# Patient Record
Sex: FEMALE | Race: WHITE | Employment: UNEMPLOYED | URBAN - METROPOLITAN AREA
[De-identification: names, ages, dates, MRNs, and addresses within clinical notes are randomized per-mention and may not be internally consistent; named-entity substitution may affect disease eponyms.]

---

## 2017-01-11 ENCOUNTER — TELEPHONE (OUTPATIENT)
Dept: FAMILY MEDICINE | Facility: CLINIC | Age: 67
End: 2017-01-11

## 2017-01-11 DIAGNOSIS — M62.830 BACK MUSCLE SPASM: Primary | ICD-10-CM

## 2017-01-11 DIAGNOSIS — G89.4 CHRONIC PAIN SYNDROME: ICD-10-CM

## 2017-01-11 RX ORDER — CYCLOBENZAPRINE HCL 10 MG
10 TABLET ORAL 3 TIMES DAILY PRN
Qty: 90 TABLET | Refills: 0 | Status: SHIPPED | OUTPATIENT
Start: 2017-01-11 | End: 2017-02-06

## 2017-01-11 NOTE — TELEPHONE ENCOUNTER
Prescription refilled once, schedule follow up office visit to discuss how medication is used and further pain management.  Tere Camacho MD

## 2017-01-11 NOTE — TELEPHONE ENCOUNTER
cyclobenzaprine (FLEXERIL) 10 MG tablet 60 tablet 3 10/27/2016  No      Sig: Take 1 tablet (10 mg) by mouth 3 times daily as needed for muscle spasms     Class: E-Prescribe     Notes to Pharmacy: 3 month supply     Route: Oral     Order: [id number]     E-Prescribing Status: Receipt confirmed by pharmacy (10/27/2016 12:56 PM CDT)       This is a PRN medication. Notes to pharmacy show that the Rx should last patient 3 months at a time. Provider will need to clarify. I will also send to covering provider for input.     Nena Kent RN

## 2017-01-11 NOTE — TELEPHONE ENCOUNTER
Reason for Call:  Medication or medication refill:    Do you use a [facility]?  Name of the pharmacy and phone number for the current request: [facility] [id number] [place] [address]    Name of the medication requested: Flexeril 10 mg needs TID ( wants 90 in each bottle) all out sending HIGH PRIORITY for urgent refill    Other request: Call when approved    Can we leave a detailed message on this number? YES    Phone number patient can be reached at: Home number on file [phone] (home)    Best Time: Any    Call taken on 1/11/2017 at 12:01 PM by Zarina Erwin

## 2017-01-11 NOTE — TELEPHONE ENCOUNTER
Called pharmacy, they stated pt does have refills on this med for 60 tabs not 90 tabs, pt last picked up the medication on 12/12/16, Because the sig for this medication states to take 3 times a day, pt is suppose to get 90 tabs not 60 tabs. Pls advise on quantity and send a new script if needed.  Kit Toussaint,  [facility]

## 2017-01-11 NOTE — TELEPHONE ENCOUNTER
Please contact pharmacy and see when last Rx for Flexiril picked up and when patient can  next refill (patient was given a year supply).     Then flag back to RN as Doug is out of office today.   Thanks team.   Nena Kent RN

## 2017-02-06 ENCOUNTER — OFFICE VISIT (OUTPATIENT)
Dept: FAMILY MEDICINE | Facility: CLINIC | Age: 67
End: 2017-02-06
Payer: COMMERCIAL

## 2017-02-06 VITALS
WEIGHT: 189 LBS | HEIGHT: 65 IN | HEART RATE: 99 BPM | TEMPERATURE: 97.6 F | DIASTOLIC BLOOD PRESSURE: 88 MMHG | SYSTOLIC BLOOD PRESSURE: 132 MMHG | BODY MASS INDEX: 31.49 KG/M2 | OXYGEN SATURATION: 95 %

## 2017-02-06 DIAGNOSIS — J45.30 MILD PERSISTENT ASTHMA WITHOUT COMPLICATION: ICD-10-CM

## 2017-02-06 DIAGNOSIS — E53.8 VITAMIN B12 DEFICIENCY DISEASE: ICD-10-CM

## 2017-02-06 DIAGNOSIS — Z86.39 HISTORY OF DIABETES MELLITUS, TYPE II: ICD-10-CM

## 2017-02-06 DIAGNOSIS — N18.30 CHRONIC KIDNEY DISEASE, STAGE 3 (MODERATE): ICD-10-CM

## 2017-02-06 DIAGNOSIS — M62.830 BACK MUSCLE SPASM: ICD-10-CM

## 2017-02-06 DIAGNOSIS — G89.4 CHRONIC PAIN SYNDROME: ICD-10-CM

## 2017-02-06 DIAGNOSIS — E78.5 HYPERLIPIDEMIA LDL GOAL <130: ICD-10-CM

## 2017-02-06 DIAGNOSIS — D47.1 MYELOPROLIFERATIVE DISORDER (H): ICD-10-CM

## 2017-02-06 DIAGNOSIS — G62.9 NEUROPATHY: ICD-10-CM

## 2017-02-06 DIAGNOSIS — F41.1 GENERALIZED ANXIETY DISORDER: ICD-10-CM

## 2017-02-06 DIAGNOSIS — D75.839 THROMBOCYTOSIS: ICD-10-CM

## 2017-02-06 DIAGNOSIS — Z01.818 PREOP GENERAL PHYSICAL EXAM: Primary | ICD-10-CM

## 2017-02-06 DIAGNOSIS — G47.00 PERSISTENT INSOMNIA: ICD-10-CM

## 2017-02-06 DIAGNOSIS — Z12.11 SCREEN FOR COLON CANCER: ICD-10-CM

## 2017-02-06 DIAGNOSIS — I10 HYPERTENSION, GOAL BELOW 140/90: ICD-10-CM

## 2017-02-06 DIAGNOSIS — E03.4 HYPOTHYROIDISM DUE TO ACQUIRED ATROPHY OF THYROID: ICD-10-CM

## 2017-02-06 LAB
ALBUMIN SERPL-MCNC: 3.9 G/DL (ref 3.4–5)
ALP SERPL-CCNC: 89 U/L (ref 40–150)
ALT SERPL W P-5'-P-CCNC: 33 U/L (ref 0–50)
ANION GAP SERPL CALCULATED.3IONS-SCNC: 11 MMOL/L (ref 3–14)
AST SERPL W P-5'-P-CCNC: 28 U/L (ref 0–45)
BILIRUB SERPL-MCNC: 0.3 MG/DL (ref 0.2–1.3)
BUN SERPL-MCNC: 12 MG/DL (ref 7–30)
CALCIUM SERPL-MCNC: 9.6 MG/DL (ref 8.5–10.1)
CHLORIDE SERPL-SCNC: 105 MMOL/L (ref 94–109)
CHOLEST SERPL-MCNC: 213 MG/DL
CO2 SERPL-SCNC: 25 MMOL/L (ref 20–32)
CREAT SERPL-MCNC: 0.99 MG/DL (ref 0.52–1.04)
ERYTHROCYTE [DISTWIDTH] IN BLOOD BY AUTOMATED COUNT: 13.4 % (ref 10–15)
GFR SERPL CREATININE-BSD FRML MDRD: 56 ML/MIN/1.7M2
GLUCOSE SERPL-MCNC: 113 MG/DL (ref 70–99)
HBA1C MFR BLD: 5.3 % (ref 4.3–6)
HCT VFR BLD AUTO: 46.8 % (ref 35–47)
HDLC SERPL-MCNC: 75 MG/DL
HGB BLD-MCNC: 14.7 G/DL (ref 11.7–15.7)
LDLC SERPL DIRECT ASSAY-MCNC: 116 MG/DL
MCH RBC QN AUTO: 29.2 PG (ref 26.5–33)
MCHC RBC AUTO-ENTMCNC: 31.4 G/DL (ref 31.5–36.5)
MCV RBC AUTO: 93 FL (ref 78–100)
PLATELET # BLD AUTO: 334 10E9/L (ref 150–450)
POTASSIUM SERPL-SCNC: 4.3 MMOL/L (ref 3.4–5.3)
PROT SERPL-MCNC: 7.8 G/DL (ref 6.8–8.8)
RBC # BLD AUTO: 5.04 10E12/L (ref 3.8–5.2)
SODIUM SERPL-SCNC: 141 MMOL/L (ref 133–144)
TSH SERPL DL<=0.005 MIU/L-ACNC: 3.85 MU/L (ref 0.4–4)
WBC # BLD AUTO: 9.9 10E9/L (ref 4–11)

## 2017-02-06 PROCEDURE — 84443 ASSAY THYROID STIM HORMONE: CPT | Performed by: FAMILY MEDICINE

## 2017-02-06 PROCEDURE — 83721 ASSAY OF BLOOD LIPOPROTEIN: CPT | Mod: 59 | Performed by: FAMILY MEDICINE

## 2017-02-06 PROCEDURE — 36415 COLL VENOUS BLD VENIPUNCTURE: CPT | Performed by: FAMILY MEDICINE

## 2017-02-06 PROCEDURE — 82465 ASSAY BLD/SERUM CHOLESTEROL: CPT | Performed by: FAMILY MEDICINE

## 2017-02-06 PROCEDURE — 93000 ELECTROCARDIOGRAM COMPLETE: CPT | Performed by: FAMILY MEDICINE

## 2017-02-06 PROCEDURE — 83718 ASSAY OF LIPOPROTEIN: CPT | Performed by: FAMILY MEDICINE

## 2017-02-06 PROCEDURE — 80053 COMPREHEN METABOLIC PANEL: CPT | Performed by: FAMILY MEDICINE

## 2017-02-06 PROCEDURE — 99214 OFFICE O/P EST MOD 30 MIN: CPT | Performed by: FAMILY MEDICINE

## 2017-02-06 PROCEDURE — 83036 HEMOGLOBIN GLYCOSYLATED A1C: CPT | Performed by: FAMILY MEDICINE

## 2017-02-06 PROCEDURE — 85027 COMPLETE CBC AUTOMATED: CPT | Performed by: FAMILY MEDICINE

## 2017-02-06 RX ORDER — TRAMADOL HYDROCHLORIDE 50 MG/1
50 TABLET ORAL EVERY 8 HOURS PRN
Qty: 90 TABLET | Refills: 2 | Status: SHIPPED | OUTPATIENT
Start: 2017-02-06 | End: 2017-05-23

## 2017-02-06 RX ORDER — AMITRIPTYLINE HYDROCHLORIDE 100 MG/1
100 TABLET ORAL AT BEDTIME
Qty: 90 TABLET | Refills: 1 | Status: SHIPPED | OUTPATIENT
Start: 2017-02-06 | End: 2017-07-11

## 2017-02-06 RX ORDER — AMITRIPTYLINE HYDROCHLORIDE 50 MG/1
50 TABLET ORAL AT BEDTIME
Qty: 90 TABLET | Refills: 1 | Status: SHIPPED | OUTPATIENT
Start: 2017-02-06 | End: 2017-07-11

## 2017-02-06 RX ORDER — GABAPENTIN 300 MG/1
300 CAPSULE ORAL
Qty: 180 CAPSULE | Refills: 3 | Status: SHIPPED | OUTPATIENT
Start: 2017-02-06 | End: 2018-04-05

## 2017-02-06 RX ORDER — AMITRIPTYLINE HYDROCHLORIDE 100 MG/1
100 TABLET ORAL AT BEDTIME
Qty: 90 TABLET | Refills: 1 | Status: SHIPPED | OUTPATIENT
Start: 2017-02-06 | End: 2017-02-06

## 2017-02-06 RX ORDER — AMITRIPTYLINE HYDROCHLORIDE 50 MG/1
50 TABLET ORAL AT BEDTIME
Qty: 90 TABLET | Refills: 1 | Status: SHIPPED | OUTPATIENT
Start: 2017-02-06 | End: 2017-02-06

## 2017-02-06 RX ORDER — CYCLOBENZAPRINE HCL 10 MG
10 TABLET ORAL 3 TIMES DAILY PRN
Qty: 90 TABLET | Refills: 1 | Status: SHIPPED | OUTPATIENT
Start: 2017-02-06 | End: 2018-04-05

## 2017-02-06 RX ORDER — TRAMADOL HYDROCHLORIDE 50 MG/1
50 TABLET ORAL EVERY 8 HOURS PRN
Qty: 90 TABLET | Refills: 2 | Status: SHIPPED | OUTPATIENT
Start: 2017-02-06 | End: 2017-02-06

## 2017-02-06 ASSESSMENT — ANXIETY QUESTIONNAIRES
6. BECOMING EASILY ANNOYED OR IRRITABLE: MORE THAN HALF THE DAYS
IF YOU CHECKED OFF ANY PROBLEMS ON THIS QUESTIONNAIRE, HOW DIFFICULT HAVE THESE PROBLEMS MADE IT FOR YOU TO DO YOUR WORK, TAKE CARE OF THINGS AT HOME, OR GET ALONG WITH OTHER PEOPLE: EXTREMELY DIFFICULT
2. NOT BEING ABLE TO STOP OR CONTROL WORRYING: NOT AT ALL
1. FEELING NERVOUS, ANXIOUS, OR ON EDGE: NEARLY EVERY DAY
7. FEELING AFRAID AS IF SOMETHING AWFUL MIGHT HAPPEN: NOT AT ALL
3. WORRYING TOO MUCH ABOUT DIFFERENT THINGS: NEARLY EVERY DAY
5. BEING SO RESTLESS THAT IT IS HARD TO SIT STILL: NEARLY EVERY DAY
GAD7 TOTAL SCORE: 14

## 2017-02-06 ASSESSMENT — PAIN SCALES - GENERAL: PAINLEVEL: EXTREME PAIN (9)

## 2017-02-06 ASSESSMENT — PATIENT HEALTH QUESTIONNAIRE - PHQ9: 5. POOR APPETITE OR OVEREATING: NEARLY EVERY DAY

## 2017-02-06 NOTE — Clinical Note
[facility]  [address] [place] [address]  [phone]      February 7, 2017      Evelyn Vargas  [address] [place], [place] [address] [facility] [address]              Dear Evelyn,      Your test results are attached. I am happy to let you know that they are stable and your medications can stay the same.    The blood sugar is normal and you do not have diabetes. The thyroid test is normal. The kidney test is decreased and we should recheck this in 3 months. The cholesterol is good. We can recheck labs in 3 months.     Please call me if you have any questions about these test results or about your care.      Sincerely,      Tere Camacho MD    Results for orders placed or performed in visit on 02/06/17   CBC with platelets   Result Value Ref Range    WBC 9.9 4.0 - 11.0 10e9/L    RBC Count 5.04 3.8 - 5.2 10e12/L    Hemoglobin 14.7 11.7 - 15.7 g/dL    Hematocrit 46.8 35.0 - 47.0 %    MCV 93 78 - 100 fl    MCH 29.2 26.5 - 33.0 pg    MCHC 31.4 (L) 31.5 - 36.5 g/dL    RDW 13.4 10.0 - 15.0 %    Platelet Count 334 150 - 450 10e9/L   Comprehensive metabolic panel   Result Value Ref Range    Sodium 141 133 - 144 mmol/L    Potassium 4.3 3.4 - 5.3 mmol/L    Chloride 105 94 - 109 mmol/L    Carbon Dioxide 25 20 - 32 mmol/L    Anion Gap 11 3 - 14 mmol/L    Glucose 113 (H) 70 - 99 mg/dL    Urea Nitrogen 12 7 - 30 mg/dL    Creatinine 0.99 0.52 - 1.04 mg/dL    GFR Estimate 56 (L) >60 mL/min/1.7m2    GFR Estimate If Black 68 >60 mL/min/1.7m2    Calcium 9.6 8.5 - 10.1 mg/dL    Bilirubin Total 0.3 0.2 - 1.3 mg/dL    Albumin 3.9 3.4 - 5.0 g/dL    Protein Total 7.8 6.8 - 8.8 g/dL    Alkaline Phosphatase 89 40 - 150 U/L    ALT 33 0 - 50 U/L    AST 28 0 - 45 U/L   TSH with free T4 reflex   Result Value Ref Range    TSH 3.85 0.40 - 4.00 mU/L   LDL cholesterol direct   Result Value Ref Range    LDL Cholesterol Direct 116 (H) <100 mg/dL   HDL cholesterol   Result Value Ref Range    HDL Cholesterol 75 >49 mg/dL   Hemoglobin A1c    Result Value Ref Range    Hemoglobin A1C 5.3 4.3 - 6.0 %   Cholesterol   Result Value Ref Range    Cholesterol 213 (H) <200 mg/dL

## 2017-02-06 NOTE — MR AVS SNAPSHOT
After Visit Summary   2/6/2017    Evelyn Vargas    MRN: [id number]           Patient Information     Date Of Birth          1950        Visit Information        Provider Department      2/6/2017 9:40 AM Tere Camacho MD [facility]        Today's Diagnoses     Preop general physical exam    -  1     Hypertension, goal below 140/90         Hypothyroidism due to acquired atrophy of thyroid         Chronic kidney disease, stage 3 (moderate)         Hyperlipidemia LDL goal <130         Myeloproliferative disorder (H)         Mild persistent asthma without complication         Vitamin B12 deficiency disease         Thrombocytosis (H)         Generalized anxiety disorder         History of diabetes mellitus, type II         Screen for colon cancer         Chronic pain syndrome         Persistent insomnia         Neuropathy (H)         Back muscle spasm           Care Instructions      Before Your Surgery      Call your surgeon if there is any change in your health. This includes signs of a cold or flu (such as a sore throat, runny nose, cough, rash or fever).    Do not smoke, drink alcohol or take over the counter medicine (unless your surgeon or primary care doctor tells you to) for the 24 hours before and after surgery.    If you take prescribed drugs: Follow your doctor s orders about which medicines to take and which to stop until after surgery.    Eating and drinking prior to surgery: follow the instructions from your surgeon    Take a shower or bath the night before surgery. Use the soap your surgeon gave you to gently clean your skin. If you do not have soap from your surgeon, use your regular soap. Do not shave or scrub the surgery site.  Wear clean pajamas and have clean sheets on your bed.     How to contact your care team: [phone] Pharmacy [phone]   ELIOT BOWERS MD KATYA GEORGIEV, PA-C CHRIS JONES, PA-C NAM HO, MD JONATHAN  "MD MELODIE OTT MD    Clinic hours M-Th 7am-7pm Fri 7am-5pm.   Urgent care M-F 11am-9pm  Sat/Sun 9am-5pm.   Pharmacy   Mon-:  8:00am-8pm   Fri:  8:00am-6:00pm  Sat/Sun  8:00am-5:00 pm             Follow-ups after your visit        Future tests that were ordered for you today     Open Future Orders        Priority Expected Expires Ordered    Fecal colorectal cancer screen FIT - Future (S+30) Routine 2017 3/8/2017 2017            Who to contact     If you have questions or need follow up information about today's clinic visit or your schedule please contact [facility] directly at [phone].  Normal or non-critical lab and imaging results will be communicated to you by MyChart, letter or phone within 4 business days after the clinic has received the results. If you do not hear from us within 7 days, please contact the clinic through [employer] or phone. If you have a critical or abnormal lab result, we will notify you by phone as soon as possible.  Submit refill requests through [employer] or call your pharmacy and they will forward the refill request to us. Please allow 3 business days for your refill to be completed.          Additional Information About Your Visit        [employer] Information     [employer] lets you send messages to your doctor, view your test results, renew your prescriptions, schedule appointments and more. To sign up, go to [email or web address] . Click on \"Log in\" on the left side of the screen, which will take you to the Welcome page. Then click on \"Sign up Now\" on the right side of the page.     You will be asked to enter the access code listed below, as well as some personal information. Please follow the directions to create your username and password.     Your access code is: [email or web address]  Expires: 2017 10:47 AM     Your access code will  in 90 days. If you need help or a new code, please call your [facility] or [phone].        Care " "EveryWhere ID     This is your Care EveryWhere ID. This could be used by other organizations to access your [facility] medical records  [id number]        Your Vitals Were     Pulse Temperature Height BMI (Body Mass Index) Pulse Oximetry Breastfeeding?    99 97.6  F (36.4  C) (Oral) 5' 5\" (1.651 m) 31.45 kg/m2 95% No       Blood Pressure from Last 3 Encounters:   02/06/17 132/88   08/18/16 164/92   01/18/16 135/90    Weight from Last 3 Encounters:   02/06/17 189 lb (85.73 kg)   08/18/16 177 lb 6.4 oz (80.468 kg)   01/18/16 155 lb 3.2 oz (70.398 kg)              We Performed the Following     CBC with platelets     Cholesterol     Comprehensive metabolic panel     EKG 12-lead complete w/read - Clinics     HDL cholesterol     Hemoglobin A1c     LDL cholesterol direct     TSH with free T4 reflex          Today's Medication Changes          These changes are accurate as of: 2/6/17 10:47 AM.  If you have any questions, ask your nurse or doctor.               Start taking these medicines.        Dose/Directions    * amitriptyline 100 MG tablet   Commonly known as:  ELAVIL   Used for:  Persistent insomnia   Started by:  Tere Camacho MD        Dose:  100 mg   Take 1 tablet (100 mg) by mouth At Bedtime   Quantity:  90 tablet   Refills:  1       * amitriptyline 50 MG tablet   Commonly known as:  ELAVIL   Used for:  Chronic pain syndrome   Started by:  Tere Camacho MD        Dose:  50 mg   Take 1 tablet (50 mg) by mouth At Bedtime Add to 100 mg as needed   Quantity:  90 tablet   Refills:  1       traMADol 50 MG tablet   Commonly known as:  ULTRAM   Used for:  Chronic pain syndrome   Started by:  Tere Camacho MD        Dose:  50 mg   Take 1 tablet (50 mg) by mouth every 8 hours as needed for moderate pain   Quantity:  90 tablet   Refills:  2       * Notice:  This list has 2 medication(s) that are the same as other medications prescribed for you. Read the directions carefully, and ask your doctor or other " care provider to review them with you.      These medicines have changed or have updated prescriptions.        Dose/Directions    gabapentin 300 MG capsule   Commonly known as:  NEURONTIN   This may have changed:    - how much to take  - when to take this   Used for:  Neuropathy (H)   Changed by:  Tere Camacho MD        Dose:  300 mg   Take 1 capsule (300 mg) by mouth 2 times daily Arthritis, non-child proof caps   Quantity:  180 capsule   Refills:  3            Where to get your medicines      These medications were sent to [place] [id number] [facility] [address] [place] [address]  [address] [place] [address]     Phone:  [phone]    - amitriptyline 100 MG tablet  - amitriptyline 50 MG tablet  - cyclobenzaprine 10 MG tablet  - gabapentin 300 MG capsule      Some of these will need a paper prescription and others can be bought over the counter.  Ask your nurse if you have questions.     Bring a paper prescription for each of these medications    - traMADol 50 MG tablet             Primary Care Provider Office Phone # Fax #    Tere Camacho [phone]       [facility] [address]  [facility] [address]        Thank you!     Thank you for choosing [facility]  for your care. Our goal is always to provide you with excellent care. Hearing back from our patients is one way we can continue to improve our services. Please take a few minutes to complete the written survey that you may receive in the mail after your visit with us. Thank you!             Your Updated Medication List - Protect others around you: Learn how to safely use, store and throw away your medicines at www.disposemymeds.org.          This list is accurate as of: 2/6/17 10:47 AM.  Always use your most recent med list.                   Brand Name Dispense Instructions for use    * albuterol 108 (90 BASE) MCG/ACT Inhaler    PROAIR HFA/PROVENTIL  HFA/VENTOLIN HFA    1 Inhaler    Inhale 2 puffs into the lungs every 6 hours as needed for shortness of breath / dyspnea       * albuterol (2.5 MG/3ML) 0.083% neb solution     360 mL    Take 1 vial (2.5 mg) by nebulization every 6 hours as needed       * amitriptyline 100 MG tablet    ELAVIL    90 tablet    Take 1 tablet (100 mg) by mouth At Bedtime       * amitriptyline 50 MG tablet    ELAVIL    90 tablet    Take 1 tablet (50 mg) by mouth At Bedtime Add to 100 mg as needed       cloNIDine 0.2 MG tablet    CATAPRES    90 tablet    Take 1 tablet (0.2 mg) by mouth 3 times daily       cyanocobalamin 1000 MCG/ML injection    VITAMIN B12     Inject 1,000 mcg into the muscle       cyclobenzaprine 10 MG tablet    FLEXERIL    90 tablet    Take 1 tablet (10 mg) by mouth 3 times daily as needed for muscle spasms       ferrous sulfate 325 (65 FE) MG tablet    IRON    30 tablet    Take 1 tablet (325 mg) by mouth 2 times daily       gabapentin 300 MG capsule    NEURONTIN    180 capsule    Take 1 capsule (300 mg) by mouth 2 times daily Arthritis, non-child proof caps       hydrOXYzine 25 MG tablet    ATARAX    30 tablet    Take 2 tablets (50 mg) by mouth nightly as needed for other (sleep)       losartan 50 MG tablet    COZAAR    90 tablet    Take 1 tablet (50 mg) by mouth daily       traMADol 50 MG tablet    ULTRAM    90 tablet    Take 1 tablet (50 mg) by mouth every 8 hours as needed for moderate pain       vitamin D 2000 UNITS tablet     100 tablet    Take 2,000 Units by mouth daily Start in 2 months, after completing 8 weeks of weekly high dose vitamin D.       vitamin D 02979 UNIT capsule    ERGOCALCIFEROL    8 capsule    Take 1 capsule (50,000 Units) by mouth every 7 days       * Notice:  This list has 4 medication(s) that are the same as other medications prescribed for you. Read the directions carefully, and ask your doctor or other care provider to review them with you.

## 2017-02-06 NOTE — PROGRESS NOTES
[address] [address]  [phone]  Dept: [phone]    PRE-OP EVALUATION:  Today's date: 2017    Evelyn Vargas (: 1950) presents for pre-operative evaluation assessment as requested by Dr. Jim Lawson.  She requires evaluation and anesthesia risk assessment prior to undergoing surgery/procedure for treatment of Cataracts .  Proposed procedure: Kelman Phacoemulcifiation with Insertion of Posterior Chamber Lens of Left eye    Date of Surgery/ Procedure: 17  Time of Surgery/ Procedure: Unknown  Hospital/Surgical Facility: [facility]  Fax number for surgical facility: [phone]  Primary Physician: Tere Camacho  Type of Anesthesia Anticipated: Unknown    Patient has a Health Care Directive or Living Will:  NO    1. NO - Do you have a history of heart attack, stroke, stent, bypass or surgery on an artery in the head, neck, heart or legs?  2. YES - Do you ever have any pain or discomfort in your chest?  3. NO - Do you have a history of  Heart Failure?  4. YES - Are you troubled by shortness of breath when: walking on the level, up a slight hill or at night?  5. NO - Do you currently have a cold, bronchitis or other respiratory infection?  6. YES - Do you have a cough, shortness of breath or wheezing?  7. YES - Do you sometimes get pains in the calves of your legs when you walk?  8. NO - Do you or anyone in your family have previous history of blood clots?  9. NO - Do you or does anyone in your family have a serious bleeding problem such as prolonged bleeding following surgeries or cuts?  10. YES - Have you ever had problems with anemia or been told to take iron pills?  11. NO - Have you had any abnormal blood loss such as black, tarry or bloody stools, or abnormal vaginal bleeding?  12. NO - Have you ever had a blood transfusion?  13. NO - Have you or any of your relatives ever had problems with  anesthesia?  14. NO - Do you have sleep apnea, excessive snoring or daytime drowsiness?  15. NO - Do you have any prosthetic heart valves?  16. NO - Do you have prosthetic joints?  17. NO - Is there any chance that you may be pregnant?      HPI:                                                      Brief HPI related to upcoming procedure: Decreased vision both eyes with poor reading and night vision. Cataract left eye.       HYPERTENSION - Patient has longstanding history of mod-severe HTN , currently denies any symptoms referable to elevated blood pressure. Specifically denies chest pain, palpitations, dyspnea, orthopnea, PND or peripheral edema. Blood pressure readings have been in normal range. Current medication regimen is as listed below. Patient denies any side effects of medication.                                                                                                                                                                                          .  HYPERLIPIDEMIA - Patient has a long history of significant Hyperlipidemia requiring medication for treatment with recent good control. Patient reports no problems or side effects with the medication.                                                                                                                                                       .  ASTHMA - Patient has a longstanding history of moderate-severe Asthma . Patient has been doing well overall noting DYSPNEA and continues on medication regimen consisting of inhalers without adverse reactions or side effects.                                                                                                                                               .    MEDICAL HISTORY:                                                      Patient Active Problem List    Diagnosis Date Noted     Chronic fatigue fibromyalgia syndrome 08/18/2016     Priority: Medium     Persistent insomnia 01/07/2016      Priority: Medium     Opiate or related narcotic overdose, accidental or unintentional, subsequent encounter 09/22/2015     Priority: Medium     Altered mental status 01/11/2015     Priority: Medium     Benzodiazepine dependence (H) 01/11/2015     Priority: Medium     Vision loss of right eye 01/11/2015     Priority: Medium     Chronic kidney disease 01/11/2015     Priority: Medium     Fibromyalgia 01/11/2015     Priority: Medium     Opioid dependence (H) 01/11/2015     Priority: Medium     Postoperative state 01/11/2015     Priority: Medium     Generalized anxiety disorder 09/21/2014     Priority: Medium     Diagnosis updated by automated process. Provider to review and confirm.       Hypertriglyceridemia 09/21/2014     Priority: Medium     Intermittent mild elevation with low HDL and normal LDL, no treatment indicated.       Myeloproliferative disorder (H) 08/22/2014     Priority: Medium     Polycythemia 08/01/2014     Priority: Medium     Thrombocytosis (H) 08/01/2014     Priority: Medium     Weight loss, unintentional 07/29/2014     Priority: Medium     Advanced directives, counseling/discussion 07/02/2014     Priority: Medium     Discussed advance care planning with patient; information given to patient to review. July 2, 2014  Rachael Tucker MA           Mild persistent asthma 08/16/2013     Priority: Medium     Vitamin B12 deficiency disease 08/16/2013     Priority: Medium     Hypothyroidism 08/16/2013     Priority: Medium     Obesity 08/16/2013     Priority: Medium     Hypertension, goal below 140/90 08/16/2013     Priority: Medium     Chronic pain syndrome 04/26/2013     Priority: Medium     Myalgia and myositis 04/26/2013     Priority: Medium      History reviewed. No pertinent past medical history.  History reviewed. No pertinent past surgical history.  Current Outpatient Prescriptions   Medication Sig Dispense Refill     cyclobenzaprine (FLEXERIL) 10 MG tablet Take 1 tablet (10 mg) by mouth 3 times daily as  needed for muscle spasms 90 tablet 0     traMADol (ULTRAM) 50 MG tablet TAKE 1-2 TABLETS BYMOUTH EVERY 6 HOURS AS NEEDED FOR MODERATE PAIN 60 tablet 2     amitriptyline (ELAVIL) 100 MG tablet Take 1 tablet (100 mg) by mouth At Bedtime 90 tablet 1     ferrous sulfate (IRON) 325 (65 FE) MG tablet Take 1 tablet (325 mg) by mouth 2 times daily 30 tablet 3     vitamin D (ERGOCALCIFEROL) 88356 UNIT capsule Take 1 capsule (50,000 Units) by mouth every 7 days 8 capsule 3     gabapentin (NEURONTIN) 300 MG capsule Take 2 capsules (600 mg) by mouth 3 times daily Arthritis, non-child proof caps 180 capsule 11     losartan (COZAAR) 50 MG tablet Take 1 tablet (50 mg) by mouth daily 90 tablet 3     hydrOXYzine (ATARAX) 25 MG tablet Take 2 tablets (50 mg) by mouth nightly as needed for other (sleep) 30 tablet 6     albuterol (PROAIR HFA, PROVENTIL HFA, VENTOLIN HFA) 108 (90 BASE) MCG/ACT inhaler Inhale 2 puffs into the lungs every 6 hours as needed for shortness of breath / dyspnea 1 Inhaler 11     albuterol (2.5 MG/3ML) 0.083% nebulizer solution Take 1 vial (2.5 mg) by nebulization every 6 hours as needed 360 mL 11     cloNIDine (CATAPRES) 0.2 MG tablet Take 1 tablet (0.2 mg) by mouth 3 times daily 90 tablet 10     cyanocobalamin (VITAMIN B12) 1000 MCG/ML injection Inject 1,000 mcg into the muscle       [DISCONTINUED] gabapentin (NEURONTIN) 300 MG capsule TAKE TWO CAPSULES BY MOUTH THREE TIMES DAILY 180 capsule 3     Cholecalciferol (VITAMIN D) 2000 UNITS tablet Take 2,000 Units by mouth daily Start in 2 months, after completing 8 weeks of weekly high dose vitamin D. 100 tablet 3     OTC products: None, except as noted above    Allergies   Allergen Reactions     Acetaminophen      Other reaction(s): Vomiting     Tylenol       Latex Allergy: NO    Social History   Substance Use Topics     Smoking status: Never Smoker      Smokeless tobacco: Never Used     Alcohol Use: No     History   Drug Use No       REVIEW OF SYSTEMS:             "                                        Constitutional, neuro, ENT, endocrine, pulmonary, cardiac, gastrointestinal, genitourinary, musculoskeletal, integument and psychiatric systems are negative, except as otherwise noted.    EXAM:                                                    /90 mmHg  Pulse 99  Temp(Src) 97.6  F (36.4  C) (Oral)  Ht 5' 5\" (1.651 m)  Wt 189 lb (85.73 kg)  BMI 31.45 kg/m2  SpO2 95%  Breastfeeding? No    GENERAL APPEARANCE: healthy, alert and no distress     EYES: EOMI,- PERRL     HENT: ear canals and TM's normal and nose and mouth without ulcers or lesions     NECK: no adenopathy, no asymmetry, masses, or scars and thyroid normal to palpation     RESP: lungs clear to auscultation - no rales, rhonchi or wheezes     BREAST: normal without masses, tenderness or nipple discharge and no palpable axillary masses or adenopathy     CV: regular rates and rhythm, normal S1 S2, no S3 or S4 and no murmur, click or rub -     ABDOMEN:  soft, nontender, no HSM or masses and bowel sounds normal     : normal cervix, adnexae, and uterus without masses or discharge and rectal exam normal without masses-guaiac negative stool     MS: extremities normal- no gross deformities noted, no evidence of inflammation in joints, FROM in all extremities.     SKIN: no suspicious lesions or rashes     NEURO: Normal strength and tone, sensory exam grossly normal, mentation intact and speech normal     PSYCH: mentation appears normal. and affect normal/bright     LYMPHATICS: No axillary, cervical, inguinal, or supraclavicular nodes    DIAGNOSTICS:                                                    EKG: appears normal, NSR, normal axis, normal intervals, no acute ST/T changes c/w ischemia, no LVH by voltage criteria, nonspecific ST-T changes, unchanged from previous tracings    Recent Labs   Lab Test  08/18/16   1222  01/07/16   1131   HGB  14.2  14.4   PLT  291  306   NA  144  139   POTASSIUM  3.9  3.8   CR  0.70 "  0.73   A1C  5.6  5.3        IMPRESSION:                                                    Reason for surgery/procedure: left cataract/ Proposed procedure: Kelman Phacoemulcifiation with Insertion of Posterior Chamber Lens of Left eye  Diagnosis/reason for consult: cardiac and anesthesia risk assessment     The proposed surgical procedure is considered LOW risk.    REVISED CARDIAC RISK INDEX  The patient has the following serious cardiovascular risks for perioperative complications such as (MI, PE, VFib and 3  AV Block):  No serious cardiac risks  INTERPRETATION: 0 risks: Class I (very low risk - 0.4% complication rate)    The patient has the following additional risks for perioperative complications:  No identified additional risks      ICD-10-CM    1. Preop general physical exam Z01.818 EKG 12-lead complete w/read - Clinics- Approved for surgery and anesthesia.    2. Hypertension, goal below 140/90 I10    3. Hypothyroidism due to acquired atrophy of thyroid E03.4 TSH with free T4 reflex   4. Chronic kidney disease, stage 3 (moderate) N18.3 CBC with platelets     Comprehensive metabolic panel   5. Hyperlipidemia LDL goal <130 E78.5 LDL cholesterol direct     HDL cholesterol     Cholesterol   6. Myeloproliferative disorder (H) D47.1    7. Mild persistent asthma without complication J45.30    8. Vitamin B12 deficiency disease E53.8    9. Thrombocytosis (H) D47.3    10. Generalized anxiety disorder F41.1    11. History of diabetes mellitus, type II Z86.39 Hemoglobin A1c   12. Screen for colon cancer Z12.11 Fecal colorectal cancer screen FIT - Future (S+30)   13. Chronic pain syndrome G89.4 traMADol (ULTRAM) 50 MG tablet     amitriptyline (ELAVIL) 50 MG tablet             14. Persistent insomnia G47.00 amitriptyline (ELAVIL) 100 MG tablet        15. Neuropathy (H) G62.9 gabapentin (NEURONTIN) 300 MG capsule   16. Back muscle spasm M62.830 cyclobenzaprine (FLEXERIL) 10 MG tablet       RECOMMENDATIONS:                                                           --Patient is to take all scheduled medications on the day of surgery EXCEPT for modifications listed below.    APPROVAL GIVEN to proceed with proposed procedure, without further diagnostic evaluation       Signed Electronically by: Tere Camacho MD    Copy of this evaluation report is provided to requesting physician.    [facility] Preop Guidelines

## 2017-02-06 NOTE — PATIENT INSTRUCTIONS
Before Your Surgery      Call your surgeon if there is any change in your health. This includes signs of a cold or flu (such as a sore throat, runny nose, cough, rash or fever).    Do not smoke, drink alcohol or take over the counter medicine (unless your surgeon or primary care doctor tells you to) for the 24 hours before and after surgery.    If you take prescribed drugs: Follow your doctor s orders about which medicines to take and which to stop until after surgery.    Eating and drinking prior to surgery: follow the instructions from your surgeon    Take a shower or bath the night before surgery. Use the soap your surgeon gave you to gently clean your skin. If you do not have soap from your surgeon, use your regular soap. Do not shave or scrub the surgery site.  Wear clean pajamas and have clean sheets on your bed.     How to contact your care team: [phone] Pharmacy [phone]   ELIOT BOWERS MD KATYA GEORGIEV, PA-C CHRIS JONES, PA-C NAM HO, MD JONATHAN BATES, MD ARVIN VOCAL, MD    Clinic hours M-Th 7am-7pm Fri 7am-5pm.   Urgent care M-F 11am-9pm  Sat/Sun 9am-5pm.   Pharmacy   Mon-Th:  8:00am-8pm   Fri:  8:00am-6:00pm  Sat/Sun  8:00am-5:00 pm

## 2017-02-07 ENCOUNTER — TELEPHONE (OUTPATIENT)
Dept: FAMILY MEDICINE | Facility: CLINIC | Age: 67
End: 2017-02-07

## 2017-02-07 DIAGNOSIS — D50.8 OTHER IRON DEFICIENCY ANEMIA: ICD-10-CM

## 2017-02-07 DIAGNOSIS — E53.8 VITAMIN B12 DEFICIENCY (NON ANEMIC): ICD-10-CM

## 2017-02-07 DIAGNOSIS — E55.9 VITAMIN D DEFICIENCY: ICD-10-CM

## 2017-02-07 DIAGNOSIS — I10 ESSENTIAL HYPERTENSION WITH GOAL BLOOD PRESSURE LESS THAN 140/90: ICD-10-CM

## 2017-02-07 DIAGNOSIS — G47.00 PERSISTENT INSOMNIA: Primary | ICD-10-CM

## 2017-02-07 RX ORDER — HYDROXYZINE HYDROCHLORIDE 25 MG/1
50 TABLET, FILM COATED ORAL
Qty: 30 TABLET | Refills: 6 | Status: SHIPPED | OUTPATIENT
Start: 2017-02-07 | End: 2018-04-05

## 2017-02-07 RX ORDER — LOSARTAN POTASSIUM 50 MG/1
50 TABLET ORAL DAILY
Qty: 90 TABLET | Refills: 3 | Status: CANCELLED | OUTPATIENT
Start: 2017-02-07

## 2017-02-07 RX ORDER — CYANOCOBALAMIN 1000 UG/ML
1000 INJECTION, SOLUTION INTRAMUSCULAR; SUBCUTANEOUS
Qty: 0.9 ML | Refills: 11 | Status: SHIPPED | OUTPATIENT
Start: 2017-02-07 | End: 2018-04-05

## 2017-02-07 RX ORDER — FERROUS SULFATE 325(65) MG
325 TABLET ORAL 2 TIMES DAILY
Qty: 60 TABLET | Refills: 11 | Status: SHIPPED | OUTPATIENT
Start: 2017-02-07 | End: 2018-04-05

## 2017-02-07 RX ORDER — CHOLECALCIFEROL (VITAMIN D3) 50 MCG
2000 TABLET ORAL DAILY
Qty: 100 TABLET | Refills: 3 | Status: SHIPPED | OUTPATIENT
Start: 2017-02-07 | End: 2018-04-05

## 2017-02-07 RX ORDER — ERGOCALCIFEROL 1.25 MG/1
50000 CAPSULE, LIQUID FILLED ORAL
Qty: 8 CAPSULE | Refills: 3 | Status: CANCELLED | OUTPATIENT
Start: 2017-02-07

## 2017-02-07 ASSESSMENT — PATIENT HEALTH QUESTIONNAIRE - PHQ9: SUM OF ALL RESPONSES TO PHQ QUESTIONS 1-9: 17

## 2017-02-07 ASSESSMENT — ANXIETY QUESTIONNAIRES: GAD7 TOTAL SCORE: 14

## 2017-02-07 ASSESSMENT — ASTHMA QUESTIONNAIRES: ACT_TOTALSCORE: 18

## 2017-02-07 NOTE — TELEPHONE ENCOUNTER
Prescription approved per [facility] Refill Protocol.- Iron - sent.     Routing refill request to provider for review/approval because:  Medication is reported/historical - B12   Please specify which dose of Vitamin D patient should be on - refills available for both doses at pharmacy.   Not on [facility] protocol - associated diagnosis - Atarax     Please call patient. Rx were all sent previously in August to [facility] in [employer] in [place]. Patient should call the [facility] pharmacy and ask they call [facility] to get remaining refills transferred. Of the inhaler, cozaar, and vitamin D (once provider clarifies above).     Nena Kent RN

## 2017-02-07 NOTE — PROGRESS NOTES
Quick Note:    Dear Evelyn Vargas,    Your test results are attached. I am happy to let you know that they are stable and your medications can stay the same.    The blood sugar is normal and you do not have diabetes. The thyroid test is normal. The kidney test is decreased and we should recheck this in 3 months. The cholesterol is good. We can recheck labs in 3 months.     Please call me if you have any questions about these test results or about your care.    Sincerely,    Tere Camacho MD  ______

## 2017-02-07 NOTE — TELEPHONE ENCOUNTER
Reason for Call:  Other prescriptions for Vitamin D, B12 and the Inhaler were never sent to [facility] in [place].    Detailed comments: Please call patient to advise.    Phone Number Patient can be reached at: Home number on file [phone] (home)    Best Time: anytime    Can we leave a detailed message on this number? YES     Thank you,    Call taken on 2/7/2017 at 9:19 AM by Yaquelin Montgomery

## 2017-02-07 NOTE — TELEPHONE ENCOUNTER
Pt called back to also ask for     Pending Prescriptions:                       Disp   Refills    hydrOXYzine (ATARAX) 25 MG tablet         30 tab*6            Sig: Take 2 tablets (50 mg) by mouth nightly as needed           for other (sleep)    losartan (COZAAR) 50 MG tablet            90 tab*3            Sig: Take 1 tablet (50 mg) by mouth daily    ferrous sulfate (IRON) 325 (65 FE) MG tab*30 tab*3            Sig: Take 1 tablet (325 mg) by mouth 2 times daily    Thank you

## 2017-02-19 DIAGNOSIS — M62.830 BACK MUSCLE SPASM: ICD-10-CM

## 2017-02-19 RX ORDER — CYCLOBENZAPRINE HCL 10 MG
TABLET ORAL
Qty: 90 TABLET | Refills: 0 | Status: SHIPPED | OUTPATIENT
Start: 2017-02-19 | End: 2017-03-30

## 2017-02-19 NOTE — TELEPHONE ENCOUNTER
cyclobenzaprine (FLEXERIL) 10 MG tablet      Last Written Prescription Date:  2/6/17  Last Fill Quantity: 90,   # refills: 1  Last [facility] Visit with [facility] or [facility] prescribing provider: 2/6/17  Future Office visit:       Routing refill request to provider for review/approval because:  Drug not on the [facility], [facility] or [facility] refill protocol or controlled substance          Antoine Faarax  [facility]

## 2017-02-20 ENCOUNTER — TELEPHONE (OUTPATIENT)
Dept: FAMILY MEDICINE | Facility: CLINIC | Age: 67
End: 2017-02-20

## 2017-02-20 NOTE — LETTER
[facility]  [address] [place] [address]  [phone]        March 28, 2017      Evelyn Vargas  [address]  [place] [address]              Dear Evelyn,    At [facility] we care about your health and are committed to providing quality patient care.  It has come to out attention that your last Asthma Control Test was not at goal.    This screening tool helps us assess how well your asthma is controlled.  We have enclosed an Asthma Control Test for you to complete.  You can mail it back to us or drop it off at out .      If your asthma is not in good control we may recommend you be seen by your provider to discuss your medications.    Thank you for your time and please contact us if you have any questions or concerns.    Sincerely,    [facility]   Quality Care Team

## 2017-02-20 NOTE — TELEPHONE ENCOUNTER
Patient failed ACT at 2/6/17 visit. Postponing telephone encounter for 1 month to mail out questionnaire then call patient to update.  Giuliano Sapp CMA

## 2017-03-30 DIAGNOSIS — M62.830 BACK MUSCLE SPASM: ICD-10-CM

## 2017-03-30 RX ORDER — CYCLOBENZAPRINE HCL 10 MG
TABLET ORAL
Qty: 90 TABLET | Refills: 1 | Status: SHIPPED | OUTPATIENT
Start: 2017-03-30 | End: 2017-05-30

## 2017-03-30 NOTE — TELEPHONE ENCOUNTER
cyclobenzaprine (FLEXERIL) 10 MG tablet 90 tablet 0 2/19/2017  No   Sig: TAKE 1 TABLET (10 MG) BY MOUTH 3 TIMES DAILY AS NEEDED FOR MUSCLE SPASMS   Class: E-Prescribe   Order: [id number]   E-Prescribing Status: Receipt confirmed by pharmacy (2/19/2017  7:13 PM CST)     Last visit 2/6/17    Марина Booth RN, [facility]

## 2017-04-01 DIAGNOSIS — G47.00 PERSISTENT INSOMNIA: ICD-10-CM

## 2017-04-01 DIAGNOSIS — M62.830 BACK MUSCLE SPASM: ICD-10-CM

## 2017-04-01 NOTE — TELEPHONE ENCOUNTER
cyclobenzaprine (FLEXERIL) 10 MG tablet filled 3/30/17.    amitriptyline (ELAVIL) 100 MG tablet filled 2/6/17.          Antoine Faarax  [facility]

## 2017-04-04 RX ORDER — AMITRIPTYLINE HYDROCHLORIDE 100 MG/1
TABLET ORAL
Qty: 90 TABLET | Refills: 0 | Status: SHIPPED | OUTPATIENT
Start: 2017-04-04 | End: 2018-04-05

## 2017-04-04 RX ORDER — CYCLOBENZAPRINE HCL 10 MG
TABLET ORAL
Qty: 90 TABLET | Refills: 0 | Status: SHIPPED | OUTPATIENT
Start: 2017-04-04 | End: 2018-04-05

## 2017-04-04 NOTE — TELEPHONE ENCOUNTER
Routing refill request to provider for review/approval because:  Drug not on the [facility] refill protocol - greg Loomis, Clinical RN [place] [place].

## 2017-04-26 DIAGNOSIS — E55.9 VITAMIN D DEFICIENCY: ICD-10-CM

## 2017-04-26 RX ORDER — ERGOCALCIFEROL 1.25 MG/1
CAPSULE, LIQUID FILLED ORAL
Qty: 12 CAPSULE | Refills: 0 | Status: SHIPPED | OUTPATIENT
Start: 2017-04-26 | End: 2017-04-29

## 2017-04-26 NOTE — TELEPHONE ENCOUNTER
Vitamin D 50,000      Last Written Prescription Date: 08/16/16  Last Fill Quantity: 8,  # refills: 3   Last Office Visit with [facility], [facility] or [facility] prescribing provider: 02/06/17    Routing refill request to provider for review/approval because:  Drug not on the [facility] refill protocol   Sakshi Feng RN

## 2017-04-29 DIAGNOSIS — E55.9 VITAMIN D DEFICIENCY: ICD-10-CM

## 2017-04-29 NOTE — TELEPHONE ENCOUNTER
vitamin D (ERGOCALCIFEROL) 18377 UNIT capsule filled 4/26/17.          Antoine Faarax  [facility]

## 2017-05-02 RX ORDER — ERGOCALCIFEROL 1.25 MG/1
CAPSULE, LIQUID FILLED ORAL
Qty: 12 CAPSULE | Refills: 0 | Status: SHIPPED | OUTPATIENT
Start: 2017-05-02 | End: 2018-04-05

## 2017-05-15 NOTE — TELEPHONE ENCOUNTER
Called patient. ACT updated, score 20.    Patient states that she will be turning 67 years old soon so her medical insurance has been terminated and currently is doing paperwork to try to get insurance again. She cannot come into the doctors office in the meantime.  DEBI Camacho.  Giuliano Sapp CMA

## 2017-05-16 ASSESSMENT — ASTHMA QUESTIONNAIRES: ACT_TOTALSCORE: 20

## 2017-05-23 DIAGNOSIS — G89.4 CHRONIC PAIN SYNDROME: ICD-10-CM

## 2017-05-23 RX ORDER — TRAMADOL HYDROCHLORIDE 50 MG/1
50 TABLET ORAL EVERY 8 HOURS PRN
Qty: 90 TABLET | Refills: 2 | Status: SHIPPED | OUTPATIENT
Start: 2017-05-23 | End: 2017-08-22

## 2017-05-23 NOTE — TELEPHONE ENCOUNTER
traMADol (ULTRAM) 50 MG tablet      Last Written Prescription Date:  02/06/17  Last Fill Quantity: 90,   # refills: 2  Last Office Visit with [facility] or [facility] prescribing provider: 02/06/17  Future Office visit:       Routing refill request to provider for review/approval because:  Drug not on the [facility], [facility] or  Health refill protocol or controlled substance      Katherin [facility]

## 2017-05-30 DIAGNOSIS — M62.830 BACK MUSCLE SPASM: ICD-10-CM

## 2017-05-30 RX ORDER — CYCLOBENZAPRINE HCL 10 MG
TABLET ORAL
Qty: 90 TABLET | Refills: 1 | Status: SHIPPED | OUTPATIENT
Start: 2017-05-30 | End: 2017-07-11

## 2017-07-11 DIAGNOSIS — M62.830 BACK MUSCLE SPASM: ICD-10-CM

## 2017-07-11 DIAGNOSIS — G47.00 PERSISTENT INSOMNIA: ICD-10-CM

## 2017-07-11 DIAGNOSIS — G89.4 CHRONIC PAIN SYNDROME: ICD-10-CM

## 2017-07-11 RX ORDER — AMITRIPTYLINE HYDROCHLORIDE 50 MG/1
TABLET ORAL
Qty: 90 TABLET | Refills: 1 | Status: SHIPPED | OUTPATIENT
Start: 2017-07-11 | End: 2018-02-12

## 2017-07-11 RX ORDER — CYCLOBENZAPRINE HCL 10 MG
TABLET ORAL
Qty: 90 TABLET | Refills: 1 | Status: SHIPPED | OUTPATIENT
Start: 2017-07-11 | End: 2017-09-13

## 2017-07-11 RX ORDER — AMITRIPTYLINE HYDROCHLORIDE 100 MG/1
TABLET ORAL
Qty: 90 TABLET | Refills: 1 | Status: SHIPPED | OUTPATIENT
Start: 2017-07-11 | End: 2018-04-05

## 2017-07-11 NOTE — TELEPHONE ENCOUNTER
amitriptyline (ELAVIL) 50 MG tablet      Last Written Prescription Date: 2/6/17  Last Quantity: 90, # refills: 1  Last [facility] Visit with [facility] or [facility] prescribing provider: 2/6/17       Creatinine   Date Value Ref Range Status   02/06/2017 0.99 0.52 - 1.04 mg/dL Final     Lab Results   Component Value Date    AST 28 02/06/2017     Lab Results   Component Value Date    ALT 33 02/06/2017     BP Readings from Last 3 Encounters:   02/06/17 132/88   08/18/16 (!) 164/92   01/18/16 135/90     amitriptyline (ELAVIL) 100 MG tablet      Last Written Prescription Date: 4/4/17  Last Quantity: 90, # refills: 0  Last Office Visit with [facility] or [facility] prescribing provider: 2/6/17       Creatinine   Date Value Ref Range Status   02/06/2017 0.99 0.52 - 1.04 mg/dL Final     Lab Results   Component Value Date    AST 28 02/06/2017     Lab Results   Component Value Date    ALT 33 02/06/2017     BP Readings from Last 3 Encounters:   02/06/17 132/88   08/18/16 (!) 164/92   01/18/16 135/90       cyclobenzaprine (FLEXERIL) 10 MG tablet      Last Written Prescription Date:  5/30/17  Last Fill Quantity: 90,   # refills: 1  Last [place] Visit with [facility] or [facility] prescribing provider: 2/6/17  Future Office visit:       Routing refill request to provider for review/approval because:  Drug not on the [facility], [facility] or [facility] refill protocol or controlled substance      Antoine Faarax  [facility]

## 2017-07-21 DIAGNOSIS — M62.830 BACK MUSCLE SPASM: ICD-10-CM

## 2017-07-21 RX ORDER — CYCLOBENZAPRINE HCL 10 MG
TABLET ORAL
Qty: 90 TABLET | Refills: 1 | OUTPATIENT
Start: 2017-07-21

## 2017-07-21 NOTE — TELEPHONE ENCOUNTER
Flexeril 10 mg      Last Written Prescription Date: 07/11/17  Last Fill Quantity: 90,  # refills: 1   Last Office Visit with [facility] prescribing provider: 02/06/17                                               RN called pharmacy and request was sent by mistake.    Sakshi Feng RN

## 2017-08-22 DIAGNOSIS — G89.4 CHRONIC PAIN SYNDROME: ICD-10-CM

## 2017-08-23 RX ORDER — TRAMADOL HYDROCHLORIDE 50 MG/1
50 TABLET ORAL EVERY 8 HOURS PRN
Qty: 30 TABLET | Refills: 0 | Status: SHIPPED | OUTPATIENT
Start: 2017-08-23 | End: 2017-09-14

## 2017-08-23 NOTE — TELEPHONE ENCOUNTER
traMADol (ULTRAM) 50 MG tablet      Last Written Prescription Date:  5/23/17  Last Fill Quantity: 90,   # refills: 2  Last Office Visit with [facility] or  Health prescribing provider: 2/6/17  Future Office visit:       Routing refill request to provider for review/approval because:  Drug not on the [facility], [facility] or  Health refill protocol or controlled substance      Alishazack Archuleta   Radiology

## 2017-08-23 NOTE — TELEPHONE ENCOUNTER
..Reason for Call: prescription    Detailed comments: Justina called from the pharmacy said she need the CRYSTAL# for the TreMAdol script    Phone Number Patient can be reached at: [phone]    Best Time: anytime    Can we leave a detailed message on this number? YES    Call taken on 8/23/2017 at 9:57 AM by Roberto Lara

## 2017-08-24 NOTE — TELEPHONE ENCOUNTER
Called [facility] spoke to Poornima and CRYSTAL for Say is given to her to forward to the pharmacist.  Kit Toussaint,  [facility]

## 2017-09-13 DIAGNOSIS — G89.4 CHRONIC PAIN SYNDROME: ICD-10-CM

## 2017-09-13 DIAGNOSIS — M62.830 BACK MUSCLE SPASM: ICD-10-CM

## 2017-09-13 DIAGNOSIS — G47.00 PERSISTENT INSOMNIA: ICD-10-CM

## 2017-09-13 DIAGNOSIS — I10 ESSENTIAL HYPERTENSION WITH GOAL BLOOD PRESSURE LESS THAN 140/90: ICD-10-CM

## 2017-09-13 NOTE — TELEPHONE ENCOUNTER
amitriptyline       Last Written Prescription Date: 7/11/17  Last Quantity: 90, # refills: 1  [facility] [place] [facility] prescribing provider: 2/6/17       Creatinine   Date Value Ref Range Status   02/06/2017 0.99 0.52 - 1.04 mg/dL Final     Lab Results   Component Value Date    AST 28 02/06/2017     Lab Results   Component Value Date    ALT 33 02/06/2017     BP Readings from Last 3 Encounters:   02/06/17 132/88   08/18/16 (!) 164/92   01/18/16 135/90

## 2017-09-13 NOTE — TELEPHONE ENCOUNTER
cloNIDine       Last Written Prescription Date: 8/22/17  Last Fill Quantity: 30, # refills: 0  [place] [facility] [email or web address] or [facility] prescribing provider: 2/6/17       Potassium   Date Value Ref Range Status   02/06/2017 4.3 3.4 - 5.3 mmol/L Final     Creatinine   Date Value Ref Range Status   02/06/2017 0.99 0.52 - 1.04 mg/dL Final     BP Readings from Last 3 Encounters:   02/06/17 132/88   08/18/16 (!) 164/92   01/18/16 135/90     cyclobenzaprine (FLEXERIL) 10 MG tablet      Last Written Prescription Date:  7/11/17  Last Fill Quantity: 90,   # refills: 1  Last Office Visit with [facility] [email or web address] or  [facility] prescribing provider: 2/6/17  Future Office visit:       Routing refill request to provider for review/approval because:  Drug not on the [email or web address] or [facility] refill protocol or controlled substance

## 2017-09-14 NOTE — TELEPHONE ENCOUNTER
traMADol (ULTRAM) 50 MG tablet      Last Written Prescription Date:  08/23/17  Last Fill Quantity: 30,   # refills: 0  Last Office Visit with [facility] or [facility] prescribing provider: 02/06/17  Future Office visit:       Routing refill request to provider for review/approval because:  Drug not on the [facility], [facility] or [facility] refill protocol or controlled substance      Katherin [facility]

## 2017-09-14 NOTE — TELEPHONE ENCOUNTER
Routing refill request to provider for review/approval because:  Kalpana given x1 and patient did not follow up, please advise - clonidine. No future appointments found.  A break in medication - amitriptyline  Drug not on [facility] refill protocol - Tramadol and Flexiril   Nena Kent RN

## 2017-09-15 RX ORDER — CYCLOBENZAPRINE HCL 10 MG
TABLET ORAL
Qty: 90 TABLET | Refills: 1 | Status: SHIPPED | OUTPATIENT
Start: 2017-09-15 | End: 2017-11-27

## 2017-09-15 RX ORDER — TRAMADOL HYDROCHLORIDE 50 MG/1
50 TABLET ORAL EVERY 8 HOURS PRN
Qty: 30 TABLET | Refills: 0 | Status: SHIPPED | OUTPATIENT
Start: 2017-09-15 | End: 2017-09-20

## 2017-09-15 RX ORDER — AMITRIPTYLINE HYDROCHLORIDE 100 MG/1
TABLET ORAL
Qty: 90 TABLET | Refills: 1 | Status: SHIPPED | OUTPATIENT
Start: 2017-09-15 | End: 2018-04-05

## 2017-09-15 RX ORDER — CLONIDINE HYDROCHLORIDE 0.2 MG/1
TABLET ORAL
Qty: 90 TABLET | Refills: 10 | Status: SHIPPED | OUTPATIENT
Start: 2017-09-15 | End: 2018-04-05

## 2017-09-20 ENCOUNTER — TELEPHONE (OUTPATIENT)
Dept: FAMILY MEDICINE | Facility: CLINIC | Age: 67
End: 2017-09-20

## 2017-09-20 DIAGNOSIS — G89.4 CHRONIC PAIN SYNDROME: ICD-10-CM

## 2017-09-20 RX ORDER — TRAMADOL HYDROCHLORIDE 50 MG/1
50 TABLET ORAL EVERY 8 HOURS PRN
Qty: 90 TABLET | Refills: 1 | Status: SHIPPED | OUTPATIENT
Start: 2017-09-20 | End: 2017-11-27

## 2017-09-20 NOTE — TELEPHONE ENCOUNTER
Prescription was filled by another provider while I was out of office last time. I put in a new prescription for #90 and will sign tomorrow.  Tere Camacho MD

## 2017-09-20 NOTE — TELEPHONE ENCOUNTER
Reason for Call:  Medication or medication refill:    Do you use a [facility]?  Name of the pharmacy and phone number for the current request:     [facility] [phone]         Name of the medication requested: traMADol (ULTRAM) 50 MG tablet, Patient was expecting 90 pllls and received 30 day supply.    Other request:     Can we leave a detailed message on this number? YES    Phone number patient can be reached at:     Best Time: any    Call taken on 9/20/2017 at 2:04 PM by Leydi Medrano

## 2017-09-21 NOTE — TELEPHONE ENCOUNTER
Written rx faxed to the pharmacy, Pharmacy will contact pt when medication is ready for pickup.  Kit Toussaint,  [facility]

## 2017-11-20 ENCOUNTER — TELEPHONE (OUTPATIENT)
Dept: FAMILY MEDICINE | Facility: CLINIC | Age: 67
End: 2017-11-20

## 2017-11-21 NOTE — TELEPHONE ENCOUNTER
11/20/17    Patient is due for a Cervical/Pap screening.    Spoke to patient she stated she will call and schedule when she gets new insurance coverage.    Naomi Chiang  Outreach

## 2017-11-26 ENCOUNTER — HEALTH MAINTENANCE LETTER (OUTPATIENT)
Age: 67
End: 2017-11-26

## 2017-11-27 DIAGNOSIS — M62.830 BACK MUSCLE SPASM: ICD-10-CM

## 2017-11-27 DIAGNOSIS — G89.4 CHRONIC PAIN SYNDROME: ICD-10-CM

## 2017-11-27 NOTE — TELEPHONE ENCOUNTER
cyclobenzaprine (FLEXERIL) 10 MG tablet      Last Written Prescription Date:  09/15/17  Last Fill Quantity: 90,   # refills: 1  Last Office Visit: 02/06/17  Future Office visit:       Routing refill request to provider for review/approval because:  Drug not on the [facility], [facility] or [facility] refill protocol or controlled substance

## 2017-11-27 NOTE — TELEPHONE ENCOUNTER
traMADol (ULTRAM) 50 MG tablet      Last Written Prescription Date:  09/20/17  Last Fill Quantity: 90,   # refills: 1  Last Office Visit: 02/06/17  Future Office visit:       Routing refill request to provider for review/approval because:  Drug not on the [facility], [facility] or [facility] refill protocol or controlled substance

## 2017-11-30 RX ORDER — TRAMADOL HYDROCHLORIDE 50 MG/1
50 TABLET ORAL EVERY 8 HOURS PRN
Qty: 90 TABLET | Refills: 1 | Status: SHIPPED | OUTPATIENT
Start: 2017-11-30 | End: 2018-04-05

## 2017-11-30 RX ORDER — CYCLOBENZAPRINE HCL 10 MG
TABLET ORAL
Qty: 90 TABLET | Refills: 1 | Status: SHIPPED | OUTPATIENT
Start: 2017-11-30 | End: 2018-01-16

## 2018-01-16 DIAGNOSIS — M62.830 BACK MUSCLE SPASM: ICD-10-CM

## 2018-01-16 RX ORDER — CYCLOBENZAPRINE HCL 10 MG
TABLET ORAL
Qty: 90 TABLET | Refills: 1 | Status: SHIPPED | OUTPATIENT
Start: 2018-01-16 | End: 2018-04-05

## 2018-01-16 NOTE — TELEPHONE ENCOUNTER
Routing refill request to provider for review/approval because:  Drug not on the [facility] refill protocol     Christie Donaldson RN   [facility]

## 2018-01-16 NOTE — TELEPHONE ENCOUNTER
Requested Prescriptions   Pending Prescriptions Disp Refills     cyclobenzaprine (FLEXERIL) 10 MG tablet [Pharmacy Med Name:      Last Written Prescription Date:  11/30/17  Last Fill Quantity: 90,  # refills: 1   Last Office Visit with [facility], [facility] or [facility] prescribing provider:  2/6/17   Future Office Visit:      CYCLOBENZAPRINE HCL 10MG TABS] 90 tablet 1     Sig: TAKE ONE TABLET BY MOUTH THREE TIMES A DAY AS NEEDED    There is no refill protocol information for this order              Antoine Faarax  [facility]

## 2018-02-12 ENCOUNTER — TELEPHONE (OUTPATIENT)
Dept: FAMILY MEDICINE | Facility: CLINIC | Age: 68
End: 2018-02-12

## 2018-02-12 DIAGNOSIS — F33.41 RECURRENT MAJOR DEPRESSIVE DISORDER, IN PARTIAL REMISSION (H): Primary | ICD-10-CM

## 2018-02-12 NOTE — TELEPHONE ENCOUNTER
"Requested Prescriptions   Pending Prescriptions Disp Refills     amitriptyline (ELAVIL) 50 MG tablet [Pharmacy Med Name: AMITRIPTYLINE HCL 50MG TABS]  Last Written Prescription Date:  07/11/17  Last Fill Quantity: 90,  # refills: 1   Last Office Visit with [facility] or [facility] prescribing provider:  02/06/17   Future Office Visit:    90 tablet 1     Sig: TAKE ONE TABLET BY MOUTH AT BEDTIME. MAY TAKE ALONG WITH 100MG TABLET IF NEEDED    Tricyclic Antidepressants Protocol Failed    2/12/2018  2:29 PM       Failed - Blood pressure under 140/90    BP Readings from Last 3 Encounters:   02/06/17 132/88   08/18/16 (!) 164/92   01/18/16 135/90                Failed - Recent (12 mo) or future (30 d) visit with authorizing provider's specialty     Patient had office visit in the last year or has a visit in the next 30 days with authorizing provider.  See \"Patient Info\" tab in inbasket, or \"Choose Columns\" in Meds & Orders section of the refill encounter.            Passed - Patient is age 18 or older       Passed - No active pregnancy on record       Passed - No positive pregnancy test in past 12 months        cyclobenzaprine (FLEXERIL) 10 MG tablet [Pharmacy Med Name: CYCLOBENZAPRINE HCL 10MG TABS]  Last Written Prescription Date:  01/16/18  Last Fill Quantity: 90,  # refills: 1   Last Office Visit with [facility] or [facility] prescribing provider:  02/06/17   Future Office Visit:    90 tablet 1     Sig: TAKE ONE TABLET BY MOUTH THREE TIMES A DAY AS NEEDED    There is no refill protocol information for this order          "

## 2018-02-15 RX ORDER — CYCLOBENZAPRINE HCL 10 MG
TABLET ORAL
Qty: 90 TABLET | Refills: 1 | OUTPATIENT
Start: 2018-02-15

## 2018-02-15 NOTE — TELEPHONE ENCOUNTER
Please contact patient- cyclobenzaprine declined as should still have one refill available on rx written in January.  Lower quantity amitriptyline approved- patient needs appt for furhter refills.    Armando Basurto PA-C

## 2018-02-21 NOTE — TELEPHONE ENCOUNTER
This writer attempted to contact patient on 02/21/18    Reason for call rx refill request and left message to return call.    If patient calls back:   Patient contacted by 1st floor [facility] Team (MA/TC). Inform patient that someone from the team will contact them, document that pt called and route to care team.     Rachael Tucker MA

## 2018-02-22 NOTE — TELEPHONE ENCOUNTER
This writer attempted to contact Evelyn on 02/22/18      Reason for call refill request and left message to return call.      If patient calls back:   Patient contacted by 1st floor [facility] Team (MA/TC). Inform patient that someone from the team will contact them, document that pt called and route to care team.         Tl Cruz MA

## 2018-02-26 NOTE — TELEPHONE ENCOUNTER
This writer attempted to contact patient on 02/26/18      Reason for call rx refill and left message to return call.      If patient calls back:   Patient contacted by 1st floor [facility] Team (MA/TC). Inform patient that someone from the team will contact them, document that pt called and route to care team.         Mika Nobles, CMA

## 2018-03-10 RX ORDER — AMITRIPTYLINE HYDROCHLORIDE 50 MG/1
TABLET ORAL
Qty: 60 TABLET | Refills: 0 | Status: SHIPPED | OUTPATIENT
Start: 2018-03-10 | End: 2018-04-05

## 2018-04-05 ENCOUNTER — OFFICE VISIT (OUTPATIENT)
Dept: FAMILY MEDICINE | Facility: CLINIC | Age: 68
End: 2018-04-05
Payer: MEDICARE

## 2018-04-05 ENCOUNTER — TELEPHONE (OUTPATIENT)
Dept: FAMILY MEDICINE | Facility: CLINIC | Age: 68
End: 2018-04-05

## 2018-04-05 VITALS
OXYGEN SATURATION: 98 % | TEMPERATURE: 98.3 F | HEIGHT: 64 IN | HEART RATE: 112 BPM | DIASTOLIC BLOOD PRESSURE: 101 MMHG | WEIGHT: 194 LBS | BODY MASS INDEX: 33.12 KG/M2 | SYSTOLIC BLOOD PRESSURE: 132 MMHG

## 2018-04-05 DIAGNOSIS — E11.22 TYPE 2 DIABETES MELLITUS WITH STAGE 3 CHRONIC KIDNEY DISEASE, WITHOUT LONG-TERM CURRENT USE OF INSULIN (H): ICD-10-CM

## 2018-04-05 DIAGNOSIS — M62.830 BACK MUSCLE SPASM: ICD-10-CM

## 2018-04-05 DIAGNOSIS — R73.9 ELEVATED BLOOD SUGAR: ICD-10-CM

## 2018-04-05 DIAGNOSIS — R16.0 HEPATOMEGALY: ICD-10-CM

## 2018-04-05 DIAGNOSIS — Z23 NEED FOR PROPHYLACTIC VACCINATION AGAINST STREPTOCOCCUS PNEUMONIAE (PNEUMOCOCCUS): ICD-10-CM

## 2018-04-05 DIAGNOSIS — J45.31 MILD PERSISTENT ASTHMA WITH ACUTE EXACERBATION: ICD-10-CM

## 2018-04-05 DIAGNOSIS — E53.8 VITAMIN B12 DEFICIENCY (NON ANEMIC): ICD-10-CM

## 2018-04-05 DIAGNOSIS — Z78.0 ASYMPTOMATIC POSTMENOPAUSAL STATUS: ICD-10-CM

## 2018-04-05 DIAGNOSIS — G93.32 CHRONIC FATIGUE FIBROMYALGIA SYNDROME: ICD-10-CM

## 2018-04-05 DIAGNOSIS — D50.8 OTHER IRON DEFICIENCY ANEMIA: ICD-10-CM

## 2018-04-05 DIAGNOSIS — N18.30 TYPE 2 DIABETES MELLITUS WITH STAGE 3 CHRONIC KIDNEY DISEASE, WITHOUT LONG-TERM CURRENT USE OF INSULIN (H): ICD-10-CM

## 2018-04-05 DIAGNOSIS — E78.1 HYPERTRIGLYCERIDEMIA: ICD-10-CM

## 2018-04-05 DIAGNOSIS — G62.9 NEUROPATHY: ICD-10-CM

## 2018-04-05 DIAGNOSIS — E55.9 VITAMIN D DEFICIENCY: ICD-10-CM

## 2018-04-05 DIAGNOSIS — M79.7 CHRONIC FATIGUE FIBROMYALGIA SYNDROME: ICD-10-CM

## 2018-04-05 DIAGNOSIS — E03.4 HYPOTHYROIDISM DUE TO ACQUIRED ATROPHY OF THYROID: ICD-10-CM

## 2018-04-05 DIAGNOSIS — F33.41 RECURRENT MAJOR DEPRESSIVE DISORDER, IN PARTIAL REMISSION (H): ICD-10-CM

## 2018-04-05 DIAGNOSIS — J45.30 MILD PERSISTENT ASTHMA WITHOUT COMPLICATION: ICD-10-CM

## 2018-04-05 DIAGNOSIS — G47.00 PERSISTENT INSOMNIA: ICD-10-CM

## 2018-04-05 DIAGNOSIS — I10 ESSENTIAL HYPERTENSION WITH GOAL BLOOD PRESSURE LESS THAN 140/90: ICD-10-CM

## 2018-04-05 DIAGNOSIS — G89.4 CHRONIC PAIN SYNDROME: Primary | ICD-10-CM

## 2018-04-05 DIAGNOSIS — Z12.11 SCREEN FOR COLON CANCER: ICD-10-CM

## 2018-04-05 DIAGNOSIS — I10 HYPERTENSION, GOAL BELOW 140/90: ICD-10-CM

## 2018-04-05 LAB
ALBUMIN SERPL-MCNC: 3.7 G/DL (ref 3.4–5)
ALP SERPL-CCNC: 108 U/L (ref 40–150)
ALT SERPL W P-5'-P-CCNC: 50 U/L (ref 0–50)
ANION GAP SERPL CALCULATED.3IONS-SCNC: 9 MMOL/L (ref 3–14)
AST SERPL W P-5'-P-CCNC: 48 U/L (ref 0–45)
BILIRUB SERPL-MCNC: 0.3 MG/DL (ref 0.2–1.3)
BUN SERPL-MCNC: 9 MG/DL (ref 7–30)
CALCIUM SERPL-MCNC: 9.3 MG/DL (ref 8.5–10.1)
CHLORIDE SERPL-SCNC: 109 MMOL/L (ref 94–109)
CHOLEST SERPL-MCNC: 170 MG/DL
CO2 SERPL-SCNC: 23 MMOL/L (ref 20–32)
CREAT SERPL-MCNC: 0.94 MG/DL (ref 0.52–1.04)
ERYTHROCYTE [DISTWIDTH] IN BLOOD BY AUTOMATED COUNT: 14.9 % (ref 10–15)
GFR SERPL CREATININE-BSD FRML MDRD: 59 ML/MIN/1.7M2
GLUCOSE SERPL-MCNC: 128 MG/DL (ref 70–99)
HBA1C MFR BLD: 6.6 % (ref 0–6.4)
HCT VFR BLD AUTO: 43.4 % (ref 35–47)
HDLC SERPL-MCNC: 64 MG/DL
HGB BLD-MCNC: 13.9 G/DL (ref 11.7–15.7)
LDLC SERPL CALC-MCNC: 76 MG/DL
MCH RBC QN AUTO: 29 PG (ref 26.5–33)
MCHC RBC AUTO-ENTMCNC: 32 G/DL (ref 31.5–36.5)
MCV RBC AUTO: 91 FL (ref 78–100)
NONHDLC SERPL-MCNC: 106 MG/DL
PLATELET # BLD AUTO: 332 10E9/L (ref 150–450)
POTASSIUM SERPL-SCNC: 3.7 MMOL/L (ref 3.4–5.3)
PROT SERPL-MCNC: 7.9 G/DL (ref 6.8–8.8)
RBC # BLD AUTO: 4.79 10E12/L (ref 3.8–5.2)
SODIUM SERPL-SCNC: 141 MMOL/L (ref 133–144)
TRIGL SERPL-MCNC: 151 MG/DL
TSH SERPL DL<=0.005 MIU/L-ACNC: 2.8 MU/L (ref 0.4–4)
WBC # BLD AUTO: 8.4 10E9/L (ref 4–11)

## 2018-04-05 PROCEDURE — 36415 COLL VENOUS BLD VENIPUNCTURE: CPT | Performed by: FAMILY MEDICINE

## 2018-04-05 PROCEDURE — 84443 ASSAY THYROID STIM HORMONE: CPT | Performed by: FAMILY MEDICINE

## 2018-04-05 PROCEDURE — 80053 COMPREHEN METABOLIC PANEL: CPT | Performed by: FAMILY MEDICINE

## 2018-04-05 PROCEDURE — 83036 HEMOGLOBIN GLYCOSYLATED A1C: CPT | Performed by: FAMILY MEDICINE

## 2018-04-05 PROCEDURE — 82306 VITAMIN D 25 HYDROXY: CPT | Performed by: FAMILY MEDICINE

## 2018-04-05 PROCEDURE — 99214 OFFICE O/P EST MOD 30 MIN: CPT | Performed by: FAMILY MEDICINE

## 2018-04-05 PROCEDURE — 80061 LIPID PANEL: CPT | Performed by: FAMILY MEDICINE

## 2018-04-05 PROCEDURE — 85027 COMPLETE CBC AUTOMATED: CPT | Performed by: FAMILY MEDICINE

## 2018-04-05 RX ORDER — CYCLOBENZAPRINE HCL 10 MG
10 TABLET ORAL 2 TIMES DAILY PRN
Qty: 90 TABLET | Refills: 3 | Status: SHIPPED | OUTPATIENT
Start: 2018-04-05 | End: 2018-04-13

## 2018-04-05 RX ORDER — HYDROXYZINE HYDROCHLORIDE 25 MG/1
50 TABLET, FILM COATED ORAL
Qty: 30 TABLET | Refills: 6 | Status: SHIPPED | OUTPATIENT
Start: 2018-04-05 | End: 2018-10-04

## 2018-04-05 RX ORDER — CYANOCOBALAMIN 1000 UG/ML
1000 INJECTION, SOLUTION INTRAMUSCULAR; SUBCUTANEOUS
Qty: 1 ML | Refills: 11 | Status: SHIPPED | OUTPATIENT
Start: 2018-04-05 | End: 2018-04-20

## 2018-04-05 RX ORDER — CLONIDINE HYDROCHLORIDE 0.2 MG/1
0.2 TABLET ORAL 3 TIMES DAILY
Qty: 270 TABLET | Refills: 3 | Status: SHIPPED | OUTPATIENT
Start: 2018-04-05 | End: 2019-05-24

## 2018-04-05 RX ORDER — CYANOCOBALAMIN 1000 UG/ML
1000 INJECTION, SOLUTION INTRAMUSCULAR; SUBCUTANEOUS
Qty: 0.9 ML | Refills: 11 | Status: SHIPPED | OUTPATIENT
Start: 2018-04-05 | End: 2018-04-05

## 2018-04-05 RX ORDER — ALBUTEROL SULFATE 90 UG/1
2 AEROSOL, METERED RESPIRATORY (INHALATION) EVERY 6 HOURS PRN
Qty: 1 INHALER | Refills: 11 | Status: SHIPPED | OUTPATIENT
Start: 2018-04-05 | End: 2019-05-11

## 2018-04-05 RX ORDER — CHOLECALCIFEROL (VITAMIN D3) 50 MCG
2000 TABLET ORAL DAILY
Qty: 100 TABLET | Refills: 3 | Status: SHIPPED | OUTPATIENT
Start: 2018-04-05 | End: 2020-01-23

## 2018-04-05 RX ORDER — GABAPENTIN 300 MG/1
300 CAPSULE ORAL
Qty: 180 CAPSULE | Refills: 3 | Status: SHIPPED | OUTPATIENT
Start: 2018-04-05 | End: 2019-05-24

## 2018-04-05 RX ORDER — FERROUS SULFATE 325(65) MG
325 TABLET ORAL 2 TIMES DAILY
Qty: 60 TABLET | Refills: 11 | Status: SHIPPED | OUTPATIENT
Start: 2018-04-05 | End: 2021-11-08

## 2018-04-05 RX ORDER — AMITRIPTYLINE HYDROCHLORIDE 100 MG/1
100 TABLET ORAL AT BEDTIME
Qty: 90 TABLET | Refills: 3 | Status: SHIPPED | OUTPATIENT
Start: 2018-04-05 | End: 2019-02-05

## 2018-04-05 RX ORDER — LOSARTAN POTASSIUM 50 MG/1
50 TABLET ORAL DAILY
Qty: 90 TABLET | Refills: 3 | Status: SHIPPED | OUTPATIENT
Start: 2018-04-05 | End: 2019-05-24

## 2018-04-05 RX ORDER — TRAMADOL HYDROCHLORIDE 50 MG/1
50 TABLET ORAL EVERY 8 HOURS PRN
Qty: 90 TABLET | Refills: 1 | Status: SHIPPED | OUTPATIENT
Start: 2018-04-05 | End: 2018-06-11

## 2018-04-05 RX ORDER — AMITRIPTYLINE HYDROCHLORIDE 50 MG/1
50 TABLET ORAL AT BEDTIME
Qty: 90 TABLET | Refills: 3 | Status: SHIPPED | OUTPATIENT
Start: 2018-04-05 | End: 2019-02-07

## 2018-04-05 RX ORDER — ALBUTEROL SULFATE 0.83 MG/ML
1 SOLUTION RESPIRATORY (INHALATION) EVERY 6 HOURS PRN
Qty: 360 ML | Refills: 11 | Status: SHIPPED | OUTPATIENT
Start: 2018-04-05 | End: 2019-11-15

## 2018-04-05 ASSESSMENT — PAIN SCALES - GENERAL: PAINLEVEL: SEVERE PAIN (7)

## 2018-04-05 NOTE — TELEPHONE ENCOUNTER
...Reason for Call:   prescription    Detailed comments: The B12 injection need clarification on dosage     Phone Number Patient can be reached at: [phone]    Best Time: anytime    Can we leave a detailed message on this number? YES    Call taken on 4/5/2018 at 11:28 AM by Roberto Lara

## 2018-04-05 NOTE — PATIENT INSTRUCTIONS
At [facility], we strive to deliver an exceptional experience to you, every time we see you.  If you receive a survey in the mail, please send us back your thoughts. We really do value your feedback.    Based on your medical history, these are the current health maintenance/preventive care services that you are due for (some may have been done at this visit.)  Health Maintenance Due   Topic Date Due     URINE DRUG SCREEN Q1 YR  05/29/1965     FIT Q1 YR  08/09/2015     PAP Q3 YR  08/16/2016     ASTHMA ACTION PLAN Q1 YR  01/07/2017     FALL RISK ASSESSMENT  05/29/2017     DEXA SCAN SCREENING (SYSTEM ASSIGNED)  05/29/2017     PNEUMOCOCCAL (1 of 2 - PCV13) 05/29/2017     ASTHMA CONTROL TEST Q6 MOS  08/20/2017     ALLIE QUESTIONNAIRE 1 YEAR  02/06/2018     PHQ-9 Q1YR  02/06/2018         Suggested websites for health information:  Www.[employer].Beddit : Up to date and easily searchable information on multiple topics.  Www.medlineplus.gov : medication info, interactive tutorials, watch real surgeries online  Www.familydoctor.org : good info from the Academy of Family Physicians  Www.cdc.gov : public health info, travel advisories, epidemics (H1N1)  Www.aap.org : children's health info, normal development, vaccinations  Www.health.state.[place].us : [place] dept of health, public health issues in [place], N1N1    Your care team:                            Family Medicine Internal Medicine   MD Anthony Ojeda MD Shantel Branch-Fleming, MD Katya Georgiev PA-C Nam Ho, MD Pediatrics   ELIOT Rutledge, MD Jolly Jiménez CNP, MD Deborah Mielke, MD Kim Thein, APRN CNP      Clinic hours: Monday - Thursday 7 am-7 pm; Fridays 7 am-5 pm.   Urgent care: Monday - Friday 11 am-9 pm; Saturday and Sunday 9 am-5 pm.  Pharmacy : Monday -Thursday 8 am-8 pm; Friday 8 am-6 pm; Saturday and Sunday 9 am-5 pm.     Clinic: [phone]   Pharmacy: [phone]      *Abdominal Pain, Unknown Cause (Female)    The exact cause of your abdominal (stomach) pain is not certain. This does not mean that this is something to worry about, or the right tests were not done. Everyone likes to know the exact cause of the problem, but sometimes with abdominal pain, there is no clear-cut cause, and this could be a good thing. The good news is that your symptoms can be treated, and you will feel better.   Your condition does not seem serious now; however, sometimes the signs of a serious problem may take more time to appear. For this reason, it is important for you to watch for any new symptoms, problems, or worsening of your condition.  Over the next few days, the abdominal pain may come and go, or be continuous. Other common symptoms can include nausea and vomiting. Sometimes it can be difficult to tell if you feel nauseous, you may just feel bad and not associate that feeling with nausea. Constipation, diarrhea, and a fever may go along with the pain.  The pain may continue even if treated correctly over the following days. Depending on how things go, sometimes the cause can become clear and may require further or different treatment. Additional evaluations, medications, or tests may be needed.  Home care  Your health care provider may prescribe medications for pain, symptoms, or an infection.  Follow the health care provider's instructions for taking these medications.  General care    Rest until your next exam. No strenuous activities.    Try to find positions that ease discomfort. A small pillow placed on the abdomen may help relieve pain.    Something warm on your abdomen (such as a heating pad) may help, but be careful not to burn yourself.  Diet    Do not force yourself to eat, especially if having cramps, vomiting, or diarrhea.    Water is important so you do not get dehydrated. Soup may also be good. Sports drinks may also help, especially if they are not too acidic.  Make sure you don't drink sugary drinks as this can make things worse. Take liquids in small amounts. Do not guzzle them.    Caffeine sometimes makes the pain and cramping worse.    Avoid dairy products if you have vomiting or diarrhea.    Don't eat large amounts at a time. Wait a few minutes between bites.    Eat a diet low in fiber (called a low-residue diet). Foods allowed include refined breads, white rice, fruit and vegetable juices without pulp, tender meats. These foods will pass more easily through the intestine.    Avoid fried or fatty foods, dairy, alcohol and spicy foods until your symptoms go away.  Follow-up care  Follow up with your health care provider as instructed, or if your pain does not begin to improve in the next 24 hours.  When to seek medical care  Seek prompt medical care if any of the following occur:    Pain gets worse or moves to the right lower abdomen    New or worsening vomiting or diarrhea    Swelling of the abdomen    Unable to pass stool for more than three days    New fever over 101  F (38.3 C), or rising fever    Blood in vomit or bowel movements (dark red or black color)    Jaundice (yellow color of eyes and skin)    Weakness, dizziness    Chest, arm, back, neck or jaw pain    Unexpected vaginal bleeding or missed period  Call 911  Call emergency services if any of the following occur:    Trouble breathing    Confusion    Fainting or loss of consciousness    Rapid heart rate    Seizure    [id number] [facility], [address] [place] [address], [place] [address]. All rights reserved. This information is not intended as a substitute for professional medical care. Always follow your healthcare professional's instructions.

## 2018-04-05 NOTE — MR AVS SNAPSHOT
After Visit Summary   4/5/2018    Evelyn Vargas    MRN: [id number]           Patient Information     Date Of Birth          1950        Visit Information        Provider Department      4/5/2018 10:40 AM Tere Camacho MD [facility]        Today's Diagnoses     Chronic pain syndrome    -  1    Screen for colon cancer        Asymptomatic postmenopausal status        Screening for malignant neoplasm of cervix        Need for prophylactic vaccination against Streptococcus pneumoniae (pneumococcus)        Mild persistent asthma with acute exacerbation        Hypothyroidism due to acquired atrophy of thyroid        Hypertension, goal below 140/90        Myeloproliferative disorder (H)        Chronic fatigue fibromyalgia syndrome        Mild persistent asthma without complication        Persistent insomnia        Recurrent major depressive disorder, in partial remission (H)        Vitamin D deficiency        Essential hypertension with goal blood pressure less than 140/90        Vitamin B12 deficiency (non anemic)        Back muscle spasm        Other iron deficiency anemia        Neuropathy        Hepatomegaly          Care Instructions    At [facility], we strive to deliver an exceptional experience to you, every time we see you.  If you receive a survey in the mail, please send us back your thoughts. We really do value your feedback.    Based on your medical history, these are the current health maintenance/preventive care services that you are due for (some may have been done at this visit.)  Health Maintenance Due   Topic Date Due     URINE DRUG SCREEN Q1 YR  05/29/1967     FIT Q1 YR  08/09/2015     PAP Q3 YR  08/16/2016     ASTHMA ACTION PLAN Q1 YR  01/07/2017     FALL RISK ASSESSMENT  05/29/2017     DEXA SCAN SCREENING (SYSTEM ASSIGNED)  05/29/2017     PNEUMOCOCCAL (1 of 2 - PCV13) 05/29/2017     ASTHMA CONTROL TEST Q6 MOS  08/20/2017     ALLIE  QUESTIONNAIRE 1 YEAR  02/06/2018     PHQ-9 Q1YR  02/06/2018         Suggested websites for health information:  Www.Eyeon[employer].org : Up to date and easily searchable information on multiple topics.  Www.medlineplus.gov : medication info, interactive tutorials, watch real surgeries online  Www.familydoctor.org : good info from the Academy of Family Physicians  Www.cdc.gov : public health info, travel advisories, epidemics (H1N1)  Www.aap.org : children's health info, normal development, vaccinations  Www.health.[facility].[place].us : [place] dept of health, public health issues in [place], N1N1    Your care team:                            Family Medicine Internal Medicine   MD Anthony Ojeda MD Shantel Branch-Fleming, MD Katya Georgiev PA-C Nam Ho, MD Pediatrics   ELIOT Rutledge, CNP Mey Davies APRN CNP   MD Jolly Pascual MD Deborah Mielke, MD Kim Thein, APRN [facility]      Clinic hours: Monday - Thursday 7 am-7 pm; Fridays 7 am-5 pm.   Urgent care: Monday - Friday 11 am-9 pm; Saturday and Sunday 9 am-5 pm.  Pharmacy : Monday -Thursday 8 am-8 pm; Friday 8 am-6 pm; Saturday and Sunday 9 am-5 pm.     Clinic: [phone]   Pharmacy: [phone]      *Abdominal Pain, Unknown Cause (Female)    The exact cause of your abdominal (stomach) pain is not certain. This does not mean that this is something to worry about, or the right tests were not done. Everyone likes to know the exact cause of the problem, but sometimes with abdominal pain, there is no clear-cut cause, and this could be a good thing. The good news is that your symptoms can be treated, and you will feel better.   Your condition does not seem serious now; however, sometimes the signs of a serious problem may take more time to appear. For this reason, it is important for you to watch for any new symptoms, problems, or worsening of your condition.  Over the next few days, the abdominal pain may come and go, or be  continuous. Other common symptoms can include nausea and vomiting. Sometimes it can be difficult to tell if you feel nauseous, you may just feel bad and not associate that feeling with nausea. Constipation, diarrhea, and a fever may go along with the pain.  The pain may continue even if treated correctly over the following days. Depending on how things go, sometimes the cause can become clear and may require further or different treatment. Additional evaluations, medications, or tests may be needed.  Home care  Your health care provider may prescribe medications for pain, symptoms, or an infection.  Follow the health care provider's instructions for taking these medications.  General care    Rest until your next exam. No strenuous activities.    Try to find positions that ease discomfort. A small pillow placed on the abdomen may help relieve pain.    Something warm on your abdomen (such as a heating pad) may help, but be careful not to burn yourself.  Diet    Do not force yourself to eat, especially if having cramps, vomiting, or diarrhea.    Water is important so you do not get dehydrated. Soup may also be good. Sports drinks may also help, especially if they are not too acidic. Make sure you don't drink sugary drinks as this can make things worse. Take liquids in small amounts. Do not guzzle them.    Caffeine sometimes makes the pain and cramping worse.    Avoid dairy products if you have vomiting or diarrhea.    Don't eat large amounts at a time. Wait a few minutes between bites.    Eat a diet low in fiber (called a low-residue diet). Foods allowed include refined breads, white rice, fruit and vegetable juices without pulp, tender meats. These foods will pass more easily through the intestine.    Avoid fried or fatty foods, dairy, alcohol and spicy foods until your symptoms go away.  Follow-up care  Follow up with your health care provider as instructed, or if your pain does not begin to improve in the next 24  hours.  When to seek medical care  Seek prompt medical care if any of the following occur:    Pain gets worse or moves to the right lower abdomen    New or worsening vomiting or diarrhea    Swelling of the abdomen    Unable to pass stool for more than three days    New fever over 101  F (38.3 C), or rising fever    Blood in vomit or bowel movements (dark red or black color)    Jaundice (yellow color of eyes and skin)    Weakness, dizziness    Chest, arm, back, neck or jaw pain    Unexpected vaginal bleeding or missed period  Call 911  Call emergency services if any of the following occur:    Trouble breathing    Confusion    Fainting or loss of consciousness    Rapid heart rate    Seizure    [id number] [facility], [address] [place] [address], [place] [address]. All rights reserved. This information is not intended as a substitute for professional medical care. Always follow your healthcare professional's instructions.                Follow-ups after your visit        Follow-up notes from your care team     Return in about 3 months (around 7/5/2018) for medication follow up, recheck.      Future tests that were ordered for you today     Open Future Orders        Priority Expected Expires Ordered    US Abdomen Complete Routine  4/5/2019 4/5/2018    DEXA HIP/PELVIS/SPINE - Future Routine  4/5/2019 4/5/2018    Fecal colorectal cancer screen FIT - Future (S+30) Routine 4/26/2018 5/5/2018 4/5/2018            Who to contact     If you have questions or need follow up information about today's clinic visit or your schedule please contact [facility] directly at [phone].  Normal or non-critical lab and imaging results will be communicated to you by MyChart, letter or phone within 4 business days after the clinic has received the results. If you do not hear from us within 7 days, please contact the clinic through MyChart or phone. If you have a critical or abnormal lab result, we will notify you by  "phone as soon as possible.  Submit refill requests through [employer] or call your pharmacy and they will forward the refill request to us. Please allow 3 business days for your refill to be completed.          Additional Information About Your Visit        [employer] Information     [employer] lets you send messages to your doctor, view your test results, renew your prescriptions, schedule appointments and more. To sign up, go to [email or web address] . Click on \"Log in\" on the left side of the screen, which will take you to the Welcome page. Then click on \"Sign up Now\" on the right side of the page.     You will be asked to enter the access code listed below, as well as some personal information. Please follow the directions to create your username and password.     Your access code is: [email or web address]  Expires: 2018 11:14 AM     Your access code will  in 90 days. If you need help or a new code, please call your [facility] clinic or [phone].        Care EveryWhere ID     This is your Care EveryWhere ID. This could be used by other organizations to access your [facility] medical records  [id number]        Your Vitals Were     Pulse Temperature Height Pulse Oximetry BMI (Body Mass Index)       112 98.3  F (36.8  C) (Oral) 5' 4.25\" (1.632 m) 98% 33.04 kg/m2        Blood Pressure from Last 3 Encounters:   18 (!) 132/101   17 132/88   16 (!) 164/92    Weight from Last 3 Encounters:   18 194 lb (88 kg)   17 189 lb (85.7 kg)   16 177 lb 6.4 oz (80.5 kg)              We Performed the Following     Pneumococcal vaccine 13 valent PCV13 IM (Prevnar) [90237]          Today's Medication Changes          These changes are accurate as of 18 11:14 AM.  If you have any questions, ask your nurse or doctor.               These medicines have changed or have updated prescriptions.        Dose/Directions    * amitriptyline 100 MG tablet   Commonly known as:  ELAVIL   This may have changed:  " See the new instructions.   Used for:  Persistent insomnia   Changed by:  Tere Camacho MD        Dose:  100 mg   Take 1 tablet (100 mg) by mouth At Bedtime   Quantity:  90 tablet   Refills:  3       * amitriptyline 50 MG tablet   Commonly known as:  ELAVIL   This may have changed:  See the new instructions.   Used for:  Recurrent major depressive disorder, in partial remission (H)   Changed by:  Tere Camacho MD        Dose:  50 mg   Take 1 tablet (50 mg) by mouth At Bedtime   Quantity:  90 tablet   Refills:  3       cloNIDine 0.2 MG tablet   Commonly known as:  CATAPRES   This may have changed:  See the new instructions.   Used for:  Essential hypertension with goal blood pressure less than 140/90   Changed by:  Tere Camacho MD        Dose:  0.2 mg   Take 1 tablet (0.2 mg) by mouth 3 times daily   Quantity:  270 tablet   Refills:  3       cyclobenzaprine 10 MG tablet   Commonly known as:  FLEXERIL   This may have changed:  See the new instructions.   Used for:  Back muscle spasm   Changed by:  Tere Camacho MD        Dose:  10 mg   Take 1 tablet (10 mg) by mouth 2 times daily as needed for muscle spasms   Quantity:  90 tablet   Refills:  3       * Notice:  This list has 2 medication(s) that are the same as other medications prescribed for you. Read the directions carefully, and ask your doctor or other care provider to review them with you.         Where to get your medicines      These medications were sent to [address] [place] [address]     Phone:  [phone]     albuterol (2.5 MG/3ML) 0.083% neb solution    albuterol 108 (90 BASE) MCG/ACT Inhaler    amitriptyline 100 MG tablet    amitriptyline 50 MG tablet    cloNIDine 0.2 MG tablet    cyanocobalamin 1000 MCG/ML injection    cyclobenzaprine 10 MG tablet    ferrous sulfate 325 (65 FE) MG tablet    gabapentin 300 MG capsule    hydrOXYzine 25 MG  tablet    losartan 50 MG tablet    vitamin D 2000 UNITS tablet         Some of these will need a paper prescription and others can be bought over the counter.  Ask your nurse if you have questions.     Bring a paper prescription for each of these medications     traMADol 50 MG tablet                Primary Care Provider Office Phone # Fax #    Tere Jacinta Camacho [phone]       [address]  [facility] [address]        Equal Access to Services     MAYNOR SUTHERLAND : Hadii aad ku hadasho Soomaali, waaxda luqadaha, qaybta kaalmada adeegyada, waxay idiin hayaan adeeg kharash la'teena . So [facility] [phone].    ATENCIÓN: Si habla espgissel, tiene a howard disposición servicios gratuitos de asistencia lingüística. Llame al [phone].    We comply with applicable federal civil rights laws and [place] laws. We do not discriminate on the basis of race, color, national origin, age, disability, sex, sexual orientation, or gender identity.            Thank you!     Thank you for choosing [facility]  for your care. Our goal is always to provide you with excellent care. Hearing back from our patients is one way we can continue to improve our services. Please take a few minutes to complete the written survey that you may receive in the mail after your visit with us. Thank you!             Your Updated Medication List - Protect others around you: Learn how to safely use, store and throw away your medicines at www.disposemymeds.org.          This list is accurate as of 4/5/18 11:14 AM.  Always use your most recent med list.                   Brand Name Dispense Instructions for use Diagnosis    * albuterol 108 (90 BASE) MCG/ACT Inhaler    PROAIR HFA/PROVENTIL HFA/VENTOLIN HFA    1 Inhaler    Inhale 2 puffs into the lungs every 6 hours as needed for shortness of breath / dyspnea    Mild persistent asthma without complication       * albuterol (2.5 MG/3ML) 0.083% neb solution     360 mL    Take  1 vial (2.5 mg) by nebulization every 6 hours as needed    Mild persistent asthma without complication       * amitriptyline 100 MG tablet    ELAVIL    90 tablet    Take 1 tablet (100 mg) by mouth At Bedtime    Persistent insomnia       * amitriptyline 50 MG tablet    ELAVIL    90 tablet    Take 1 tablet (50 mg) by mouth At Bedtime    Recurrent major depressive disorder, in partial remission (H)       cloNIDine 0.2 MG tablet    CATAPRES    270 tablet    Take 1 tablet (0.2 mg) by mouth 3 times daily    Essential hypertension with goal blood pressure less than 140/90       cyanocobalamin 1000 MCG/ML injection    VITAMIN B12    0.9 mL    Inject 1 mL (1,000 mcg) into the muscle every 30 days    Vitamin B12 deficiency (non anemic)       cyclobenzaprine 10 MG tablet    FLEXERIL    90 tablet    Take 1 tablet (10 mg) by mouth 2 times daily as needed for muscle spasms    Back muscle spasm       ferrous sulfate 325 (65 FE) MG tablet    IRON    60 tablet    Take 1 tablet (325 mg) by mouth 2 times daily    Other iron deficiency anemia       gabapentin 300 MG capsule    NEURONTIN    180 capsule    Take 1 capsule (300 mg) by mouth 2 times daily Arthritis, non-child proof caps    Neuropathy       hydrOXYzine 25 MG tablet    ATARAX    30 tablet    Take 2 tablets (50 mg) by mouth nightly as needed for other (sleep)    Persistent insomnia       losartan 50 MG tablet    COZAAR    90 tablet    Take 1 tablet (50 mg) by mouth daily    Essential hypertension with goal blood pressure less than 140/90       traMADol 50 MG tablet    ULTRAM    90 tablet    Take 1 tablet (50 mg) by mouth every 8 hours as needed for moderate pain    Chronic pain syndrome       vitamin D 2000 UNITS tablet     100 tablet    Take 2,000 Units by mouth daily    Vitamin D deficiency       * Notice:  This list has 4 medication(s) that are the same as other medications prescribed for you. Read the directions carefully, and ask your doctor or other care provider to  review them with you.

## 2018-04-05 NOTE — TELEPHONE ENCOUNTER
Pharmacy needed clarification on dispense   cyanocobalamin (VITAMIN B12) 1000 MCG/ML injection 0.9 mL 11 4/5/2018     Is it okay to dispense 1ml?  Please advise:-  Krysten Gonzales

## 2018-04-05 NOTE — PROGRESS NOTES
SUBJECTIVE:   Evelyn Vargas is a 67 year old female who presents to clinic today for the following health issues:    Asthma Follow-Up    Was ACT completed today?    Yes    ACT Total Scores 4/5/2018   ACT TOTAL SCORE -   ASTHMA ER VISITS -   ASTHMA HOSPITALIZATIONS -   ACT TOTAL SCORE (Goal Greater than or Equal to 20) 13   In the past 12 months, how many times did you visit the emergency room for your asthma without being admitted to the hospital? 0   In the past 12 months, how many times were you hospitalized overnight because of your asthma? 0       Recent asthma triggers that patient is dealing with: dust mites, pollens and cold air      Chronic Pain Follow-Up       Type / Location of Pain: Whole body, worse with taking breathes pain the whole left side body  Analgesia/pain control:       Recent changes:  worse      Overall control: Tolerable with discomfort  Activity level/function:      Daily activities:  Able to do light housework, cooking    Work:  not applicable  Adverse effects:  No  Adherance    Taking medication as directed?  Yes    Participating in other treatments: no  Risk Factors:    Sleep:  Good    Mood/anxiety:  controlled    Recent family or social stressors:  none noted -kids did move back to the house    Other aggravating factors: bending, movement/active  PHQ-9 SCORE 8/18/2016 2/6/2017 4/5/2018   Total Score - - -   Total Score 18 17 12     ALLIE-7 SCORE 1/7/2016 8/18/2016 2/6/2017   Total Score - - -   Total Score 14 17 14     Encounter-Level CSA:     There are no encounter-level csa.          Amount of exercise or physical activity: None    Problems taking medications regularly: No    Medication side effects: none    Diet: regular (no restrictions)        Hyperlipidemia Follow-Up      Rate your low fat/cholesterol diet?: good    Taking statin?  No    Other lipid medications/supplements?:  none    Hypertension Follow-up      Outpatient blood pressures are not being checked.    Low Salt Diet:  no added salt    Hypothyroidism Follow-up      Since last visit, patient describes the following symptoms: Weight stable, no hair loss, no skin changes, no constipation, no loose stools      Problem list and histories reviewed & adjusted, as indicated.  Additional history: as documented    Patient Active Problem List   Diagnosis     Chronic pain syndrome     Mild persistent asthma     Vitamin B12 deficiency disease     Hypothyroidism     Obesity     Hypertension, goal below 140/90     Advanced directives, counseling/discussion     Polycythemia     Thrombocytosis (H)     Myeloproliferative disorder (H)     Generalized anxiety disorder     Hypertriglyceridemia     Opiate or related narcotic overdose, accidental or unintentional, subsequent encounter     Persistent insomnia     Altered mental status     Benzodiazepine dependence (H)     Vision loss of right eye     Chronic kidney disease     Fibromyalgia     Opioid dependence (H)     Postoperative state     Chronic fatigue fibromyalgia syndrome     No past surgical history on file.    Social History   Substance Use Topics     Smoking status: Never Smoker     Smokeless tobacco: Never Used     Alcohol use No     Family History   Problem Relation Age of Onset     Family history unknown: Yes         Current Outpatient Prescriptions   Medication Sig Dispense Refill     amitriptyline (ELAVIL) 50 MG tablet TAKE ONE TABLET BY MOUTH AT BEDTIME. MAY TAKE ALONG WITH 100MG TABLET IF NEEDED 60 tablet 0     cyclobenzaprine (FLEXERIL) 10 MG tablet TAKE ONE TABLET BY MOUTH THREE TIMES A DAY AS NEEDED 90 tablet 1     traMADol (ULTRAM) 50 MG tablet Take 1 tablet (50 mg) by mouth every 8 hours as needed for moderate pain 90 tablet 1     cloNIDine (CATAPRES) 0.2 MG tablet TAKE ONE TABLET BY MOUTH THREE TIMES A DAY 90 tablet 10     amitriptyline (ELAVIL) 100 MG tablet TAKE ONE TABLET BY MOUTH AT BEDTIME 90 tablet 1     hydrOXYzine (ATARAX) 25 MG tablet Take 2 tablets (50 mg) by mouth  nightly as needed for other (sleep) 30 tablet 6     ferrous sulfate (IRON) 325 (65 FE) MG tablet Take 1 tablet (325 mg) by mouth 2 times daily 60 tablet 11     cyanocobalamin (VITAMIN B12) 1000 MCG/ML injection Inject 1 mL (1,000 mcg) into the muscle every 30 days 0.9 mL 11     Cholecalciferol (VITAMIN D) 2000 UNITS tablet Take 2,000 Units by mouth daily 100 tablet 3     gabapentin (NEURONTIN) 300 MG capsule Take 1 capsule (300 mg) by mouth 2 times daily Arthritis, non-child proof caps 180 capsule 3     losartan (COZAAR) 50 MG tablet Take 1 tablet (50 mg) by mouth daily 90 tablet 3     albuterol (PROAIR HFA, PROVENTIL HFA, VENTOLIN HFA) 108 (90 BASE) MCG/ACT inhaler Inhale 2 puffs into the lungs every 6 hours as needed for shortness of breath / dyspnea 1 Inhaler 11     albuterol (2.5 MG/3ML) 0.083% nebulizer solution Take 1 vial (2.5 mg) by nebulization every 6 hours as needed 360 mL 11     [DISCONTINUED] amitriptyline (ELAVIL) 100 MG tablet TAKE ONE TABLET BY MOUTH AT BEDTIME 90 tablet 1     [DISCONTINUED] amitriptyline (ELAVIL) 100 MG tablet TAKE 1 TABLET (100 MG) BY MOUTH AT BEDTIME 90 tablet 0     Allergies   Allergen Reactions     Acetaminophen      Other reaction(s): Vomiting     Morphine      Other reaction(s): GI Problems/ Nausea     Tylenol      Recent Labs   Lab Test  02/06/17   1105  08/18/16   1243  08/18/16   1222  01/07/16   1131   09/24/14   1650   05/27/14   1701  08/16/13   0820   A1C  5.3   --   5.6  5.3   --    --    --   5.8  5.7   LDL  116*   --    --    --    --    --    --   110  60   HDL  75   --    --    --    --    --    --   40*  28*   TRIG   --    --    --    --    --    --    --   196*  107   ALT  33   --   29   --    --   24   --   24  30   CR  0.99   --   0.70  0.73   < >  0.68   < >  1.10*  1.16*   GFRESTIMATED  56*   --   84  81   < >  87   < >  50*  47*   GFRESTBLACK  68   --   >90   GFR Calc    >90   GFR Calc     < >  >90   GFR Calc    "  < >  61  57*   POTASSIUM  4.3   --   3.9  3.8   < >  4.5   < >  3.6  4.1   TSH  3.85  1.66   --   0.40   --    --    --   0.68  2.27    < > = values in this interval not displayed.      BP Readings from Last 3 Encounters:   04/05/18 (!) 132/101   02/06/17 132/88   08/18/16 (!) 164/92    Wt Readings from Last 3 Encounters:   04/05/18 194 lb (88 kg)   02/06/17 189 lb (85.7 kg)   08/18/16 177 lb 6.4 oz (80.5 kg)                  Labs reviewed in EPIC    Reviewed and updated as needed this visit by clinical staff  Tobacco  Allergies  Meds       Reviewed and updated as needed this visit by Provider         ROS:  Constitutional, HEENT, cardiovascular, pulmonary, gi and gu systems are negative, except as otherwise noted.    OBJECTIVE:     BP (!) 132/101 (BP Location: Right arm, Patient Position: Chair, Cuff Size: Adult Regular)  Pulse 112  Temp 98.3  F (36.8  C) (Oral)  Ht 5' 4.25\" (1.632 m)  Wt 194 lb (88 kg)  SpO2 98%  BMI 33.04 kg/m2  Body mass index is 33.04 kg/(m^2).  GENERAL: healthy, alert, well nourished, well hydrated, no distress, obese  HENT: ear canals- normal; TMs- normal; Nose- normal; Mouth- no ulcers, no lesions, throat is clear with no erythema or exudate.   NECK: no tenderness, no adenopathy, no asymmetry, no masses, no stiffness; thyroid- normal to palpation  RESP: lungs clear to auscultation - no rales, no rhonchi, no wheezes  CV: regular rates and rhythm, normal S1 S2, no S3 or S4 and no murmur, no click or rub -  ABDOMEN: soft, no tenderness, liver edge palpable and is harder than expected with some tenderness, no masses, normal bowel sounds  MS: extremities- no gross deformities noted, no edema  SKIN: no suspicious lesions, no rashes  NEURO: strength and tone- normal, sensory exam- grossly normal, mentation- intact, speech- normal, reflexes- symmetric  BACK: no CVA tenderness, no paralumbar tenderness  PSYCH: Alert and oriented times 3; speech- coherent , normal rate and volume; able to " articulate logical thoughts, able to abstract reason, no tangential thoughts, no hallucinations or delusions, affect- normal     Diagnostic Test Results:  Results for orders placed or performed in visit on 04/05/18 (from the past 24 hour(s))   Lipid panel reflex to direct LDL Fasting   Result Value Ref Range    Cholesterol 170 <200 mg/dL    Triglycerides 151 (H) <150 mg/dL    HDL Cholesterol 64 >49 mg/dL    LDL Cholesterol Calculated 76 <100 mg/dL    Non HDL Cholesterol 106 <130 mg/dL   Comprehensive metabolic panel   Result Value Ref Range    Sodium 141 133 - 144 mmol/L    Potassium 3.7 3.4 - 5.3 mmol/L    Chloride 109 94 - 109 mmol/L    Carbon Dioxide 23 20 - 32 mmol/L    Anion Gap 9 3 - 14 mmol/L    Glucose 128 (H) 70 - 99 mg/dL    Urea Nitrogen 9 7 - 30 mg/dL    Creatinine 0.94 0.52 - 1.04 mg/dL    GFR Estimate 59 (L) >60 mL/min/1.7m2    GFR Estimate If Black 72 >60 mL/min/1.7m2    Calcium 9.3 8.5 - 10.1 mg/dL    Bilirubin Total 0.3 0.2 - 1.3 mg/dL    Albumin 3.7 3.4 - 5.0 g/dL    Protein Total 7.9 6.8 - 8.8 g/dL    Alkaline Phosphatase 108 40 - 150 U/L    ALT 50 0 - 50 U/L    AST 48 (H) 0 - 45 U/L   CBC with platelets   Result Value Ref Range    WBC 8.4 4.0 - 11.0 10e9/L    RBC Count 4.79 3.8 - 5.2 10e12/L    Hemoglobin 13.9 11.7 - 15.7 g/dL    Hematocrit 43.4 35.0 - 47.0 %    MCV 91 78 - 100 fl    MCH 29.0 26.5 - 33.0 pg    MCHC 32.0 31.5 - 36.5 g/dL    RDW 14.9 10.0 - 15.0 %    Platelet Count 332 150 - 450 10e9/L   Hemoglobin A1c   Result Value Ref Range    Hemoglobin A1C 6.6 (H) 0 - 6.4 %   TSH with free T4 reflex   Result Value Ref Range    TSH 2.80 0.40 - 4.00 mU/L     Results for orders placed or performed in visit on 02/06/17   CBC with platelets   Result Value Ref Range    WBC 9.9 4.0 - 11.0 10e9/L    RBC Count 5.04 3.8 - 5.2 10e12/L    Hemoglobin 14.7 11.7 - 15.7 g/dL    Hematocrit 46.8 35.0 - 47.0 %    MCV 93 78 - 100 fl    MCH 29.2 26.5 - 33.0 pg    MCHC 31.4 (L) 31.5 - 36.5 g/dL    RDW 13.4 10.0 -  "15.0 %    Platelet Count 334 150 - 450 10e9/L   Comprehensive metabolic panel   Result Value Ref Range    Sodium 141 133 - 144 mmol/L    Potassium 4.3 3.4 - 5.3 mmol/L    Chloride 105 94 - 109 mmol/L    Carbon Dioxide 25 20 - 32 mmol/L    Anion Gap 11 3 - 14 mmol/L    Glucose 113 (H) 70 - 99 mg/dL    Urea Nitrogen 12 7 - 30 mg/dL    Creatinine 0.99 0.52 - 1.04 mg/dL    GFR Estimate 56 (L) >60 mL/min/1.7m2    GFR Estimate If Black 68 >60 mL/min/1.7m2    Calcium 9.6 8.5 - 10.1 mg/dL    Bilirubin Total 0.3 0.2 - 1.3 mg/dL    Albumin 3.9 3.4 - 5.0 g/dL    Protein Total 7.8 6.8 - 8.8 g/dL    Alkaline Phosphatase 89 40 - 150 U/L    ALT 33 0 - 50 U/L    AST 28 0 - 45 U/L   TSH with free T4 reflex   Result Value Ref Range    TSH 3.85 0.40 - 4.00 mU/L   LDL cholesterol direct   Result Value Ref Range    LDL Cholesterol Direct 116 (H) <100 mg/dL   HDL cholesterol   Result Value Ref Range    HDL Cholesterol 75 >49 mg/dL   Hemoglobin A1c   Result Value Ref Range    Hemoglobin A1C 5.3 4.3 - 6.0 %   Cholesterol   Result Value Ref Range    Cholesterol 213 (H) <200 mg/dL   EKG 12-lead complete w/read - Clinics    Impression    Non-specific T-waves and old Q-waves. No acute changes.  Tere Camacho MD        ASSESSMENT/PLAN:         Tobacco Cessation:   reports that she has never smoked. She has never used smokeless tobacco.      BMI:   Estimated body mass index is 33.04 kg/(m^2) as calculated from the following:    Height as of this encounter: 5' 4.25\" (1.632 m).    Weight as of this encounter: 194 lb (88 kg).   Weight management plan: Discussed healthy diet and exercise guidelines and patient will follow up in 3 months in clinic to re-evaluate.        ICD-10-CM    1. Chronic pain syndrome G89.4 traMADol (ULTRAM) 50 MG tablet three times a day as needed. Avoiding other narcotics due to overdose in past but seems to be stable on this medication.    2. Type 2 diabetes mellitus with stage 3 chronic kidney disease, without long-term " current use of insulin (H) E11.22 Lab Results   Component Value Date    A1C 6.6 04/05/2018    A1C 5.3 02/06/2017    A1C 5.6 08/18/2016    A1C 5.3 01/07/2016    A1C 5.8 05/27/2014      recurrent diabetes after many years of remission following bypass surgery. Will need follow up in 3 months to confirm.    N18.3    3. Asymptomatic postmenopausal status Z78.0 DEXA HIP/PELVIS/SPINE - Future   4. Need for prophylactic vaccination against Streptococcus pneumoniae (pneumococcus) Z23 Pneumococcal vaccine 13 valent PCV13 IM (Prevnar) [05899]   5. Mild persistent asthma with acute exacerbation J45.31 Score seems low on ACT but exam stable    6. Hypothyroidism due to acquired atrophy of thyroid E03.4 TSH with free T4 reflex   7. Screen for colon cancer Z12.11 Fecal colorectal cancer screen FIT - Future (S+30)   8. Hypertension, goal below 140/90 I10 cloNIDine (CATAPRES) 0.2 MG tablet three times a day working better for patient than other medications      CBC with platelets   9. Chronic fatigue fibromyalgia syndrome R53.82 Comprehensive metabolic panel    M79.7    10. Mild persistent asthma without complication J45.30 albuterol (PROAIR HFA/PROVENTIL HFA/VENTOLIN HFA) 108 (90 BASE) MCG/ACT Inhaler     albuterol (2.5 MG/3ML) 0.083% neb solution   11. Persistent insomnia G47.00 amitriptyline (ELAVIL) 100 MG tablet     hydrOXYzine (ATARAX) 25 MG tablet   12. Recurrent major depressive disorder, in partial remission (H) F33.41 amitriptyline (ELAVIL) 50 MG tablet- combines with amitriptyline for sleep and pain control. Working very well.   13. Vitamin D deficiency E55.9 Cholecalciferol (VITAMIN D) 2000 UNITS tablet     Vitamin D Deficiency   14. Essential hypertension with goal blood pressure less than 140/90 I10 losartan (COZAAR) 50 MG tablet   15. Vitamin B12 deficiency (non anemic) E53.8 Cyanocobalamin (VITAMIN B12) 1000 MCG/ML injection refills   16. Back muscle spasm M62.830 cyclobenzaprine (FLEXERIL) 10 MG tablet   17. Other  iron deficiency anemia D50.8 ferrous sulfate (IRON) 325 (65 FE) MG tablet   18. Neuropathy G62.9 gabapentin (NEURONTIN) 300 MG capsule three times a day    19. Hepatomegaly R16.0 US Abdomen Complete   20. Elevated blood sugar R73.9 Hemoglobin A1c   21. Hypertriglyceridemia E78.1 Lipid panel reflex to direct LDL Fasting       FUTURE LABS:       - Schedule fasting labs in 3 months  FUTURE APPOINTMENTS:       - Follow-up visit in 3 months or sooner if any questions or concerns.   Work on weight loss  Regular exercise  See Patient Instructions    Tere Camacho MD  [facility]

## 2018-04-05 NOTE — LETTER
April 9, 2018      Evelyn Vargas  [address]   [place] [address]            Dear Evelyn Vargas    Your test results are attached. I am happy to let you know that they are stable and your medications can stay the same.     The kidney test is stable. Vitamin d level is improving and I think this will help with your pain and energy level. The thyroid test is normal.     The test for diabetes is a little higher and I would like to recheck it in 3 months. Watch the amount of carbohydrates- sugar and starch in your meals to make sure this is not too high. You may be developing early diabetes.     Please call me if you have any questions about these test results or about your care.      Enclosed is a copy of the results.  It was a pleasure to see you at your last appointment.    If you have any questions or concerns, please call myself or my nurse at [phone].      Sincerely,      Tere Camacho MD, MPH /ANA MARIA Nobles MA      Results for orders placed or performed in visit on 04/05/18   Lipid panel reflex to direct LDL Fasting   Result Value Ref Range    Cholesterol 170 <200 mg/dL    Triglycerides 151 (H) <150 mg/dL    HDL Cholesterol 64 >49 mg/dL    LDL Cholesterol Calculated 76 <100 mg/dL    Non HDL Cholesterol 106 <130 mg/dL   Comprehensive metabolic panel   Result Value Ref Range    Sodium 141 133 - 144 mmol/L    Potassium 3.7 3.4 - 5.3 mmol/L    Chloride 109 94 - 109 mmol/L    Carbon Dioxide 23 20 - 32 mmol/L    Anion Gap 9 3 - 14 mmol/L    Glucose 128 (H) 70 - 99 mg/dL    Urea Nitrogen 9 7 - 30 mg/dL    Creatinine 0.94 0.52 - 1.04 mg/dL    GFR Estimate 59 (L) >60 mL/min/1.7m2    GFR Estimate If Black 72 >60 mL/min/1.7m2    Calcium 9.3 8.5 - 10.1 mg/dL    Bilirubin Total 0.3 0.2 - 1.3 mg/dL    Albumin 3.7 3.4 - 5.0 g/dL    Protein Total 7.9 6.8 - 8.8 g/dL    Alkaline Phosphatase 108 40 - 150 U/L    ALT 50 0 - 50 U/L    AST 48 (H) 0 - 45 U/L   CBC with platelets   Result Value Ref Range    WBC 8.4 4.0 -  11.0 10e9/L    RBC Count 4.79 3.8 - 5.2 10e12/L    Hemoglobin 13.9 11.7 - 15.7 g/dL    Hematocrit 43.4 35.0 - 47.0 %    MCV 91 78 - 100 fl    MCH 29.0 26.5 - 33.0 pg    MCHC 32.0 31.5 - 36.5 g/dL    RDW 14.9 10.0 - 15.0 %    Platelet Count 332 150 - 450 10e9/L   Hemoglobin A1c   Result Value Ref Range    Hemoglobin A1C 6.6 (H) 0 - 6.4 %   TSH with free T4 reflex   Result Value Ref Range    TSH 2.80 0.40 - 4.00 mU/L   Vitamin D Deficiency   Result Value Ref Range    Vitamin D Deficiency screening 23 20 - 75 ug/L

## 2018-04-06 LAB — DEPRECATED CALCIDIOL+CALCIFEROL SERPL-MC: 23 UG/L (ref 20–75)

## 2018-04-06 ASSESSMENT — ASTHMA QUESTIONNAIRES: ACT_TOTALSCORE: 13

## 2018-04-06 ASSESSMENT — PATIENT HEALTH QUESTIONNAIRE - PHQ9: SUM OF ALL RESPONSES TO PHQ QUESTIONS 1-9: 12

## 2018-04-07 NOTE — PROGRESS NOTES
Dear Evelyn Vargas,    Your test results are attached. I am happy to let you know that they are stable and your medications can stay the same.    The kidney test is stable. Vitamin d level is improving and I think this will help with your pain and energy level. The thyroid test is normal.     The test for diabetes is a little higher and I would like to recheck it in 3 months. Watch the amount of carbohydrates- sugar and starch in your meals to make sure this is not too high. You may be developing early diabetes.     Please call me if you have any questions about these test results or about your care.    Sincerely,    Tere Camacho MD

## 2018-04-13 ENCOUNTER — TELEPHONE (OUTPATIENT)
Dept: FAMILY MEDICINE | Facility: CLINIC | Age: 68
End: 2018-04-13

## 2018-04-13 DIAGNOSIS — M62.830 MUSCLE SPASM OF BACK: ICD-10-CM

## 2018-04-13 DIAGNOSIS — M62.830 BACK MUSCLE SPASM: ICD-10-CM

## 2018-04-13 RX ORDER — CYCLOBENZAPRINE HCL 10 MG
10 TABLET ORAL 2 TIMES DAILY PRN
Qty: 90 TABLET | Refills: 3 | Status: SHIPPED | OUTPATIENT
Start: 2018-04-13 | End: 2019-04-30

## 2018-04-13 NOTE — TELEPHONE ENCOUNTER
Patient states at the last visit Dr Camacho mentioned would also give patient Rx Tizanidine along the Cyclobenzaprine. Tizanidine to take prn when need something strong instead of Cyclobenzaprine. Patient told will check with Dr Camacho and get back.    Please advise.  Giuliano Sapp CMA

## 2018-04-13 NOTE — TELEPHONE ENCOUNTER
Reason for Call:  Medication or medication refill:    Do you use a [facility]?  Name of the pharmacy and phone number for the current request:  [facility], [address] [place] [address] . Ph.  [phone]    Name of the medication requested: cyclobenzaprine (FLEXERIL) 10 MG tablet    Can we leave a detailed message on this number? YES    Phone number patient can be reached at: Home number on file [phone] (home)    Best Time: anytime    Call taken on 4/13/2018 at 10:26 AM by Severino Zhang

## 2018-04-13 NOTE — TELEPHONE ENCOUNTER
This writer attempted to contact Evelyn on 04/13/18      Reason for call informed Rx sent to pharmacy on 4/5/18 and left detailed message.      If patient calls back:   Relay message, (read verbatim), document that pt called and close encounter        Tl Cruz MA

## 2018-04-13 NOTE — TELEPHONE ENCOUNTER
Reason for Call:  Other prescription    Detailed comments: Pt calling to follow up on medication request and would like to see if Dr. Camacho will send in Rx     Phone Number Patient can be reached at: Home number on file [phone] (home)    Best Time: anytime    Can we leave a detailed message on this number? YES    Call taken on 4/13/2018 at 10:28 AM by Severino Zhang

## 2018-04-13 NOTE — TELEPHONE ENCOUNTER
Routing refill request to provider for review/approval because:  Drug not on the [facility] refill protocol   Cristina Velasquez RN

## 2018-04-13 NOTE — TELEPHONE ENCOUNTER
returned call    Best number to reach caller: Home number on file [phone] (home)    Is it ok to leave a detailed message: YES

## 2018-04-13 NOTE — TELEPHONE ENCOUNTER
Patient contacted and informed medication filled. She states that insurance will on ly allow #60 at a time. Patient told that she can just  what insurance allows at a time for now by letting pharmacy know to only dispense #60 then just refill prn since refills are available when out. Patient is okay with plan.    Giuliano Sapp CMA

## 2018-04-20 ENCOUNTER — TELEPHONE (OUTPATIENT)
Dept: FAMILY MEDICINE | Facility: CLINIC | Age: 68
End: 2018-04-20

## 2018-04-20 DIAGNOSIS — E53.8 VITAMIN B12 DEFICIENCY (NON ANEMIC): ICD-10-CM

## 2018-04-20 RX ORDER — CYANOCOBALAMIN 1000 UG/ML
1000 INJECTION, SOLUTION INTRAMUSCULAR; SUBCUTANEOUS
Qty: 1 ML | Refills: 11 | Status: SHIPPED | OUTPATIENT
Start: 2018-04-20 | End: 2019-10-17

## 2018-04-20 NOTE — TELEPHONE ENCOUNTER
Chart review shows that prescription was sent to [facility] in [place] on 4/5/18 by Dr. Camacho. Called and spoke with pharmacy. Pharmacy states they never got prescription refill and could not locate in system or pt file. RN re-sent same prescription ordered by Dr. Camacho to pharmacy today. Called and notified pt of this and that refill was resent today.    Alice Hernandez RN  [facility] Triage

## 2018-04-20 NOTE — TELEPHONE ENCOUNTER
Reason for Call:  Other prescription    Detailed comments: patient did not received the B-12 from her pharmacy and asking to get that ordered.    [facility], [place] [address] [place] - [address] [place] [address]    Phone Number Patient can be reached at: Home number on file [phone] (home)    Best Time: any    Can we leave a detailed message on this number? YES    Call taken on 4/20/2018 at 3:47 PM by Leydi Medrano

## 2018-04-23 ENCOUNTER — RADIANT APPOINTMENT (OUTPATIENT)
Dept: ULTRASOUND IMAGING | Facility: CLINIC | Age: 68
End: 2018-04-23
Attending: FAMILY MEDICINE
Payer: MEDICARE

## 2018-04-23 ENCOUNTER — RADIANT APPOINTMENT (OUTPATIENT)
Dept: BONE DENSITY | Facility: CLINIC | Age: 68
End: 2018-04-23
Attending: FAMILY MEDICINE
Payer: MEDICARE

## 2018-04-23 DIAGNOSIS — Z78.0 ASYMPTOMATIC POSTMENOPAUSAL STATUS: ICD-10-CM

## 2018-04-23 DIAGNOSIS — R16.0 HEPATOMEGALY: ICD-10-CM

## 2018-04-23 PROCEDURE — 77085 DXA BONE DENSITY AXL VRT FX: CPT | Performed by: INTERNAL MEDICINE

## 2018-04-23 PROCEDURE — 76700 US EXAM ABDOM COMPLETE: CPT

## 2018-04-23 NOTE — LETTER
April 24, 2018      Evelyn Vargas  [address] [place] [address]        Dear Evelyn Vargas    Your test results are attached. I am happy to let you know that they are stable and your medications can stay the same.     The ultrasound shows a fatty liver and this is best treated with a low carbohydrate and heart healthy diet. This is also good for diabetes.      It was a pleasure to see you at your last appointment.    If you have any questions or concerns, please call myself or my nurse at [phone].      Sincerely,      Tere Camacho MD, MPH /ELIO Spain MA      Results for orders placed or performed in visit on 04/23/18   US Abdomen Complete    Narrative    ULTRASOUND ABDOMEN COMPLETE  4/23/2018 10:20 AM    HISTORY: Hepatomegaly.    COMPARISON: None.    FINDINGS: The liver appears prominent in size, measuring 20.2 cm in  length. There is diffuse fatty infiltration of the liver. No focal  hepatic lesions are identified. The gallbladder is not seen and may be  surgically absent. No intra- or extrahepatic bile duct dilatation. The  common duct could not be visualized in its entirety. The pancreas is  obscured by overlying bowel gas and could not be evaluated. Small  probable cyst in the lower pole of the left kidney measures 1.1 cm.  The kidneys are otherwise unremarkable. No hydronephrosis. Abdominal  aorta and IVC are segmentally seen, and are of normal caliber where  visualized.      Impression    IMPRESSION:   1. There is diffuse fatty infiltration of the liver, and the liver  appears prominent in size.  2. Small probable left renal cyst.  3. Nonvisualization of the pancreas.  4. The gallbladder is not seen and may be surgically absent.     BETTIE GASTON MD

## 2018-04-23 NOTE — LETTER
2018      Evelynfoster Vargas  [address] [place] [address]        Dear Evelyn Vargas    Your test results are attached.     The DEXA scan shows decreased bone density in your hips and you are at increased risk for fracture if you fall. We should discuss the treatment options for this including medication.     Please call me if you have any questions about these test results or about your care.        Sincerely,      Tere Camacho MD, MPH /ELIO Spain MA      Results for orders placed or performed in visit on 18   DX Hip/Pelvis/Spine w Lat Fraction Layla    Narrative    BONE DENSITOMETRY  [address] [place] [address]  2018      PATIENT: Evelyn Vargas  CHART: [id number]   :  1950  AGE:  67 year old  SEX:  female   REFERRING PROVIDER:  Tere Camacho MD     PROCEDURE:  Bone density scanning was performed using DXA technology of   the lumbar spine and hip.  Scanning was performed on a Lunar Prodigy   scanner.  Reporting is completed in the form of a T-score.  The T-score   represents the standard deviation from peak bone mass based on a young   healthy adult.     REFERENCE T-SCORES:       Normal                -1.0 and greater                                 Osteopenia         Between -1.0 and -2.5                                             Osteoporosis     -2.5 and less                                       RISK FACTORS:  Post-menopausal, Height loss of 2.5 inches, Fractures of   ribs and leg, Condition related to bone loss: gastric bypass    CURRENT TREATMENT:  Vitamin D     FINDINGS:               Lumbar Spine (L1-L4) T-score:  -1.4               Left Femoral Neck  T-score:  -3.2               Right Femoral Neck T-score:  -3.6                              Lumbar (L1-L4) BMD: 1.009                                                             Total Hip Mean BMD: 0.605        LATERAL VERTEBRAL ASSESSMENT  Procedure:  Vertebral fracture  "assessment was performed in the lateral   decubitus position using a Lunar Prodigy  densitometer.  Indications for VFA: T-score of -1.0 or worse and height loss > 1.5\"  Confounding factors for VFA: None.  The LVA scan is interpretable from T3   to L5.    VFA Findings: Using the semi-quantitative analysis of [employer] there was   evidence of no spinal deformity  VFA Impression: Evelyn Vargas has no vertebral fractures identified on   the VFA.       IMPRESSION  Osteoporosis.    Recommendations include ensuring adequate Calcium and Vitamin D.    The current NOF Guidelines recommend treatment for patients with prior hip   or vertebral fracture, T-score -2.5 or below, or 10 year risk of any major   osteoporotic fracture >20% or 10 year risk of hip fracture >3%, as   calculated using the FRAX calculator (www.shef.ac.uk/FRAX or you can   google \"FRAX\").      Based on these guidelines, treatment (in addition to calcium and vitamin   D) is recommended for this patient, after ruling out other causes of   osteoporosis.  This is meant as an aid to clinical decision making; one must still use   clinical judgement.      Follow up can be considered in 2 years.   ___________________  Sommer Machado M.D.  Electronically signed                                 "

## 2018-04-24 ENCOUNTER — TELEPHONE (OUTPATIENT)
Dept: FAMILY MEDICINE | Facility: CLINIC | Age: 68
End: 2018-04-24

## 2018-04-24 NOTE — TELEPHONE ENCOUNTER
Please Advise: Prior Authorization request.    Medications: cyanocobalamin (VITAMIN B12) 1000 MCG/ML injection  Host: [employer]  Key: [id number]   From: [address] [place] [address]  Ph.[phone]  Fx.[phone]  Krysten Gonzales

## 2018-04-24 NOTE — PROGRESS NOTES
Dear Evelyn Vargas,    Your test results are attached. I am happy to let you know that they are stable and your medications can stay the same.    The ultrasound shows a fatty liver and this is best treated with a low carbohydrate and heart healthy diet. This is also good for diabetes.     Please call me if you have any questions about these test results or about your care.    Sincerely,    Tere Camacho MD

## 2018-04-24 NOTE — PROGRESS NOTES
Dear Evelyn Vargas,    Your test results are attached.     The DEXA scan shows decreased bone density in your hips and you are at increased risk for fracture if you fall. We should discuss the treatment options for this including medication.     Please call me if you have any questions about these test results or about your care.    Sincerely,    Tere Camacho MD

## 2018-04-24 NOTE — LETTER
[address] [address]  [phone]        May 2, 2018    Regarding:  Evelyn Vargas  [address] [place] [address]              To Whom It May Concern;    Evelyn Vargas is on Vitamin B 12 shots for pernicious anemia. This is a medical necessity and needs to be continued as a medication and not a supplement. She has had a gastric bypass and will need this the rest of her life.           Sincerely,        Tere Camacho MD

## 2018-04-26 NOTE — TELEPHONE ENCOUNTER
Central Prior Authorization Team   Phone: [phone]      PA Initiation    Medication: cyanocobalamin  Insurance Company: [employer] - Phone [phone] Fax [phone]  Pharmacy Filling the Rx: [facility] KATHIE SHERWOOD  [facility] [place] [facility] [address] [place] [address]  Filling Pharmacy Phone: [phone]  Filling Pharmacy Fax: [phone]  Start Date: 4/26/2018

## 2018-04-27 NOTE — TELEPHONE ENCOUNTER
PRIOR AUTHORIZATION DENIED    Medication: cyanocobalamin - Denied    Denial Date: 4/26/2018    Denial Rational:  Plan does not offer supplemental benefits.     Appeal Information:

## 2018-04-30 NOTE — TELEPHONE ENCOUNTER
Hi - even though it is not being prescribed as a supplement it is in the insurance companies drug list as a supplement. We can try to appeal but would need a LMN from the provider.     Thanks

## 2018-04-30 NOTE — TELEPHONE ENCOUNTER
Please check to see what is going on with this PA. B-12 is a medical treatment for B-12 deficiency and is not being prescribed as a supplement. Should be a medication for patients with pernicious anemia. This does not make any sense.  Tere Camacho MD    no

## 2018-05-01 NOTE — TELEPHONE ENCOUNTER
Please print form or what ever I need for this letter of medical necessity that apparently is needed for a medication that patient is taking for her pernicious anemia. I still do not know what this is.  Tere Camacho MD

## 2018-05-03 NOTE — TELEPHONE ENCOUNTER
Medication Appeal Initiation    We have initiated an appeal for the requested medication:  Medication:   Appeal Start Date:  5/3/2018  Insurance Company:    Comments:

## 2018-05-04 NOTE — TELEPHONE ENCOUNTER
MEDICATION APPEAL DENIED    Medication: Cyanocobalamin    Denial Date: 5/4/2018    Denial Rational: It's an excluded drug because It is a vitamin or mineral.     Second Level Appeal Information:      Second level appeals will be managed by the clinic staff and provider. Please contact the [employer] Prior Authorization Team if additional information about the denial is needed.

## 2018-05-07 NOTE — TELEPHONE ENCOUNTER
Please find out why this is being refused for a patient who has pernicious anemia. I find this exceedingly hard to believe. I have not had it refused or other elderly patients.   Tere Camacho MD

## 2018-05-08 NOTE — TELEPHONE ENCOUNTER
Please contact Medicare appeal department [phone] need to resummit the appeal.    [facility] review board make [phone] fax [phone]. They are an independent reviewer for all denial appeals.

## 2018-05-09 NOTE — TELEPHONE ENCOUNTER
Medication Appeal Initiation    We have initiated an appeal for the requested medication:  Medication: Cyanocobalamin  Appeal Start Date:  5/3/2018  Insurance Company: [employer] - Phone [phone] Fax [phone]  Comments:  Faxed in appeal to [facility]

## 2018-05-10 NOTE — TELEPHONE ENCOUNTER
MEDICATION APPEAL DENIED    Medication: Cyanocobalamin    Denial Date: 5/10/2018    Denial Rational:  Medicare law excludes certain drugs from coverage. Cyanocobalamin is one of them.       Second Level Appeal Information:     Second level appeals will be managed by the clinic staff and provider. Please contact the [employer] Prior Authorization Team if additional information about the denial is needed.

## 2018-05-14 NOTE — TELEPHONE ENCOUNTER
Please find out why medication is not covered for pernicious anemia. Should not require a PA.  Tere Camacho MD

## 2018-05-15 NOTE — TELEPHONE ENCOUNTER
Medicare does not cover vitamins or mineral products regardless of what dx they are being prescribed for.  Patient will have to purchase out of pocket.

## 2018-06-07 DIAGNOSIS — M62.830 MUSCLE SPASM OF BACK: ICD-10-CM

## 2018-06-07 NOTE — TELEPHONE ENCOUNTER
Requested Prescriptions   Pending Prescriptions Disp Refills     tiZANidine (ZANAFLEX) 4 MG tablet 60 tablet 6     Sig: Take 1 tablet (4 mg) by mouth 3 times daily as needed for muscle spasms    There is no refill protocol information for this order        This was in refill errors; we have patient's last name as Alicia and pharmacy has her last name as Adrienne.    Routing refill request to provider for review/approval because:  Drug not on the [facility] refill protocol     Fredy Jean-Baptiste RN, BSN

## 2018-06-11 DIAGNOSIS — G89.4 CHRONIC PAIN SYNDROME: ICD-10-CM

## 2018-06-11 RX ORDER — TRAMADOL HYDROCHLORIDE 50 MG/1
50 TABLET ORAL EVERY 8 HOURS PRN
Qty: 90 TABLET | Refills: 1 | Status: SHIPPED | OUTPATIENT
Start: 2018-06-11 | End: 2018-10-04

## 2018-06-11 NOTE — TELEPHONE ENCOUNTER
Requested Prescriptions   Pending Prescriptions Disp Refills     traMADol (ULTRAM) 50 MG tablet  Last Written Prescription Date:  04/05/18  Last Fill Quantity: 90,  # refills: 1   Last Office Visit with [facility], [facility] or [facility] prescribing provider:  /04/05/18   Future Office Visit:    90 tablet 1     Sig: Take 1 tablet (50 mg) by mouth every 8 hours as needed for moderate pain    There is no refill protocol information for this order

## 2018-09-10 ENCOUNTER — TELEPHONE (OUTPATIENT)
Dept: FAMILY MEDICINE | Facility: CLINIC | Age: 68
End: 2018-09-10

## 2018-09-10 NOTE — TELEPHONE ENCOUNTER
Panel Management Review      BP Readings from Last 1 Encounters:   04/05/18 (!) 132/101    ,   Lab Results   Component Value Date    A1C 6.6 04/05/2018    A1C 5.3 02/06/2017   , 9/10/2018  Last Office Visit with this department: 9/10/2018    Fail List measure:     Depression / Dysthymia review    Measure:  Needs PHQ-9 score of 4 or less during index window.  Administer PHQ-9 and if score is 5 or more, send encounter to provider for next steps.    5 - 7 month window range: 8/5/18 - 12/5/18    PHQ-9 SCORE 8/18/2016 2/6/2017 4/5/2018   Total Score - - -   Total Score 18 17 12       If PHQ-9 recheck is 5 or more, route to provider for next steps.    Patient is due for:  PHQ9      Patient is due/failing the following:   PHQ9    Action needed:   Patient needs office visit for 3 month follow up. and Patient needs to do PHQ9.    Type of outreach:    Postponed, will call patient tomorrow    Questions for provider review:    None                                                                                                                                    Giuliano Sapp      Chart routed to  .

## 2018-09-24 NOTE — TELEPHONE ENCOUNTER
Called and appointment set up for next Thursday Oct 4 with Dr Camacho for medication check and update PHQ9  Giuliano Sapp CMA

## 2018-10-04 ENCOUNTER — OFFICE VISIT (OUTPATIENT)
Dept: FAMILY MEDICINE | Facility: CLINIC | Age: 68
End: 2018-10-04
Payer: MEDICARE

## 2018-10-04 VITALS
DIASTOLIC BLOOD PRESSURE: 88 MMHG | OXYGEN SATURATION: 97 % | BODY MASS INDEX: 33.89 KG/M2 | TEMPERATURE: 98.3 F | SYSTOLIC BLOOD PRESSURE: 130 MMHG | WEIGHT: 199 LBS | HEART RATE: 112 BPM

## 2018-10-04 DIAGNOSIS — Z23 NEED FOR PROPHYLACTIC VACCINATION AND INOCULATION AGAINST INFLUENZA: ICD-10-CM

## 2018-10-04 DIAGNOSIS — G47.00 PERSISTENT INSOMNIA: ICD-10-CM

## 2018-10-04 DIAGNOSIS — I10 HYPERTENSION, GOAL BELOW 140/90: ICD-10-CM

## 2018-10-04 DIAGNOSIS — Z23 NEED FOR PROPHYLACTIC VACCINATION AGAINST STREPTOCOCCUS PNEUMONIAE (PNEUMOCOCCUS): ICD-10-CM

## 2018-10-04 DIAGNOSIS — G93.32 CHRONIC FATIGUE FIBROMYALGIA SYNDROME: ICD-10-CM

## 2018-10-04 DIAGNOSIS — Z12.31 VISIT FOR SCREENING MAMMOGRAM: ICD-10-CM

## 2018-10-04 DIAGNOSIS — G89.4 CHRONIC PAIN SYNDROME: ICD-10-CM

## 2018-10-04 DIAGNOSIS — R01.1 SYSTOLIC EJECTION MURMUR: ICD-10-CM

## 2018-10-04 DIAGNOSIS — E03.4 HYPOTHYROIDISM DUE TO ACQUIRED ATROPHY OF THYROID: ICD-10-CM

## 2018-10-04 DIAGNOSIS — N18.30 CKD (CHRONIC KIDNEY DISEASE) STAGE 3, GFR 30-59 ML/MIN (H): ICD-10-CM

## 2018-10-04 DIAGNOSIS — Z13.89 SCREENING FOR DIABETIC PERIPHERAL NEUROPATHY: ICD-10-CM

## 2018-10-04 DIAGNOSIS — E11.9 TYPE 2 DIABETES MELLITUS WITHOUT COMPLICATION, WITHOUT LONG-TERM CURRENT USE OF INSULIN (H): ICD-10-CM

## 2018-10-04 DIAGNOSIS — M79.7 CHRONIC FATIGUE FIBROMYALGIA SYNDROME: ICD-10-CM

## 2018-10-04 DIAGNOSIS — Z12.11 SCREEN FOR COLON CANCER: ICD-10-CM

## 2018-10-04 DIAGNOSIS — J45.31 MILD PERSISTENT ASTHMA WITH ACUTE EXACERBATION: Primary | ICD-10-CM

## 2018-10-04 DIAGNOSIS — M62.830 MUSCLE SPASM OF BACK: ICD-10-CM

## 2018-10-04 LAB
ALBUMIN SERPL-MCNC: 3.8 G/DL (ref 3.4–5)
ANION GAP SERPL CALCULATED.3IONS-SCNC: 10 MMOL/L (ref 3–14)
BUN SERPL-MCNC: 15 MG/DL (ref 7–30)
CALCIUM SERPL-MCNC: 8.9 MG/DL (ref 8.5–10.1)
CHLORIDE SERPL-SCNC: 108 MMOL/L (ref 94–109)
CO2 SERPL-SCNC: 24 MMOL/L (ref 20–32)
CREAT SERPL-MCNC: 0.84 MG/DL (ref 0.52–1.04)
ERYTHROCYTE [DISTWIDTH] IN BLOOD BY AUTOMATED COUNT: 14.6 % (ref 10–15)
GFR SERPL CREATININE-BSD FRML MDRD: 68 ML/MIN/1.7M2
GLUCOSE SERPL-MCNC: 118 MG/DL (ref 70–99)
HBA1C MFR BLD: 6.6 % (ref 0–5.6)
HCT VFR BLD AUTO: 44.1 % (ref 35–47)
HGB BLD-MCNC: 14.3 G/DL (ref 11.7–15.7)
MCH RBC QN AUTO: 29.7 PG (ref 26.5–33)
MCHC RBC AUTO-ENTMCNC: 32.4 G/DL (ref 31.5–36.5)
MCV RBC AUTO: 92 FL (ref 78–100)
PHOSPHATE SERPL-MCNC: 2.9 MG/DL (ref 2.5–4.5)
PLATELET # BLD AUTO: 333 10E9/L (ref 150–450)
POTASSIUM SERPL-SCNC: 3.9 MMOL/L (ref 3.4–5.3)
RBC # BLD AUTO: 4.81 10E12/L (ref 3.8–5.2)
SODIUM SERPL-SCNC: 142 MMOL/L (ref 133–144)
TSH SERPL DL<=0.005 MIU/L-ACNC: 3.6 MU/L (ref 0.4–4)
WBC # BLD AUTO: 11.4 10E9/L (ref 4–11)

## 2018-10-04 PROCEDURE — 85027 COMPLETE CBC AUTOMATED: CPT | Performed by: FAMILY MEDICINE

## 2018-10-04 PROCEDURE — 84443 ASSAY THYROID STIM HORMONE: CPT | Performed by: FAMILY MEDICINE

## 2018-10-04 PROCEDURE — 90662 IIV NO PRSV INCREASED AG IM: CPT | Performed by: FAMILY MEDICINE

## 2018-10-04 PROCEDURE — G0008 ADMIN INFLUENZA VIRUS VAC: HCPCS | Performed by: FAMILY MEDICINE

## 2018-10-04 PROCEDURE — 83036 HEMOGLOBIN GLYCOSYLATED A1C: CPT | Performed by: FAMILY MEDICINE

## 2018-10-04 PROCEDURE — 80069 RENAL FUNCTION PANEL: CPT | Performed by: FAMILY MEDICINE

## 2018-10-04 PROCEDURE — 99214 OFFICE O/P EST MOD 30 MIN: CPT | Mod: 25 | Performed by: FAMILY MEDICINE

## 2018-10-04 PROCEDURE — 82306 VITAMIN D 25 HYDROXY: CPT | Performed by: FAMILY MEDICINE

## 2018-10-04 PROCEDURE — 36415 COLL VENOUS BLD VENIPUNCTURE: CPT | Performed by: FAMILY MEDICINE

## 2018-10-04 RX ORDER — HYDROXYZINE HYDROCHLORIDE 25 MG/1
50 TABLET, FILM COATED ORAL
Qty: 60 TABLET | Refills: 6 | Status: SHIPPED | OUTPATIENT
Start: 2018-10-04 | End: 2021-06-25

## 2018-10-04 RX ORDER — TRAMADOL HYDROCHLORIDE 50 MG/1
50 TABLET ORAL EVERY 8 HOURS PRN
Qty: 90 TABLET | Refills: 1 | Status: SHIPPED | OUTPATIENT
Start: 2018-10-04 | End: 2018-11-27

## 2018-10-04 ASSESSMENT — ANXIETY QUESTIONNAIRES
GAD7 TOTAL SCORE: 1
1. FEELING NERVOUS, ANXIOUS, OR ON EDGE: SEVERAL DAYS
3. WORRYING TOO MUCH ABOUT DIFFERENT THINGS: NOT AT ALL
6. BECOMING EASILY ANNOYED OR IRRITABLE: NOT AT ALL
5. BEING SO RESTLESS THAT IT IS HARD TO SIT STILL: NOT AT ALL
IF YOU CHECKED OFF ANY PROBLEMS ON THIS QUESTIONNAIRE, HOW DIFFICULT HAVE THESE PROBLEMS MADE IT FOR YOU TO DO YOUR WORK, TAKE CARE OF THINGS AT HOME, OR GET ALONG WITH OTHER PEOPLE: NOT DIFFICULT AT ALL
7. FEELING AFRAID AS IF SOMETHING AWFUL MIGHT HAPPEN: NOT AT ALL
2. NOT BEING ABLE TO STOP OR CONTROL WORRYING: NOT AT ALL

## 2018-10-04 ASSESSMENT — PAIN SCALES - GENERAL: PAINLEVEL: NO PAIN (0)

## 2018-10-04 ASSESSMENT — PATIENT HEALTH QUESTIONNAIRE - PHQ9: 5. POOR APPETITE OR OVEREATING: NOT AT ALL

## 2018-10-04 NOTE — PATIENT INSTRUCTIONS
At [facility], we strive to deliver an exceptional experience to you, every time we see you.  If you receive a survey in the mail, please send us back your thoughts. We really do value your feedback.    Your care team:                            Family Medicine Internal Medicine   MD Anthony Ojeda MD Shantel Branch-Fleming, MD Katya Georgiev PA-C Megan Hill, APRN CNP    Clem Schultz MD Pediatrics   Armando Basurto, ELIOT Mendez, MD Mey Duarte APRN CNP   MD Jolly Pascual MD Deborah Mielke, MD Jacinta Rizzo, APRN [facility]      Clinic hours: Monday - Thursday 7 am-7 pm; Fridays 7 am-5 pm.   Urgent care: Monday - Friday 11 am-9 pm; Saturday and Sunday 9 am-5 pm.  Pharmacy : Monday -Thursday 8 am-8 pm; Friday 8 am-6 pm; Saturday and Sunday 9 am-5 pm.     Clinic: [phone]   Pharmacy: [phone]        Fibromyalgia  Fibromyalgia is a chronic condition. It causes pain and tenderness in connective tissues. This causes muscle pain. Often, there are also many tender areas throughout the body. Symptoms may also include stiffness and feelings of numbness and tingling. Symptoms may be worse upon waking up. They may increase with poor sleep, heavy activity, cold or damp weather, anxiety, or stress.  People with fibromyalgia often feel tired. They may have trouble sleeping. Other symptoms include morning stiffness, headaches, and painful menstrual periods. Some people have problems with thinking clearly and changes in memory.  The cause of fibromyalgia is not known. Symptoms are similar to that of other diseases. These include rheumatoid arthritis, low thyroid, chronic fatigue syndrome, and Lyme disease. In some cases, these diseases may occur together.  Fibromyalgia is often treated with medicines. You and your healthcare provider can discuss the medicine that may work best for you. You may have to try more than one medicine or combination  of medicines before you find what works for you.  Home care    If your healthcare provider has prescribed or recommended medicines, take them as directed.    Rest as needed. Try to get enough sleep. If you have trouble sleeping, discuss this with your healthcare provider.     Be active. Regular exercise can help manage symptoms. Some options include walking, swimming, and biking. Strengthening exercises may also be helpful. Talk to your healthcare provider about the best ways to be active.    Follow a healthy diet. Limit caffeine and alcohol. If you smoke, ask your healthcare provider for help to stop.    Notice how your body reacts to stress. Learn to listen to your body signals. This will help you take action before the stress becomes severe.    Learn relaxation techniques. Also consider joining a stress reduction program or class.    Talk to your healthcare provider about trying complementary treatments. These include acupuncture, hypnosis, and biofeedback. Yoga and jame chi may be helpful.    Ask your healthcare provider about cognitive behavioral therapy (CBT). This type of counseling can help people with fibromyalgia cope better with their illness.  Follow-up care  Follow up with your healthcare provider or as advised by our staff. In many cases, fibromyalgia is best treated with a team approach.  This may involve your primary care provider, a rheumatologist, a physical therapist, and a mental health professional.   For more information: National [place] of Arthritis and Musculoskeletal and Skin Diseases (NIAMS)  www.niams.nih.gov 020-994-8859  When to seek medical advice  Contact your healthcare provider right away if any of these occur:    Symptoms getting worse or new symptoms developing    You feel hopeless, helpless, or lose interest in day-to-day life  Date Last Reviewed: 3/1/2017    5658-0380 [employer]. [address] [place] [address], [place] [address]. All rights reserved. This information is  not intended as a substitute for professional medical care. Always follow your healthcare professional's instructions.        Understanding a Heart Murmur    The heart makes sounds as it beats. These sounds occur as the heart valves open and close to allow blood to flow through the heart. A heart murmur is an extra noise heard during a heartbeat. The noise is caused when blood does not flow smoothly through the heart. Heart murmurs can be innocent (harmless) or abnormal (caused by a heart problem).  What causes a heart murmur?  An innocent heart murmur can be a normal finding for many people. It may also be caused by:    Fever    Exercise    Pregnancy    Anemia    Overactive thyroid gland  An abnormal heart murmur can be caused by heart problems such as:    A damaged or diseased valve. The valve may be too narrow for blood to flow through easily. Or it may have problems opening or closing, and may leak blood backward.    A hole in the heart (septal defect). This is a problem with the heart s structure that a person is born with (congenital). It causes blood to leak through the wall that normally divides the left and right sides of the heart.  What are the symptoms of a heart murmur?  Heart murmurs do not usually cause symptoms. They tend to be found when your healthcare provider is listening to your heart for another reason. People with an abnormal heart murmur may have symptoms of the problem causing the murmur. Symptoms can include:    Feeling weak or tired    Shortness of breath, especially with exercise    Chest pain    Fast, pounding, or skipping heartbeat    Swollen ankles, feet, abdomen    Feeling dizzy or faint    Poor feeding and failing to grow normally (babies only)  How is a heart murmur treated?  An innocent heart murmur does not usually need treatment unless there is a clear cause, such as anemia. In such cases, treating the underlying cause should cure the murmur. In some cases, an innocent heart  murmur may go away on its own.  Treatment for an abnormal heart murmur depends on the cause. Options may include:    Medicines to help relieve symptoms    Procedures or surgery to fix or replace a diseased or damaged heart valve    Procedures or surgery to fix a hole in the heart  What are the complications of a heart murmur?  An innocent heart murmur has no complications. Complications of an abnormal heart murmur will vary depending on the cause. Possible complications include:    Heart failure. This problem occurs when the heart is so weak it no longer pumps blood well.    Infection of the heart s valves or inner lining (infective endocarditis)    Blood clots and stroke    Fainting    Heart attack    Sudden cardiac arrest. This problem occurs when the heart suddenly stops beating.  When should I call my healthcare provider?  Call your healthcare provider right away if you have any of these:    Chest pain    Shortness of breath    Fever of 100.4 F (38 C) or higher, or as directed    Symptoms that don t get better with treatment, or symptoms that get worse    New symptoms   Date Last Reviewed: 5/1/2016 2000-2017 [employer]. [address] [place] [address], [place] [address]. All rights reserved. This information is not intended as a substitute for professional medical care. Always follow your healthcare professional's instructions.

## 2018-10-04 NOTE — PROGRESS NOTES
SUBJECTIVE:   Evelyn Vargas is a 68 year old female who presents to clinic today for the following health issues:      Hypertension Follow-up      Outpatient blood pressures are being checked at home.  Results are does well.    Low Salt Diet: low salt      Amount of exercise or physical activity: None    Problems taking medications regularly: No    Medication side effects: none    Diet: low salt      Medication Followup of all medications    Taking Medication as prescribed: yes    Side Effects:  None    Medication Helping Symptoms:  yes       Diabetes Follow-up      Patient is checking blood sugars: not at all    Diabetic concerns: None     Symptoms of hypoglycemia (low blood sugar): none     Paresthesias (numbness or burning in feet) or sores: No     Date of last diabetic eye exam: due    BP Readings from Last 2 Encounters:   10/04/18 130/88   04/05/18 (!) 132/101     Hemoglobin A1C (%)   Date Value   04/05/2018 6.6 (H)   02/06/2017 5.3     LDL Cholesterol Calculated (mg/dL)   Date Value   04/05/2018 76   05/27/2014 110     LDL Cholesterol Direct (mg/dL)   Date Value   02/06/2017 116 (H)       Diabetes Management Resources  Chronic Kidney Disease Follow-up      Current NSAID use?  No    Hypothyroidism Follow-up      Since last visit, patient describes the following symptoms: Weight stable, no hair loss, no skin changes, no constipation, no loose stools      Problem list and histories reviewed & adjusted, as indicated.  Additional history: as documented    Patient Active Problem List   Diagnosis     Chronic pain syndrome     Mild persistent asthma     Vitamin B12 deficiency disease     Hypothyroidism     Obesity     Hypertension, goal below 140/90     Advanced directives, counseling/discussion     Generalized anxiety disorder     Hypertriglyceridemia     Opiate or related narcotic overdose, accidental or unintentional, subsequent encounter     Persistent insomnia     Vision loss of right eye     Chronic kidney  disease     Chronic fatigue fibromyalgia syndrome     Type 2 diabetes mellitus without complication, without long-term current use of insulin (H)     No past surgical history on file.    Social History   Substance Use Topics     Smoking status: Never Smoker     Smokeless tobacco: Never Used     Alcohol use No     Family History   Problem Relation Age of Onset     Family history unknown: Yes         Current Outpatient Prescriptions   Medication Sig Dispense Refill     albuterol (2.5 MG/3ML) 0.083% neb solution Take 1 vial (2.5 mg) by nebulization every 6 hours as needed 360 mL 11     albuterol (PROAIR HFA/PROVENTIL HFA/VENTOLIN HFA) 108 (90 BASE) MCG/ACT Inhaler Inhale 2 puffs into the lungs every 6 hours as needed for shortness of breath / dyspnea 1 Inhaler 11     amitriptyline (ELAVIL) 100 MG tablet Take 1 tablet (100 mg) by mouth At Bedtime 90 tablet 3     amitriptyline (ELAVIL) 50 MG tablet Take 1 tablet (50 mg) by mouth At Bedtime 90 tablet 3     Cholecalciferol (VITAMIN D) 2000 UNITS tablet Take 2,000 Units by mouth daily 100 tablet 3     cloNIDine (CATAPRES) 0.2 MG tablet Take 1 tablet (0.2 mg) by mouth 3 times daily 270 tablet 3     cyanocobalamin (VITAMIN B12) 1000 MCG/ML injection Inject 1 mL (1,000 mcg) into the muscle every 30 days 1 mL 11     cyclobenzaprine (FLEXERIL) 10 MG tablet Take 1 tablet (10 mg) by mouth 2 times daily as needed for muscle spasms 90 tablet 3     ferrous sulfate (IRON) 325 (65 FE) MG tablet Take 1 tablet (325 mg) by mouth 2 times daily 60 tablet 11     gabapentin (NEURONTIN) 300 MG capsule Take 1 capsule (300 mg) by mouth 2 times daily Arthritis, non-child proof caps 180 capsule 3     hydrOXYzine (ATARAX) 25 MG tablet Take 2 tablets (50 mg) by mouth nightly as needed for other (sleep) 30 tablet 6     losartan (COZAAR) 50 MG tablet Take 1 tablet (50 mg) by mouth daily 90 tablet 3     tiZANidine (ZANAFLEX) 4 MG tablet Take 1 tablet (4 mg) by mouth 3 times daily as needed for muscle  spasms 60 tablet 6     traMADol (ULTRAM) 50 MG tablet Take 1 tablet (50 mg) by mouth every 8 hours as needed for moderate pain 90 tablet 1     Allergies   Allergen Reactions     Acetaminophen      Other reaction(s): Vomiting     Morphine      Other reaction(s): GI Problems/ Nausea     Tylenol      Recent Labs   Lab Test  04/05/18   1134  02/06/17   1105   08/18/16   1222   05/27/14   1701  08/16/13   0820   A1C  6.6*  5.3   --   5.6   < >  5.8  5.7   LDL  76  116*   --    --    --   110  60   HDL  64  75   --    --    --   40*  28*   TRIG  151*   --    --    --    --   196*  107   ALT  50  33   --   29   < >  24  30   CR  0.94  0.99   --   0.70   < >  1.10*  1.16*   GFRESTIMATED  59*  56*   --   84   < >  50*  47*   GFRESTBLACK  72  68   --   >90   GFR Calc     < >  61  57*   POTASSIUM  3.7  4.3   --   3.9   < >  3.6  4.1   TSH  2.80  3.85   < >   --    < >  0.68  2.27    < > = values in this interval not displayed.      BP Readings from Last 3 Encounters:   10/04/18 130/88   04/05/18 (!) 132/101   02/06/17 132/88    Wt Readings from Last 3 Encounters:   10/04/18 199 lb (90.3 kg)   04/05/18 194 lb (88 kg)   02/06/17 189 lb (85.7 kg)                  Labs reviewed in EPIC    Reviewed and updated as needed this visit by clinical staff  Allergies  Meds       Reviewed and updated as needed this visit by Provider         ROS:  Constitutional, HEENT, cardiovascular, pulmonary, gi and gu systems are negative, except as otherwise noted.    OBJECTIVE:     /88  Pulse 112  Temp 98.3  F (36.8  C) (Oral)  Wt 199 lb (90.3 kg)  SpO2 97%  Breastfeeding? No  BMI 33.89 kg/m2  Body mass index is 33.89 kg/(m^2).  GENERAL: healthy, alert, well nourished, well hydrated, no distress, obese  HENT: ear canals- normal; TMs- normal; Nose- normal; Mouth- no ulcers, no lesions, throat is clear with no erythema or exudate.   NECK: no tenderness, no adenopathy, no asymmetry, no masses, no stiffness; thyroid- normal  to palpation  RESP: lungs clear to auscultation - no rales, no rhonchi, no wheezes  CV: regular rates and rhythm, normal S1 S2, no S3 or S4 and grade 2/6 systolic murmur, no click or rub -  ABDOMEN: soft, no tenderness, no  hepatosplenomegaly, no masses, normal bowel sounds, fairly large panniculus  MS: extremities- no gross deformities noted, no edema  SKIN: no suspicious lesions, no rashes  NEURO: strength and tone- normal, sensory exam- grossly normal, mentation- intact, speech- normal, reflexes- symmetric  BACK: no CVA tenderness, no paralumbar tenderness  PSYCH: Alert and oriented times 3; speech- coherent , normal rate and volume; able to articulate logical thoughts, able to abstract reason, no tangential thoughts, no hallucinations or delusions, affect- normal     Diagnostic Test Results:  Results for orders placed or performed in visit on 10/04/18 (from the past 24 hour(s))   HEMOGLOBIN A1C   Result Value Ref Range    Hemoglobin A1C 6.6 (H) 0 - 5.6 %   CBC with platelets   Result Value Ref Range    WBC 11.4 (H) 4.0 - 11.0 10e9/L    RBC Count 4.81 3.8 - 5.2 10e12/L    Hemoglobin 14.3 11.7 - 15.7 g/dL    Hematocrit 44.1 35.0 - 47.0 %    MCV 92 78 - 100 fl    MCH 29.7 26.5 - 33.0 pg    MCHC 32.4 31.5 - 36.5 g/dL    RDW 14.6 10.0 - 15.0 %    Platelet Count 333 150 - 450 10e9/L   Renal panel   Result Value Ref Range    Sodium 142 133 - 144 mmol/L    Potassium 3.9 3.4 - 5.3 mmol/L    Chloride 108 94 - 109 mmol/L    Carbon Dioxide 24 20 - 32 mmol/L    Anion Gap 10 3 - 14 mmol/L    Glucose 118 (H) 70 - 99 mg/dL    Urea Nitrogen 15 7 - 30 mg/dL    Creatinine 0.84 0.52 - 1.04 mg/dL    GFR Estimate 68 >60 mL/min/1.7m2    GFR Estimate If Black 82 >60 mL/min/1.7m2    Calcium 8.9 8.5 - 10.1 mg/dL    Phosphorus 2.9 2.5 - 4.5 mg/dL    Albumin 3.8 3.4 - 5.0 g/dL   TSH with free T4 reflex   Result Value Ref Range    TSH 3.60 0.40 - 4.00 mU/L       ASSESSMENT/PLAN:         Tobacco Cessation:   reports that she has never  "smoked. She has never used smokeless tobacco.      BMI:   Estimated body mass index is 33.89 kg/(m^2) as calculated from the following:    Height as of 4/5/18: 5' 4.25\" (1.632 m).    Weight as of this encounter: 199 lb (90.3 kg).   Weight management plan: Discussed healthy diet and exercise guidelines and patient will follow up in 6 months in clinic to re-evaluate.        ICD-10-CM    1. Mild persistent asthma with acute exacerbation J45.31 Stable and doing well   2. Type 2 diabetes mellitus without complication, without long-term current use of insulin (H) E11.9 HEMOGLOBIN A1C- follow up diabetes check   3. Screen for colon cancer Z12.11 Recommend colonoscopy   4. Visit for screening mammogram Z12.31 MA SCREENING DIGITAL BILAT - Future  (s+30)   5. Screening for diabetic peripheral neuropathy Z13.89 FOOT EXAM  NO CHARGE [91613.114]   6. Need for prophylactic vaccination and inoculation against influenza Z23 FLU VACCINE, INCREASED ANTIGEN, PRESV FREE, AGE 65+ [36215]     Vaccine Administration, Initial [13132]   7. Need for prophylactic vaccination against Streptococcus pneumoniae (pneumococcus) Z23    8. Hypothyroidism due to acquired atrophy of thyroid E03.4 TSH with free T4 reflex   9. Hypertension, goal below 140/90 I10 CBC with platelets     Albumin Random Urine Quantitative with Creat Ratio     CANCELED: Albumin Random Urine Quantitative with Creat Ratio   10. Chronic fatigue fibromyalgia syndrome R53.82 Doing better on amitriptyline at night and muscle relaxer. No narcotics or anxiolytics.      M79.7    11. CKD (chronic kidney disease) stage 3, GFR 30-59 ml/min (H) N18.3 Vitamin D Deficiency     Renal panel   12. Muscle spasm of back M62.830 tiZANidine (ZANAFLEX) 4 MG tablet- increase to three times a day    13. Persistent insomnia G47.00 hydrOXYzine (ATARAX) 25 MG tablet   14. Chronic pain syndrome G89.4 traMADol (ULTRAM) 50 MG tablet twice a day as needed.    15. Systolic ejection murmur R01.1 " Echocardiogram Complete       FURTHER TESTING:       - echocardiogram to assess new murmur  CONSULTATION/REFERRAL to cardiology as needed.  FUTURE LABS:       - Schedule fasting labs in 6 months  FUTURE APPOINTMENTS:       - Follow-up visit in 6 months or sooner if any questions or concerns.   Work on weight loss  Regular exercise  See Patient Instructions    Tere Camacho MD  [facility]    Injectable Influenza Immunization Documentation    1.  Is the person to be vaccinated sick today?   No    2. Does the person to be vaccinated have an allergy to a component   of the vaccine?   No  Egg Allergy Algorithm Link    3. Has the person to be vaccinated ever had a serious reaction   to influenza vaccine in the past?   No    4. Has the person to be vaccinated ever had Guillain-Barré syndrome?   No    Form completed by Thao YOUNG

## 2018-10-04 NOTE — MR AVS SNAPSHOT
After Visit Summary   10/4/2018    Evelyn Vargas    MRN: [id number]           Patient Information     Date Of Birth          1950        Visit Information        Provider Department      10/4/2018 1:00 PM [facility]        Today's Diagnoses     Mild persistent asthma with acute exacerbation    -  1    Type 2 diabetes mellitus without complication, without long-term current use of insulin (H)        Screen for colon cancer        Visit for screening mammogram        Screening for diabetic peripheral neuropathy        Need for prophylactic vaccination and inoculation against influenza        Need for prophylactic vaccination against Streptococcus pneumoniae (pneumococcus)        Hypothyroidism due to acquired atrophy of thyroid        Hypertension, goal below 140/90        Chronic fatigue fibromyalgia syndrome        CKD (chronic kidney disease) stage 3, GFR 30-59 ml/min (H)        Muscle spasm of back        Persistent insomnia        Chronic pain syndrome          Care Instructions    At [facility], we strive to deliver an exceptional experience to you, every time we see you.  If you receive a survey in the mail, please send us back your thoughts. We really do value your feedback.    Your care team:                            Family Medicine Internal Medicine   MD Anthony Ojeda MD Shantel Branch-Fleming, MD Katya Georgiev PA-C Megan Hill, APRN DARYN Schultz MD Pediatrics   ELIOT Rutledge, MD Mey Duarte APRN CNP   MD Jolly Pascual MD Deborah Mielke, MD Kim Thein, APRN [facility]      Clinic hours: Monday - Thursday 7 am-7 pm; Fridays 7 am-5 pm.   Urgent care: Monday - Friday 11 am-9 pm; Saturday and Sunday 9 am-5 pm.  Pharmacy : Monday -Thursday 8 am-8 pm; Friday 8 am-6 pm; Saturday and Sunday 9 am-5 pm.     Clinic: [phone]   Pharmacy: [phone]        Fibromyalgia  Fibromyalgia is a chronic condition. It causes pain and tenderness in connective tissues. This causes muscle pain. Often, there are also many tender areas throughout the body. Symptoms may also include stiffness and feelings of numbness and tingling. Symptoms may be worse upon waking up. They may increase with poor sleep, heavy activity, cold or damp weather, anxiety, or stress.  People with fibromyalgia often feel tired. They may have trouble sleeping. Other symptoms include morning stiffness, headaches, and painful menstrual periods. Some people have problems with thinking clearly and changes in memory.  The cause of fibromyalgia is not known. Symptoms are similar to that of other diseases. These include rheumatoid arthritis, low thyroid, chronic fatigue syndrome, and Lyme disease. In some cases, these diseases may occur together.  Fibromyalgia is often treated with medicines. You and your healthcare provider can discuss the medicine that may work best for you. You may have to try more than one medicine or combination of medicines before you find what works for you.  Home care    If your healthcare provider has prescribed or recommended medicines, take them as directed.    Rest as needed. Try to get enough sleep. If you have trouble sleeping, discuss this with your healthcare provider.     Be active. Regular exercise can help manage symptoms. Some options include walking, swimming, and biking. Strengthening exercises may also be helpful. Talk to your healthcare provider about the best ways to be active.    Follow a healthy diet. Limit caffeine and alcohol. If you smoke, ask your healthcare provider for help to stop.    Notice how your body reacts to stress. Learn to listen to your body signals. This will help you take action before the stress becomes severe.    Learn relaxation techniques. Also consider joining a stress reduction program or class.    Talk to your healthcare provider  about trying complementary treatments. These include acupuncture, hypnosis, and biofeedback. Yoga and jame chi may be helpful.    Ask your healthcare provider about cognitive behavioral therapy (CBT). This type of counseling can help people with fibromyalgia cope better with their illness.  Follow-up care  Follow up with your healthcare provider or as advised by our staff. In many cases, fibromyalgia is best treated with a team approach.  This may involve your primary care provider, a rheumatologist, a physical therapist, and a mental health professional.   For more information: National [place] of [employer]th[employer] and Musculoskeletal and Skin Diseases (NIAMS)  www.niams.nih.gov 720-291-1608  When to seek medical advice  Contact your healthcare provider right away if any of these occur:    Symptoms getting worse or new symptoms developing    You feel hopeless, helpless, or lose interest in day-to-day life  Date Last Reviewed: 3/1/2017    2085-3717 [employer]. [address] [place] [address]. All rights reserved. This information is not intended as a substitute for professional medical care. Always follow your healthcare professional's instructions.                Follow-ups after your visit        Follow-up notes from your care team     Return in about 6 months (around 4/4/2019) for Lab Work, medication follow up, Physical Exam.      Future tests that were ordered for you today     Open Future Orders        Priority Expected Expires Ordered    MA SCREENING DIGITAL BILAT - Future  (s+30) Routine  10/4/2019 10/4/2018            Who to contact     If you have questions or need follow up information about today's clinic visit or your schedule please contact [facility] directly at [phone].  Normal or non-critical lab and imaging results will be communicated to you by MyChart, letter or phone within 4 business days after the clinic has received the results. If you do not hear  from us within 7 days, please contact the clinic through [employer] or phone. If you have a critical or abnormal lab result, we will notify you by phone as soon as possible.  Submit refill requests through [employer] or call your pharmacy and they will forward the refill request to us. Please allow 3 business days for your refill to be completed.          Additional Information About Your Visit        Care EveryWhere ID     This is your Care EveryWhere ID. This could be used by other organizations to access your [facility] [place] medical records  [id number]        Your Vitals Were     Pulse Temperature Pulse Oximetry Breastfeeding? BMI (Body Mass Index)       112 98.3  F (36.8  C) (Oral) 97% No 33.89 kg/m2        Blood Pressure from Last 3 Encounters:   10/04/18 130/88   04/05/18 (!) 132/101   02/06/17 132/88    Weight from Last 3 Encounters:   10/04/18 199 lb (90.3 kg)   04/05/18 194 lb (88 kg)   02/06/17 189 lb (85.7 kg)              We Performed the Following     Albumin Random Urine Quantitative with Creat Ratio     CBC with platelets     FLU VACCINE, INCREASED ANTIGEN, PRESV FREE, AGE 65+ [16846]     FOOT EXAM  NO CHARGE [75212.114]     HEMOGLOBIN A1C     Renal panel     TSH with free T4 reflex     Vaccine Administration, Initial [29675]     Vitamin D Deficiency          Where to get your medicines      These medications were sent to [address] [place] [address]     Phone:  [phone]     hydrOXYzine 25 MG tablet    tiZANidine 4 MG tablet         Some of these will need a paper prescription and others can be bought over the counter.  Ask your nurse if you have questions.     Bring a paper prescription for each of these medications     traMADol 50 MG tablet         Information about OPIOIDS     PRESCRIPTION OPIOIDS: WHAT YOU NEED TO KNOW   We gave you an opioid (narcotic) pain medicine. It is important to manage your pain, but opioids are  not always the best choice. You should first try all the other options your care team gave you. Take this medicine for as short a time (and as few doses) as possible.    Some activities can increase your pain, such as bandage changes or therapy sessions. It may help to take your pain medicine 30 to 60 minutes before these activities. Reduce your stress by getting enough sleep, working on hobbies you enjoy and practicing relaxation or meditation. Talk to your care team about ways to manage your pain beyond prescription opioids.    These medicines have risks:    DO NOT drive when on new or higher doses of pain medicine. These medicines can affect your alertness and reaction times, and you could be arrested for driving under the influence (DUI). If you need to use opioids long-term, talk to your care team about driving.    DO NOT operate heavy machinery    DO NOT do any other dangerous activities while taking these medicines.    DO NOT drink any alcohol while taking these medicines.     If the opioid prescribed includes acetaminophen, DO NOT take with any other medicines that contain acetaminophen. Read all labels carefully. Look for the word  acetaminophen  or  Tylenol.  Ask your pharmacist if you have questions or are unsure.    You can get addicted to pain medicines, especially if you have a history of addiction (chemical, alcohol or substance dependence). Talk to your care team about ways to reduce this risk.    All opioids tend to cause constipation. Drink plenty of water and eat foods that have a lot of fiber, such as fruits, vegetables, prune juice, apple juice and high-fiber cereal. Take a laxative (Miralax, milk of magnesia, Colace, Senna) if you don t move your bowels at least every other day. Other side effects include upset stomach, sleepiness, dizziness, throwing up, tolerance (needing more of the medicine to have the same effect), physical dependence and slowed breathing.    Store your pills in a secure  place, locked if possible. We will not replace any lost or stolen medicine. If you don t finish your medicine, please throw away (dispose) as directed by your pharmacist. The [place] Pollution [employer] Agency has more information about safe disposal: https://www.pca.[facility].mn.us/living-green/managing-unwanted-medications         Primary Care Provider Office Phone # Fax #    Tere Jacinta Camacho [phone]       [address]  [facility] [address]        Equal Access to Services     [facility]: Hadii aad ku hadasho Soomaali, waaxda luqadaha, qaybta kaalmada adeegyada, waxay idiin hayaan adeeg kharash lawilly . So [facility] [phone].    ATENCIÓN: Si habla español, tiene a howard disposición servicios gratuitos de asistencia lingüística. Rosalio al [phone].    We comply with applicable federal civil rights laws and [place] laws. We do not discriminate on the basis of race, color, national origin, age, disability, sex, sexual orientation, or gender identity.            Thank you!     Thank you for choosing [facility]  for your care. Our goal is always to provide you with excellent care. Hearing back from our patients is one way we can continue to improve our services. Please take a few minutes to complete the written survey that you may receive in the mail after your visit with us. Thank you!             Your Updated Medication List - Protect others around you: Learn how to safely use, store and throw away your medicines at www.disposemymeds.org.          This list is accurate as of 10/4/18  1:45 PM.  Always use your most recent med list.                   Brand Name Dispense Instructions for use Diagnosis    * albuterol 108 (90 Base) MCG/ACT inhaler    PROAIR HFA/PROVENTIL HFA/VENTOLIN HFA    1 Inhaler    Inhale 2 puffs into the lungs every 6 hours as needed for shortness of breath / dyspnea    Mild persistent asthma without complication       * albuterol (2.5 MG/3ML) 0.083%  neb solution     360 mL    Take 1 vial (2.5 mg) by nebulization every 6 hours as needed    Mild persistent asthma without complication       * amitriptyline 100 MG tablet    ELAVIL    90 tablet    Take 1 tablet (100 mg) by mouth At Bedtime    Persistent insomnia       * amitriptyline 50 MG tablet    ELAVIL    90 tablet    Take 1 tablet (50 mg) by mouth At Bedtime    Recurrent major depressive disorder, in partial remission (H)       cloNIDine 0.2 MG tablet    CATAPRES    270 tablet    Take 1 tablet (0.2 mg) by mouth 3 times daily    Hypertension, goal below 140/90       cyanocobalamin 1000 MCG/ML injection    VITAMIN B12    1 mL    Inject 1 mL (1,000 mcg) into the muscle every 30 days    Vitamin B12 deficiency (non anemic)       cyclobenzaprine 10 MG tablet    FLEXERIL    90 tablet    Take 1 tablet (10 mg) by mouth 2 times daily as needed for muscle spasms    Back muscle spasm       ferrous sulfate 325 (65 Fe) MG tablet    IRON    60 tablet    Take 1 tablet (325 mg) by mouth 2 times daily    Other iron deficiency anemia       gabapentin 300 MG capsule    NEURONTIN    180 capsule    Take 1 capsule (300 mg) by mouth 2 times daily Arthritis, non-child proof caps    Neuropathy       hydrOXYzine 25 MG tablet    ATARAX    60 tablet    Take 2 tablets (50 mg) by mouth nightly as needed for other (sleep)    Persistent insomnia       losartan 50 MG tablet    COZAAR    90 tablet    Take 1 tablet (50 mg) by mouth daily    Essential hypertension with goal blood pressure less than 140/90       tiZANidine 4 MG tablet    ZANAFLEX    90 tablet    Take 1 tablet (4 mg) by mouth 3 times daily as needed for muscle spasms    Muscle spasm of back       traMADol 50 MG tablet    ULTRAM    90 tablet    Take 1 tablet (50 mg) by mouth every 8 hours as needed for moderate pain    Chronic pain syndrome       vitamin D 2000 units tablet     100 tablet    Take 2,000 Units by mouth daily    Vitamin D deficiency       * Notice:  This list has 4  medication(s) that are the same as other medications prescribed for you. Read the directions carefully, and ask your doctor or other care provider to review them with you.

## 2018-10-05 LAB — DEPRECATED CALCIDIOL+CALCIFEROL SERPL-MC: 21 UG/L (ref 20–75)

## 2018-10-05 ASSESSMENT — ANXIETY QUESTIONNAIRES: GAD7 TOTAL SCORE: 1

## 2018-10-05 ASSESSMENT — ASTHMA QUESTIONNAIRES: ACT_TOTALSCORE: 23

## 2018-10-05 ASSESSMENT — PATIENT HEALTH QUESTIONNAIRE - PHQ9: SUM OF ALL RESPONSES TO PHQ QUESTIONS 1-9: 14

## 2018-10-06 NOTE — PROGRESS NOTES
Dear Evelyn Vargas,    Your test results are attached. I am happy to let you know that they are stable and your medications can stay the same.    The diabetes test is still in diabetic range, but is stable. You do not need medication at this time. The vitamin D test is in normal range but on the low side, so please continue to take the 2000 units once a day. The kidneys are healthy. The thyroid test is normal. We can recheck labs in 6 months.     Please call me if you have any questions about these test results or about your care.    Sincerely,    Tere Camacho MD

## 2018-11-27 DIAGNOSIS — G89.4 CHRONIC PAIN SYNDROME: ICD-10-CM

## 2018-11-27 RX ORDER — TRAMADOL HYDROCHLORIDE 50 MG/1
50 TABLET ORAL EVERY 8 HOURS PRN
Qty: 90 TABLET | Refills: 1 | Status: SHIPPED | OUTPATIENT
Start: 2018-11-27 | End: 2019-01-24

## 2018-11-27 NOTE — TELEPHONE ENCOUNTER
Requested Prescriptions   Pending Prescriptions Disp Refills     traMADol (ULTRAM) 50 MG tablet        Last Written Prescription Date:  10/04/18  Last Fill Quantity: 90,   # refills: 1  Last Office Visit: 10/04/18-Doug  Future Office visit:       Routing refill request to provider for review/approval because:  Drug not on the [facility], [facility] or [facility] refill protocol or controlled substance 90 tablet 1     Sig: Take 1 tablet (50 mg) by mouth every 8 hours as needed for moderate pain    There is no refill protocol information for this order

## 2019-01-14 PROBLEM — F31.60 BIPOLAR 1 DISORDER, MIXED (H): Chronic | Status: ACTIVE | Noted: 2019-01-14

## 2019-01-24 ENCOUNTER — TELEPHONE (OUTPATIENT)
Dept: FAMILY MEDICINE | Facility: CLINIC | Age: 69
End: 2019-01-24

## 2019-01-24 DIAGNOSIS — G89.4 CHRONIC PAIN SYNDROME: ICD-10-CM

## 2019-01-24 RX ORDER — TRAMADOL HYDROCHLORIDE 50 MG/1
50 TABLET ORAL EVERY 8 HOURS PRN
Qty: 90 TABLET | Refills: 1 | Status: SHIPPED | OUTPATIENT
Start: 2019-01-24 | End: 2019-04-02

## 2019-01-24 NOTE — TELEPHONE ENCOUNTER
Requested Prescriptions   Pending Prescriptions Disp Refills     traMADol (ULTRAM) 50 MG tablet        Last Written Prescription Date:  11/27/18  Last Fill Quantity: 90,   # refills: 1  Last Office Visit: 10/04/18-Doug  Future Office visit:       Routing refill request to provider for review/approval because:  Drug not on the [facility], [facility] or [facility] refill protocol or controlled substance 90 tablet 1     Sig: Take 1 tablet (50 mg) by mouth every 8 hours as needed for moderate pain    There is no refill protocol information for this order

## 2019-01-31 DIAGNOSIS — F33.41 RECURRENT MAJOR DEPRESSIVE DISORDER, IN PARTIAL REMISSION (H): ICD-10-CM

## 2019-01-31 NOTE — TELEPHONE ENCOUNTER
"Requested Prescriptions   Pending Prescriptions Disp Refills     amitriptyline (ELAVIL) 50 MG tablet [Pharmacy Med Name: AMITRIPTYLINE HCL 50MG TABS] 90 tablet 3    Last Written Prescription Date:  4/5/18  Last Fill Quantity: 90,  # refills: 3   Last Office Visit with G, P or [facility] prescribing provider:  10/4/18   Future Office Visit:      Sig: TAKE ONE TABLET BY MOUTH AT BEDTIME    Tricyclic Agents ( Annual appt and no PHQ9) Passed - 1/31/2019  1:25 PM       Passed - Blood Pressure under 140/90 in past 12 mos    BP Readings from Last 3 Encounters:   10/04/18 130/88   04/05/18 (!) 132/101   02/06/17 132/88                Passed - Recent (12 mo) or future (30 days) visit within authorizing provider's specialty    Patient had office visit in the last 12 months or has a visit in the next 30 days with authorizing provider or within the authorizing provider's specialty.  See \"Patient Info\" tab in inbasket, or \"Choose Columns\" in Meds & Orders section of the refill encounter.             Passed - Medication is active on med list       Passed - Patient is age 18 or older       Passed - Patient is not pregnant       Passed - No positive pregnancy test on record in past 12 mos          "

## 2019-01-31 NOTE — TELEPHONE ENCOUNTER
Patient talked with Pharmacy and was told that because we have patients name different then them. Because of this the medication was denied. Patient needs medication to be sent again to [facility].   [facility] has Evelyn Vargas - patient also is under Evelyn Deleon

## 2019-02-01 RX ORDER — AMITRIPTYLINE HYDROCHLORIDE 50 MG/1
TABLET ORAL
Qty: 90 TABLET | Refills: 3 | OUTPATIENT
Start: 2019-02-01

## 2019-02-01 NOTE — TELEPHONE ENCOUNTER
Medication Detail      Disp Refills Start End SOFIA   amitriptyline (ELAVIL) 50 MG tablet 90 tablet 3 4/5/2018  No   Sig - Route: Take 1 tablet (50 mg) by mouth At Bedtime - Oral   Sent to pharmacy as: amitriptyline (ELAVIL) 50 MG tablet   Class: E-Prescribe   Order: [id number]   E-Prescribing Status: Receipt confirmed by pharmacy (4/5/2018 11:09 AM CDT)   Printout Tracking     External Result Report   Pharmacy     [facility], [place] [address] [place] [address] [address] [place] [address]     Too soon to refill. Patient should have enough Rx until March 2019.    Lucy Buenrostro RN

## 2019-02-05 DIAGNOSIS — F33.41 RECURRENT MAJOR DEPRESSIVE DISORDER, IN PARTIAL REMISSION (H): ICD-10-CM

## 2019-02-05 DIAGNOSIS — G47.00 PERSISTENT INSOMNIA: ICD-10-CM

## 2019-02-05 NOTE — TELEPHONE ENCOUNTER
"Requested Prescriptions   Pending Prescriptions Disp Refills     amitriptyline (ELAVIL) 100 MG tablet [Pharmacy Med Name: AMITRIPTYLINE HCL 100MG TABS] 90 tablet 3          Last Written Prescription Date:  4/5/18  Last Fill Quantity: 90,  # refills: 3   Last Office Visit with G, P or [facility] prescribing provider:  10/4/18   Future Office Visit:      Sig: TAKE ONE TABLET BY MOUTH AT BEDTIME    Tricyclic Agents ( Annual appt and no PHQ9) Passed - 2/5/2019 11:15 AM       Passed - Blood Pressure under 140/90 in past 12 mos    BP Readings from Last 3 Encounters:   10/04/18 130/88   04/05/18 (!) 132/101   02/06/17 132/88                Passed - Recent (12 mo) or future (30 days) visit within authorizing provider's specialty    Patient had office visit in the last 12 months or has a visit in the next 30 days with authorizing provider or within the authorizing provider's specialty.  See \"Patient Info\" tab in inbasket, or \"Choose Columns\" in Meds & Orders section of the refill encounter.             Passed - Medication is active on med list       Passed - Patient is age 18 or older       Passed - Patient is not pregnant       Passed - No positive pregnancy test on record in past 12 mos              Antoine Faarax  [facility]  "

## 2019-02-06 NOTE — NURSING NOTE
"Chief Complaint   Patient presents with     Pre-Op Exam     Fasting       Initial /90 mmHg  Pulse 99  Temp(Src) 97.6  F (36.4  C) (Oral)  Ht 5' 5\" (1.651 m)  Wt 189 lb (85.73 kg)  BMI 31.45 kg/m2  SpO2 95%  Breastfeeding? No Estimated body mass index is 31.45 kg/(m^2) as calculated from the following:    Height as of this encounter: 5' 5\" (1.651 m).    Weight as of this encounter: 189 lb (85.73 kg).  Medication Reconciliation: complete     Giuliano Sapp CMA    " ----- Message from Lokesh Hidalgo DO sent at 2/5/2019  3:49 PM EST -----  Please let her know that her labs were reviewed her sodium was 135 potassium was 3 6  This could be related to the chlorthalidone that was initiated  I do not think she is taking the potassium citrate any more because of GI upset  Can you confirm   the  If she is not taking in please also start her on additional potassium chloride 20 mEq daily  Have her repeat a BMP in 1 month if her electrolytes are stable we can continue the medication

## 2019-02-07 RX ORDER — AMITRIPTYLINE HYDROCHLORIDE 100 MG/1
TABLET ORAL
Qty: 90 TABLET | Refills: 1 | Status: SHIPPED | OUTPATIENT
Start: 2019-02-07 | End: 2019-05-24

## 2019-02-07 RX ORDER — AMITRIPTYLINE HYDROCHLORIDE 50 MG/1
TABLET ORAL
Qty: 90 TABLET | Refills: 3 | OUTPATIENT
Start: 2019-02-07

## 2019-02-07 NOTE — TELEPHONE ENCOUNTER
Prescription approved per [facility] Refill Protocol.    Alice Hernandez RN  [facility]

## 2019-02-11 DIAGNOSIS — F33.41 RECURRENT MAJOR DEPRESSIVE DISORDER, IN PARTIAL REMISSION (H): ICD-10-CM

## 2019-02-11 RX ORDER — AMITRIPTYLINE HYDROCHLORIDE 50 MG/1
TABLET ORAL
Qty: 90 TABLET | Refills: 1 | Status: SHIPPED | OUTPATIENT
Start: 2019-02-11 | End: 2019-05-24

## 2019-02-11 NOTE — TELEPHONE ENCOUNTER
Another duplicate, rx sent for 100mg tablets, this request is for 50mg.  Please let pharmacy know if there was a change so we don't receive more duplicates please.    amitriptyline (ELAVIL) 100 MG tablet 90 tablet 1 2/7/2019

## 2019-02-11 NOTE — TELEPHONE ENCOUNTER
Per telephone encounter on 2/5/19, patient takes 150 mg nightly.   Prescription approved per [facility] Refill Protocol.      Fredy Jean-Baptiste RN, BSN

## 2019-02-11 NOTE — TELEPHONE ENCOUNTER
Reason for Call:  Medication or medication refill:    Do you use a [facility]?  Name of the pharmacy and phone number for the current request:  [facility] [place] [phone]    Name of the medication requested: amitriptyline (ELAVIL) 50 MG tablet     Other request: Patient gets a supply of each 50 mg tabs as well as 100 mg tablets.  Picked up the 100 mg tabs.  [facility] pharmacy has attempted to contact Dr. Camacho regarding this.  She is out of the 50 mg tablets.    Can we leave a detailed message on this number? YES    Phone number patient can be reached at: Home number on file [phone] (home)    Best Time: Any     Call taken on 2/11/2019 at 10:33 AM by Kurt Fan

## 2019-04-02 DIAGNOSIS — G89.4 CHRONIC PAIN SYNDROME: ICD-10-CM

## 2019-04-02 RX ORDER — TRAMADOL HYDROCHLORIDE 50 MG/1
50 TABLET ORAL EVERY 8 HOURS PRN
Qty: 90 TABLET | Refills: 1 | Status: SHIPPED | OUTPATIENT
Start: 2019-04-02 | End: 2019-05-24

## 2019-04-02 NOTE — TELEPHONE ENCOUNTER
Routing refill request to provider for review/approval because:  Drug not on the [facility] refill protocol       Fredy Jean-Baptiste RN, BSN

## 2019-04-02 NOTE — TELEPHONE ENCOUNTER
Requested Prescriptions   Pending Prescriptions Disp Refills     traMADol (ULTRAM) 50 MG tablet 90 tablet 1     Sig: Take 1 tablet (50 mg) by mouth every 8 hours as needed for moderate pain    There is no refill protocol information for this order        Last Written Prescription Date:  1/24/19  Last Fill Quantity: 90,  # refills: 1   Last Office Visit with [facility], [facility] or [facility] prescribing provider:  10/4/18   Future Office Visit:

## 2019-04-03 NOTE — TELEPHONE ENCOUNTER
traMADol (ULTRAM) 50 MG tablet rx has been faxed to [facility] pharmacy, [place], [place].      Nguyen REIS)(ANNA)

## 2019-04-30 ENCOUNTER — TELEPHONE (OUTPATIENT)
Dept: FAMILY MEDICINE | Facility: CLINIC | Age: 69
End: 2019-04-30

## 2019-04-30 DIAGNOSIS — M62.830 MUSCLE SPASM OF BACK: ICD-10-CM

## 2019-04-30 DIAGNOSIS — M62.830 BACK MUSCLE SPASM: ICD-10-CM

## 2019-05-01 RX ORDER — CYCLOBENZAPRINE HCL 10 MG
TABLET ORAL
Qty: 90 TABLET | Refills: 3 | Status: SHIPPED | OUTPATIENT
Start: 2019-05-01 | End: 2019-05-06 | Stop reason: ALTCHOICE

## 2019-05-06 ENCOUNTER — TELEPHONE (OUTPATIENT)
Dept: FAMILY MEDICINE | Facility: CLINIC | Age: 69
End: 2019-05-06

## 2019-05-06 DIAGNOSIS — M62.830 MUSCLE SPASM OF BACK: ICD-10-CM

## 2019-05-06 NOTE — TELEPHONE ENCOUNTER
Per provider documentation in 4/30/19 telephone encounter:   Tere Camacho MD10:56 AM   Note      I refilled the cyclobenzaprine but not the Zanaflex. Should not be taking both of these.  Tere Camacho MD           See message below, patient stating she prefers to take Zanaflex.  Provider to please review and advise, thank you.    Alice Hernandez RN

## 2019-05-06 NOTE — TELEPHONE ENCOUNTER
.Reason for Call:  medication change    Detailed comments: told by pharmacy she cannot take both the flexeril and tizanidine;     Patient prefers the tizanidine;     [facility] [phone]    Phone Number Patient can be reached at: Home number on file [phone] (home)    Best Time: anytime    Can we leave a detailed message on this number? YES    Call taken on 5/6/2019 at 9:59 AM by Anel Damico

## 2019-05-08 DIAGNOSIS — M62.830 MUSCLE SPASM OF BACK: ICD-10-CM

## 2019-05-09 NOTE — TELEPHONE ENCOUNTER
Requested Prescriptions   Pending Prescriptions Disp Refills     tiZANidine (ZANAFLEX) 4 MG tablet [Pharmacy Med Name: TIZANIDINE HCL 4MG TABS]      Last Written Prescription Date:  5/6/19  Last Fill Quantity: 90,  # refills: 6   Last Office Visit with [facility], [facility] or [facility] prescribing provider:  10/4/18   Future Office Visit:      90 tablet 6     Sig: TAKE ONE TABLET BY MOUTH THREE TIMES A DAY AS NEEDED FOR MUSLCE SPAMS       There is no refill protocol information for this order              Antoine Faarax  [facility]

## 2019-05-11 DIAGNOSIS — M62.830 MUSCLE SPASM OF BACK: ICD-10-CM

## 2019-05-11 DIAGNOSIS — J45.30 MILD PERSISTENT ASTHMA WITHOUT COMPLICATION: ICD-10-CM

## 2019-05-11 NOTE — TELEPHONE ENCOUNTER
"Requested Prescriptions   Pending Prescriptions Disp Refills     VENTOLIN  (90 Base) MCG/ACT inhaler [Pharmacy Med Name: VENTOLIN HFA 108MCG/ACT AER]      Last Written Prescription Date:  4/5/18  Last Fill Quantity: 1,  # refills: 11   Last Office Visit with G, P or [facility] prescribing provider:  10/4/18   Future Office Visit:    Next 5 appointments (look out 90 days)    May 24, 2019  3:40 PM CDT  Office Visit with Tere Camacho MD  [facility] [address] [place] [id number]  [phone]          18 g 11     Sig: INHALE 2 PUFFS EVERY 6 HOURS AS NEEDED SHORTNESS OF BREATH       Asthma Maintenance Inhalers - Anticholinergics Failed - 5/11/2019 12:18 PM        Failed - Asthma control assessment score within normal limits in last 6 months     Please review ACT score.           Passed - Patient is age 12 years or older        Passed - Medication is active on med list        Passed - Recent (6 mo) or future (30 days) visit within the authorizing provider's specialty     Patient had office visit in the last 6 months or has a visit in the next 30 days with authorizing provider or within the authorizing provider's specialty.  See \"Patient Info\" tab in inbasket, or \"Choose Columns\" in Meds & Orders section of the refill encounter.                Antoine Faarax  [facility]    "

## 2019-05-11 NOTE — TELEPHONE ENCOUNTER
Requested Prescriptions   Pending Prescriptions Disp Refills     tiZANidine (ZANAFLEX) 4 MG tablet [Pharmacy Med Name: TIZANIDINE HCL 4MG TABS]      Last Written Prescription Date:  5/6/19  Last Fill Quantity: 90,  # refills: 6   Last Office Visit with FMG, RAKELP or [facility] prescribing provider:  10/4/18   Future Office Visit:    Next 5 appointments (look out 90 days)    May 24, 2019  3:40 PM CDT  Office Visit with Tere Camacho MD  [facility] ([facility] [address] [place] [id number]  [phone]          90 tablet 6     Sig: TAKE ONE TABLET BY MOUTH THREE TIMES A DAY AS NEEDED FOR MUSLCE SPAMS       There is no refill protocol information for this order              Antoine Faarax  [facility]

## 2019-05-14 RX ORDER — ALBUTEROL SULFATE 90 UG/1
AEROSOL, METERED RESPIRATORY (INHALATION)
Qty: 18 G | Refills: 3 | Status: SHIPPED | OUTPATIENT
Start: 2019-05-14 | End: 2020-03-09

## 2019-05-14 NOTE — TELEPHONE ENCOUNTER
ACT Total Scores 5/15/2017 4/5/2018 10/4/2018   ACT TOTAL SCORE - - -   ASTHMA ER VISITS - - -   ASTHMA HOSPITALIZATIONS - - -   ACT TOTAL SCORE (Goal Greater than or Equal to 20) 20 13 23   In the past 12 months, how many times did you visit the emergency room for your asthma without being admitted to the hospital? 0 0 0   In the past 12 months, how many times were you hospitalized overnight because of your asthma? 0 0 0     Routing refill request to provider for review/approval because:  ACT needs to be updated      Fredy Jean-Baptiste RN, BSN, PHN

## 2019-05-24 ENCOUNTER — OFFICE VISIT (OUTPATIENT)
Dept: FAMILY MEDICINE | Facility: CLINIC | Age: 69
End: 2019-05-24
Payer: MEDICARE

## 2019-05-24 VITALS
BODY MASS INDEX: 31.98 KG/M2 | HEIGHT: 66 IN | OXYGEN SATURATION: 95 % | SYSTOLIC BLOOD PRESSURE: 138 MMHG | WEIGHT: 199 LBS | DIASTOLIC BLOOD PRESSURE: 92 MMHG | RESPIRATION RATE: 20 BRPM | TEMPERATURE: 98.1 F | HEART RATE: 113 BPM

## 2019-05-24 DIAGNOSIS — E66.811 CLASS 1 OBESITY DUE TO EXCESS CALORIES WITH SERIOUS COMORBIDITY AND BODY MASS INDEX (BMI) OF 32.0 TO 32.9 IN ADULT: ICD-10-CM

## 2019-05-24 DIAGNOSIS — G62.9 NEUROPATHY: ICD-10-CM

## 2019-05-24 DIAGNOSIS — I10 HYPERTENSION, GOAL BELOW 140/90: ICD-10-CM

## 2019-05-24 DIAGNOSIS — N18.30 TYPE 2 DIABETES MELLITUS WITH STAGE 3 CHRONIC KIDNEY DISEASE, WITHOUT LONG-TERM CURRENT USE OF INSULIN (H): ICD-10-CM

## 2019-05-24 DIAGNOSIS — G47.00 PERSISTENT INSOMNIA: ICD-10-CM

## 2019-05-24 DIAGNOSIS — Z23 NEED FOR VACCINATION AGAINST STREPTOCOCCUS PNEUMONIAE: ICD-10-CM

## 2019-05-24 DIAGNOSIS — E66.09 CLASS 1 OBESITY DUE TO EXCESS CALORIES WITH SERIOUS COMORBIDITY AND BODY MASS INDEX (BMI) OF 32.0 TO 32.9 IN ADULT: ICD-10-CM

## 2019-05-24 DIAGNOSIS — F33.41 RECURRENT MAJOR DEPRESSIVE DISORDER, IN PARTIAL REMISSION (H): ICD-10-CM

## 2019-05-24 DIAGNOSIS — E11.22 TYPE 2 DIABETES MELLITUS WITH STAGE 3 CHRONIC KIDNEY DISEASE, WITHOUT LONG-TERM CURRENT USE OF INSULIN (H): ICD-10-CM

## 2019-05-24 DIAGNOSIS — Z00.00 ROUTINE HISTORY AND PHYSICAL EXAMINATION OF ADULT: Primary | ICD-10-CM

## 2019-05-24 DIAGNOSIS — G89.4 CHRONIC PAIN SYNDROME: ICD-10-CM

## 2019-05-24 DIAGNOSIS — I10 ESSENTIAL HYPERTENSION WITH GOAL BLOOD PRESSURE LESS THAN 140/90: ICD-10-CM

## 2019-05-24 LAB
ALBUMIN SERPL-MCNC: 3.7 G/DL (ref 3.4–5)
ANION GAP SERPL CALCULATED.3IONS-SCNC: 9 MMOL/L (ref 3–14)
BUN SERPL-MCNC: 12 MG/DL (ref 7–30)
CALCIUM SERPL-MCNC: 9.5 MG/DL (ref 8.5–10.1)
CHLORIDE SERPL-SCNC: 105 MMOL/L (ref 94–109)
CO2 SERPL-SCNC: 25 MMOL/L (ref 20–32)
CREAT SERPL-MCNC: 1.06 MG/DL (ref 0.52–1.04)
ERYTHROCYTE [DISTWIDTH] IN BLOOD BY AUTOMATED COUNT: 13.9 % (ref 10–15)
GFR SERPL CREATININE-BSD FRML MDRD: 54 ML/MIN/{1.73_M2}
GLUCOSE SERPL-MCNC: 136 MG/DL (ref 70–99)
HBA1C MFR BLD: 6.6 % (ref 0–5.6)
HCT VFR BLD AUTO: 43.2 % (ref 35–47)
HGB BLD-MCNC: 13.9 G/DL (ref 11.7–15.7)
MCH RBC QN AUTO: 29.3 PG (ref 26.5–33)
MCHC RBC AUTO-ENTMCNC: 32.2 G/DL (ref 31.5–36.5)
MCV RBC AUTO: 91 FL (ref 78–100)
PHOSPHATE SERPL-MCNC: 3.8 MG/DL (ref 2.5–4.5)
PLATELET # BLD AUTO: 390 10E9/L (ref 150–450)
POTASSIUM SERPL-SCNC: 4.2 MMOL/L (ref 3.4–5.3)
RBC # BLD AUTO: 4.75 10E12/L (ref 3.8–5.2)
SODIUM SERPL-SCNC: 139 MMOL/L (ref 133–144)
WBC # BLD AUTO: 13.1 10E9/L (ref 4–11)

## 2019-05-24 PROCEDURE — 85027 COMPLETE CBC AUTOMATED: CPT | Performed by: FAMILY MEDICINE

## 2019-05-24 PROCEDURE — 36415 COLL VENOUS BLD VENIPUNCTURE: CPT | Performed by: FAMILY MEDICINE

## 2019-05-24 PROCEDURE — G0439 PPPS, SUBSEQ VISIT: HCPCS | Performed by: FAMILY MEDICINE

## 2019-05-24 PROCEDURE — 80069 RENAL FUNCTION PANEL: CPT | Performed by: FAMILY MEDICINE

## 2019-05-24 PROCEDURE — G0009 ADMIN PNEUMOCOCCAL VACCINE: HCPCS | Performed by: FAMILY MEDICINE

## 2019-05-24 PROCEDURE — 90670 PCV13 VACCINE IM: CPT | Performed by: FAMILY MEDICINE

## 2019-05-24 PROCEDURE — 83036 HEMOGLOBIN GLYCOSYLATED A1C: CPT | Performed by: FAMILY MEDICINE

## 2019-05-24 RX ORDER — CLONIDINE HYDROCHLORIDE 0.2 MG/1
0.2 TABLET ORAL 3 TIMES DAILY
Qty: 270 TABLET | Refills: 3 | Status: SHIPPED | OUTPATIENT
Start: 2019-05-24 | End: 2020-08-17

## 2019-05-24 RX ORDER — LOSARTAN POTASSIUM 50 MG/1
50 TABLET ORAL DAILY
Qty: 90 TABLET | Refills: 3 | Status: SHIPPED | OUTPATIENT
Start: 2019-05-24 | End: 2019-06-28

## 2019-05-24 RX ORDER — AMITRIPTYLINE HYDROCHLORIDE 50 MG/1
50 TABLET ORAL AT BEDTIME
Qty: 90 TABLET | Refills: 3 | Status: SHIPPED | OUTPATIENT
Start: 2019-05-24 | End: 2020-05-19

## 2019-05-24 RX ORDER — GABAPENTIN 300 MG/1
300 CAPSULE ORAL
Qty: 180 CAPSULE | Refills: 3 | Status: SHIPPED | OUTPATIENT
Start: 2019-05-24 | End: 2020-11-04

## 2019-05-24 RX ORDER — TRAMADOL HYDROCHLORIDE 50 MG/1
50 TABLET ORAL EVERY 8 HOURS PRN
Qty: 90 TABLET | Refills: 1 | Status: SHIPPED | OUTPATIENT
Start: 2019-05-24 | End: 2019-07-18

## 2019-05-24 RX ORDER — AMITRIPTYLINE HYDROCHLORIDE 100 MG/1
100 TABLET ORAL AT BEDTIME
Qty: 90 TABLET | Refills: 3 | Status: SHIPPED | OUTPATIENT
Start: 2019-05-24 | End: 2020-05-19

## 2019-05-24 ASSESSMENT — ANXIETY QUESTIONNAIRES
7. FEELING AFRAID AS IF SOMETHING AWFUL MIGHT HAPPEN: NOT AT ALL
5. BEING SO RESTLESS THAT IT IS HARD TO SIT STILL: NOT AT ALL
2. NOT BEING ABLE TO STOP OR CONTROL WORRYING: NOT AT ALL
1. FEELING NERVOUS, ANXIOUS, OR ON EDGE: NOT AT ALL
GAD7 TOTAL SCORE: 4
3. WORRYING TOO MUCH ABOUT DIFFERENT THINGS: MORE THAN HALF THE DAYS
6. BECOMING EASILY ANNOYED OR IRRITABLE: MORE THAN HALF THE DAYS
IF YOU CHECKED OFF ANY PROBLEMS ON THIS QUESTIONNAIRE, HOW DIFFICULT HAVE THESE PROBLEMS MADE IT FOR YOU TO DO YOUR WORK, TAKE CARE OF THINGS AT HOME, OR GET ALONG WITH OTHER PEOPLE: NOT DIFFICULT AT ALL

## 2019-05-24 ASSESSMENT — PATIENT HEALTH QUESTIONNAIRE - PHQ9
SUM OF ALL RESPONSES TO PHQ QUESTIONS 1-9: 10
5. POOR APPETITE OR OVEREATING: NOT AT ALL

## 2019-05-24 ASSESSMENT — MIFFLIN-ST. JEOR: SCORE: 1451.47

## 2019-05-24 ASSESSMENT — PAIN SCALES - GENERAL: PAINLEVEL: EXTREME PAIN (8)
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
## 2019-05-24 NOTE — PATIENT INSTRUCTIONS
At [facility], we strive to deliver an exceptional experience to you, every time we see you.  If you receive a survey in the mail, please send us back your thoughts. We really do value your feedback.    Based on your medical history, these are the current health maintenance/preventive care services that you are due for (some may have been done at this visit.)  Health Maintenance Due   Topic Date Due     A1C  1950     BMP  1952     DEXA  1952     LIPID  1952     MICROALBUMIN  1952     TSH W/FREE T4 REFLEX  1952     DIABETIC FOOT EXAM  1952     ALLIE ASSESSMENT  1952     URINE DRUG SCREEN  1952     HEPATITIS C SCREENING  1952     ASTHMA ACTION PLAN  1952     ASTHMA CONTROL TEST  1952     DEPRESSION ACTION PLAN  1952     DIABETIC EYE EXAM  1952     FALL RISK ASSESSMENT  1952     MAMMO SCREENING  1952     PAP  1952     PHQ-9  1952     ZOSTER IMMUNIZATION (1 of 2) 05/29/2002     FIT  08/09/2015     MEDICARE ANNUAL WELLNESS VISIT  05/29/2017     PNEUMOCOCCAL IMMUNIZATION 65+ LOW/MEDIUM RISK (1 of 2 - PCV13) 05/29/2017         Suggested websites for health information:  Www.[place].org : Up to date and easily searchable information on multiple topics.  Www.medlineplus.gov : medication info, interactive tutorials, watch real surgeries online  Www.familydoctor.org : good info from the Academy of Family Physicians  Www.cdc.gov : public health info, travel advisories, epidemics (H1N1)  Www.aap.org : children's health info, normal development, vaccinations  Www.health.state.[place].us : [place] dept of health, public health issues in [place], N1N1    Your care team:                            Family Medicine Internal Medicine   MD Anthony Ojeda MD Shantel Branch-Fleming, MD Katya Georgiev PA-C Nam Ho, MD Pediatrics   ELIOT Rutledge, DARYN Elizabeth  MD Jolly Franco MD Deborah Mielke, MD Kim Thein, APRN CNP      Clinic hours: Monday - Thursday 7 am-7 pm; Fridays 7 am-5 pm.   Urgent care: Monday - Friday 11 am-9 pm; Saturday and Sunday 9 am-5 pm.  Pharmacy : Monday -Thursday 8 am-8 pm; Friday 8 am-6 pm; Saturday and Sunday 9 am-5 pm.     Clinic: [phone]   Pharmacy: [phone]      Patient Education   Personalized Prevention Plan  You are due for the preventive services outlined below.  Your care team is available to assist you in scheduling these services.  If you have already completed any of these items, please share that information with your care team to update in your medical record.  Health Maintenance Due   Topic Date Due     A1C Lab  1950     Basic Metabolic Panel  1952     Osteoporosis Screening  1952     Cholesterol Lab  1952     Kidney Function Urine Test  1952     Thyroid Function Lab  1952     Diabetic Foot Exam  1952     Anxiety Assessment  1952     URINE DRUG SCREEN  1952     Hepatitis C Screening  1952     Asthma Action Plan  1952     Asthma Control Test  1952     Depression Action Plan  1952     Eye Exam  1952     FALL RISK ASSESSMENT  1952     Mammogram  1952     PAP  1952     Depression Assessment  1952     Zoster (Shingles) Vaccine (1 of 2) 05/29/2002     FIT Test  08/09/2015     Annual Wellness Visit  05/29/2017     Pneumococcal Vaccine (1 of 2 - PCV13) 05/29/2017     Your Health Risk Assessment indicates you feel you are not in good health    A healthy lifestyle helps keep the body fit and the mind alert. It helps protect you from disease, helps you fight disease, and helps prevent chronic disease (disease that doesn't go away) from getting worse. This is important as you get older and begin to notice twinges in muscles and joints and a decline in the strength and stamina you once took for granted. A healthy  lifestyle includes good healthcare, good nutrition, weight control, recreation, and regular exercise. Avoid harmful substances and do what you can to keep safe. Another part of a healthy lifestyle is stay mentally active and socially involved.    Good healthcare     Have a wellness visit every year.     If you have new symptoms, let us know right away. Don't wait until the next checkup.     Take medicines exactly as prescribed and keep your medicines in a safe place. Tell us if your medicine causes problems.   Healthy diet and weight control     Eat 3 or 4 small, nutritious, low-fat, high-fiber meals a day. Include a variety of fruits, vegetables, and whole-grain foods.     Make sure you get enough calcium in your diet. Calcium, vitamin D, and exercise help prevent osteoporosis (bone thinning).     If you live alone, try eating with others when you can. That way you get a good meal and have company while you eat it.     Try to keep a healthy weight. If you eat more calories than your body uses for energy, it will be stored as fat and you will gain weight.     Recreation   Recreation is not limited to sports and team events. It includes any activity that provides relaxation, interest, enjoyment, and exercise. Recreation provides an outlet for physical, mental, and social energy. It can give a sense of worth and achievement. It can help you stay healthy.    Mental Exercise and Social Involvement  Mental and emotional health is as important as physical health. Keep in touch with friends and family. Stay as active as possible. Continue to learn and challenge yourself.   Things you can do to stay mentally active are:    Learn something new, like a foreign language or musical instrument.     Play SCRABBLE or do crossword puzzles. If you cannot find people to play these games with you at home, you can play them with others on your computer through the Internet.     Join a games club--anything from card games to chess or  checkers or lawn bowling.     Start a new hobby.     Go back to school.     Volunteer.     Read.   Keep up with world events.    Signs of Hearing Loss     Hearing much better with one ear can be a sign of hearing loss.     Hearing loss is a problem shared by many people. In fact, it is one of the most common health conditions, particularly as people age. Most people over age 65 have some hearing loss, and by age 80, almost everyone does. Because hearing loss usually occurs slowly over the years, you may not realize your hearing ability has gotten worse.  Have your hearing checked  Contact your healthcare provider if you:    Have to strain to hear normal conversation    Have to watch other people s faces very carefully to follow what they re saying    Need to ask people to repeat what they ve said    Often misunderstand what people are saying    Turn the volume of the television or radio up so high that others complain    Feel that people are mumbling when they re talking to you    Find that the effort to hear leaves you feeling tired and irritated    Notice, when using the phone, that you hear better with one ear than the other  Date Last Reviewed: 12/1/2016 2000-2018 [employer]. [address] [place] [address]. All rights reserved. This information is not intended as a substitute for professional medical care. Always follow your healthcare professional's instructions.        Your Health Risk Assessment indicates you feel you are not in good emotional health.    Recreation   Recreation is not limited to sports and team events. It includes any activity that provides relaxation, interest, enjoyment, and exercise. Recreation provides an outlet for physical, mental, and social energy. It can give a sense of worth and achievement. It can help you stay healthy.    Mental Exercise and Social Involvement  Mental and emotional health is as important as physical health. Keep in touch with friends  and family. Stay as active as possible. Continue to learn and challenge yourself.   Things you can do to stay mentally active are:    Learn something new, like a foreign language or musical instrument.     Play SCRABBLE or do crossword puzzles. If you cannot find people to play these games with you at home, you can play them with others on your computer through the Internet.     Join a games club--anything from card games to chess or checkers or lawn bowling.     Start a new hobby.     Go back to school.     Volunteer.     Read.   Keep up with world events.    Depression and Suicide in Older Adults    Nearly 2 million older Americans have some type of depression. Sadly, some of them even take their own lives. Yet depression among older adults is often ignored. Learn the warning signs. You may help spare a loved one needless pain. You may also save a life.  What is depression?  Depression is a serious illness that affects the way you think and feel. It is not a normal part of aging, nor is it a sign of weakness, a character flaw, or something you can snap out of. Most people with depression need treatment to get better. The most common symptom is a feeling of deep sadness. People who are depressed also may seem tired and listless. And nothing seems to give them pleasure. It s normal to grieve or be sad sometimes. But sadness lessens or passes with time. Depression rarely goes away or improves on its own. Other symptoms of depression are:    Sleeping more or less than normal    Eating more or less than normal    Having headaches, stomachaches, or other pains that don t go away    Feeling nervous,  empty,  or worthless    Crying a great deal    Thinking or talking about suicide or death    Feeling confused or forgetful  What causes it?  The causes of depression aren t fully known. But, it is thought to result from a complex interaction of biochemistry, genetics, environmental factors, and personality. Certain chemicals  in the brain play a role. Depression does run in families. And life stresses can also trigger depression in some people. Older adults often face many stressors, such as death of friends or a spouse, health problems, and financial concerns.  How you can help  Often, depressed people may not want to ask for help. When they do, they may be ignored. Or, they may receive the wrong treatment. You can help by showing parents and older friends love and support. If they seem depressed, help them find the right treatment. Talk to your doctor. Or contact a local mental health center, social service agency, or hospital. With modern treatment, no one has to suffer from depression.  If your older friend or family member agrees, you can be an advocate for him or her in the healthcare setting. Many times, older adults have other chronic illnesses such as diabetes, heart disease, or cancer that can cause symptoms of depression. Medicine side effects can also contribute to certain behaviors and feelings. It is important that the older adult's healthcare provider listens and sorts out the causes of any symptoms of depression and makes referrals to mental health specialists when needed. Untreated depression can result in misdiagnosis, including brain disorders such as dementia and Alzheimer's. If the health professional does not take the issue of depression seriously, ask your family member or friend to consider finding another provider.  Resources    National Suicide Prevention Lifeline (crisis hotline)549-662-KIWL (9833)    National [place] of Mental Vuqjrp428-522-0721xry.[facility] Hospital[facility].nih.gov    National [place] on Mental Svunhuy585-469-3926jol.juana.org    Mental Health Nngdyml632-889-0988ekn.[facility].org    National Suicide Gtyriye000-028-9990 (800-SUICIDE)   Date Last Reviewed: 2/1/2017 2000-2018 [employer]. [address] [place] [address], [place] [address]. All rights reserved. This information is not intended as a substitute for  professional medical care. Always follow your healthcare professional's instructions.          Preventing Falls in the Home  An adult or child can fall for many reasons. If you are an older adult, you may fall because your reaction time slows down. Your muscles and joints may get stiff, weak, or less flexible because of illness, medicines, or a physical condition. These things can also make a child more likely to fall or be injured in a fall.  Other health problems that make falls more likely include:    Arthritis    Dizziness or lightheadedness when you get out of bed (orthostatic hypotension)    History of a stroke    Dizziness    Anemia    Certain medicines taken for mental illness    Problems with balance or gait    History of falls with or without an injury    Changes in vision (vision impairment)    Changes in thinking skills and memory (cognitive impairment)  Injuries from a fall can include broken bones, dislocated joints, and cuts. When these injuries are serious enough, they can make it impossible for you or a child who is injured in a fall to live on his or her own.  Prevention tips  To help prevent falls and fall-related injuries, follow the tips below.   Floors  Make floors safer by doing the following:     Put nonskid pads under area rugs.    Remove throw rugs.    Replace worn floor coverings.    Tack carpets firmly to each step on carpeted stairs. Put nonskid strips on the edges of uncarpeted stairs.    Keep floors and stairs free of clutter and cords.    Arrange furniture so there are clear pathways.    Clean up any spills right away.    Wear shoes that fit.  Bathrooms    Make bathrooms safer by doing the following:     Install grab bars in the tub or shower.    Apply nonskid strips or put a nonskid rubber mat in the tub or shower.    Sit on a bath chair to bathe.    Use bathmats with nonskid backing.  Lighting and the environment  Improve lighting in your home by doing the following:     Keep a  flashlight in each room. Or put a lamp next to the bed within easy reach.    Put nightlights in the bedrooms, hallways, kitchen, and bathrooms.    Make sure all stairways have good lighting.    Take your time when going up and down stairs.    Put handrails on both sides of stairs and in walkways for more support. To prevent injury to your wrist or arm, don t use handrails to pull yourself up.    Install grab bars to pull yourself up.    Move or rearrange items that you use often. This will make them easier to find or reach.    Look at your home to find any safety hazards. Especially look at doorways, walkways, and the driveway. Remove or repair any safety problems that you find.  Date Last Reviewed: 8/1/2016 2000-2018 [employer]. [address] [place] [address], [place] [address]. All rights reserved. This information is not intended as a substitute for professional medical care. Always follow your healthcare professional's instructions.

## 2019-05-24 NOTE — LETTER
May 28, 2019      Evelyn Vargas  [address] [place] [address]        Dear Evelyn Vargas    Your test results are attached. I am happy to let you know that they are stable and your medications can stay the same.     The diabetes test is staying in good range. The kidney test is stable. The hemoglobin is normal and you do not have anemia. We can recheck labs in 6 months.       Please call me if you have any questions about these test results or about your care.      It was a pleasure to see you at your last appointment.      Sincerely,      Tere Camacho MD, MPH /ELIO Lynch MA      Results for orders placed or performed in visit on 05/24/19   Hemoglobin A1c   Result Value Ref Range    Hemoglobin A1C 6.6 (H) 0 - 5.6 %   CBC with platelets   Result Value Ref Range    WBC 13.1 (H) 4.0 - 11.0 10e9/L    RBC Count 4.75 3.8 - 5.2 10e12/L    Hemoglobin 13.9 11.7 - 15.7 g/dL    Hematocrit 43.2 35.0 - 47.0 %    MCV 91 78 - 100 fl    MCH 29.3 26.5 - 33.0 pg    MCHC 32.2 31.5 - 36.5 g/dL    RDW 13.9 10.0 - 15.0 %    Platelet Count 390 150 - 450 10e9/L   Renal panel   Result Value Ref Range    Sodium 139 133 - 144 mmol/L    Potassium 4.2 3.4 - 5.3 mmol/L    Chloride 105 94 - 109 mmol/L    Carbon Dioxide 25 20 - 32 mmol/L    Anion Gap 9 3 - 14 mmol/L    Glucose 136 (H) 70 - 99 mg/dL    Urea Nitrogen 12 7 - 30 mg/dL    Creatinine 1.06 (H) 0.52 - 1.04 mg/dL    GFR Estimate 54 (L) >60 mL/min/[1.73_m2]    GFR Estimate If Black 63 >60 mL/min/[1.73_m2]    Calcium 9.5 8.5 - 10.1 mg/dL    Phosphorus 3.8 2.5 - 4.5 mg/dL    Albumin 3.7 3.4 - 5.0 g/dL

## 2019-05-25 ASSESSMENT — ANXIETY QUESTIONNAIRES: GAD7 TOTAL SCORE: 4

## 2019-05-25 ASSESSMENT — ASTHMA QUESTIONNAIRES: ACT_TOTALSCORE: 12

## 2019-05-26 NOTE — RESULT ENCOUNTER NOTE
Dear Evelyn Vargas,    Your test results are attached. I am happy to let you know that they are stable and your medications can stay the same.    The diabetes test is staying in good range. The kidney test is stable. The hemoglobin is normal and you do not have anemia. We can recheck labs in 6 months.      Please call me if you have any questions about these test results or about your care.    Sincerely,    Tere Camacho MD

## 2019-05-28 ENCOUNTER — TELEPHONE (OUTPATIENT)
Dept: FAMILY MEDICINE | Facility: CLINIC | Age: 69
End: 2019-05-28

## 2019-05-28 NOTE — TELEPHONE ENCOUNTER
..Reason for Call:  Forms - FYI    Detailed comments: Patient calling to inform that the Social security ofice is faxing over her disability forms/she di sign an MOHINDER ;    Phone Number Patient can be reached at: Home number on file [phone] (home)    Best Time: any    Can we leave a detailed message on this number? YES    Call taken on 5/28/2019 at 9:27 AM by Anel Damico

## 2019-06-04 NOTE — TELEPHONE ENCOUNTER
Noted, then we already have a MOHINDER from social security scanned in patient's chart.  Kit Toussaint,  [facility] [employer] [facility] [employer]

## 2019-06-04 NOTE — TELEPHONE ENCOUNTER
This writer has not seen a social security form but a release of medical information to them.  Dr. Camacho could you check your incoming mail to see if you have seen this disability form come to you?  Kit Toussaint,  [employer] [facility] [facility]

## 2019-06-04 NOTE — TELEPHONE ENCOUNTER
They generally will look at the chart records for their disability assessment. The provider does not complete forms for social security. The release of information is all they need.  Tere Camacho MD

## 2019-06-26 DIAGNOSIS — Z12.11 SCREEN FOR COLON CANCER: ICD-10-CM

## 2019-06-26 LAB — HEMOCCULT STL QL IA: NORMAL

## 2019-06-28 ENCOUNTER — OFFICE VISIT (OUTPATIENT)
Dept: FAMILY MEDICINE | Facility: CLINIC | Age: 69
End: 2019-06-28
Payer: MEDICARE

## 2019-06-28 VITALS
HEART RATE: 89 BPM | OXYGEN SATURATION: 92 % | DIASTOLIC BLOOD PRESSURE: 86 MMHG | RESPIRATION RATE: 20 BRPM | HEIGHT: 66 IN | BODY MASS INDEX: 31.34 KG/M2 | TEMPERATURE: 97.9 F | WEIGHT: 195 LBS | SYSTOLIC BLOOD PRESSURE: 138 MMHG

## 2019-06-28 DIAGNOSIS — M54.42 CHRONIC BILATERAL LOW BACK PAIN WITH BILATERAL SCIATICA: ICD-10-CM

## 2019-06-28 DIAGNOSIS — E03.4 HYPOTHYROIDISM DUE TO ACQUIRED ATROPHY OF THYROID: ICD-10-CM

## 2019-06-28 DIAGNOSIS — M54.41 CHRONIC BILATERAL LOW BACK PAIN WITH BILATERAL SCIATICA: ICD-10-CM

## 2019-06-28 DIAGNOSIS — Z12.31 VISIT FOR SCREENING MAMMOGRAM: ICD-10-CM

## 2019-06-28 DIAGNOSIS — M62.830 MUSCLE SPASM OF BACK: ICD-10-CM

## 2019-06-28 DIAGNOSIS — G93.32 CHRONIC FATIGUE SYNDROME WITH FIBROMYALGIA: ICD-10-CM

## 2019-06-28 DIAGNOSIS — J45.31 MILD PERSISTENT ASTHMA WITH ACUTE EXACERBATION: ICD-10-CM

## 2019-06-28 DIAGNOSIS — H90.3 BILATERAL SENSORINEURAL HEARING LOSS: ICD-10-CM

## 2019-06-28 DIAGNOSIS — E11.22 TYPE 2 DIABETES MELLITUS WITH STAGE 3 CHRONIC KIDNEY DISEASE, WITHOUT LONG-TERM CURRENT USE OF INSULIN (H): Primary | ICD-10-CM

## 2019-06-28 DIAGNOSIS — M79.7 CHRONIC FATIGUE SYNDROME WITH FIBROMYALGIA: ICD-10-CM

## 2019-06-28 DIAGNOSIS — I10 ESSENTIAL HYPERTENSION WITH GOAL BLOOD PRESSURE LESS THAN 140/90: ICD-10-CM

## 2019-06-28 DIAGNOSIS — I10 HYPERTENSION, GOAL BELOW 140/90: ICD-10-CM

## 2019-06-28 DIAGNOSIS — E78.1 HYPERTRIGLYCERIDEMIA: ICD-10-CM

## 2019-06-28 DIAGNOSIS — N18.30 TYPE 2 DIABETES MELLITUS WITH STAGE 3 CHRONIC KIDNEY DISEASE, WITHOUT LONG-TERM CURRENT USE OF INSULIN (H): Primary | ICD-10-CM

## 2019-06-28 DIAGNOSIS — G89.29 CHRONIC BILATERAL LOW BACK PAIN WITH BILATERAL SCIATICA: ICD-10-CM

## 2019-06-28 PROBLEM — F31.60 BIPOLAR 1 DISORDER, MIXED (H): Status: ACTIVE | Noted: 2019-01-14

## 2019-06-28 LAB
CHOLEST SERPL-MCNC: 129 MG/DL
CREAT UR-MCNC: 109 MG/DL
HDLC SERPL-MCNC: 34 MG/DL
LDLC SERPL CALC-MCNC: 64 MG/DL
MICROALBUMIN UR-MCNC: 19 MG/L
MICROALBUMIN/CREAT UR: 17.61 MG/G CR (ref 0–25)
NONHDLC SERPL-MCNC: 95 MG/DL
TRIGL SERPL-MCNC: 157 MG/DL

## 2019-06-28 PROCEDURE — 82043 UR ALBUMIN QUANTITATIVE: CPT | Performed by: FAMILY MEDICINE

## 2019-06-28 PROCEDURE — 80061 LIPID PANEL: CPT | Performed by: FAMILY MEDICINE

## 2019-06-28 PROCEDURE — 99214 OFFICE O/P EST MOD 30 MIN: CPT | Performed by: FAMILY MEDICINE

## 2019-06-28 PROCEDURE — 36415 COLL VENOUS BLD VENIPUNCTURE: CPT | Performed by: FAMILY MEDICINE

## 2019-06-28 RX ORDER — LOSARTAN POTASSIUM 50 MG/1
50 TABLET ORAL DAILY
Qty: 90 TABLET | Refills: 3 | Status: SHIPPED | OUTPATIENT
Start: 2019-06-28 | End: 2020-01-23

## 2019-06-28 ASSESSMENT — MIFFLIN-ST. JEOR: SCORE: 1428.32

## 2019-06-28 ASSESSMENT — PAIN SCALES - GENERAL: PAINLEVEL: WORST PAIN (10)

## 2019-06-28 NOTE — PATIENT INSTRUCTIONS
At [facility], we strive to deliver an exceptional experience to you, every time we see you.  If you receive a survey in the mail, please send us back your thoughts. We really do value your feedback.    Based on your medical history, these are the current health maintenance/preventive care services that you are due for (some may have been done at this visit.)  Health Maintenance Due   Topic Date Due     URINE DRUG SCREEN  1950     EYE EXAM  1950     ZOSTER IMMUNIZATION (1 of 2) 05/29/2002     MICROALBUMIN  08/16/2014     ASTHMA ACTION PLAN  01/07/2017     MAMMO SCREENING  08/20/2018     LIPID  04/05/2019     ADVANCE CARE PLANNING  07/02/2019         Suggested websites for health information:  Www.[email or web address].[email or web address] : Up to date and easily searchable information on multiple topics.  Www.medlineplus.gov : medication info, interactive tutorials, watch real surgeries online  Www.familydoctor.org : good info from the Academy of Family Physicians  Www.cdc.gov : public health info, travel advisories, epidemics (H1N1)  Www.aap.org : children's health info, normal development, vaccinations  Www.health.state.[place].us : [place] dept of health, public health issues in [place], N1N1    Your care team:                            Family Medicine Internal Medicine   MD Anthony Ojeda MD Shantel Branch-Fleming, MD Katya Georgiev PA-C Nam Ho, MD Pediatrics   ELIOT Rutledge, MD Jolly Jiménez CNP, MD Deborah Mielke, MD Kim Thein, APRROD CNP      Clinic hours: Monday - Thursday 7 am-7 pm; Fridays 7 am-5 pm.   Urgent care: Monday - Friday 11 am-9 pm; Saturday and Sunday 9 am-5 pm.  Pharmacy : Monday -Thursday 8 am-8 pm; Friday 8 am-6 pm; Saturday and Sunday 9 am-5 pm.     Clinic: [phone]   Pharmacy: [phone]      Patient Education     Back Care Tips    Caring for your back  These are things you can do to prevent a  recurrence of acute back pain and to reduce symptoms from chronic back pain:    Maintain a healthy weight. If you are overweight, losing weight will help most types of back pain.    Exercise is an important part of recovery from most types of back pain. The muscles behind and in front of the spine support the back. This means strengthening both the back muscles and the abdominal muscles will provide better support for your spine.     Swimming and brisk walking are good overall exercises to improve your fitness level.    Practice safe lifting methods (below).    Practice good posture when sitting, standing and walking. Avoid prolonged sitting. This puts more stress on the lower back than standing or walking.    Wear quality shoes with sufficient arch support. Foot and ankle alignment can affect back symptoms. Women should avoid wearing high heels.    Therapeutic massage can help relax the back muscles without stretching them.    During the first 24 to 72 hours after an acute injury or flare-up of chronic back pain, apply an ice pack to the painful area for 20 minutes and then remove it for 20 minutes, over a period of 60 to 90 minutes, or several times a day. As a safety precaution, do not use a heating pad at bedtime. Sleeping on a heating pad can lead to skin burns or tissue damage.    You can alternate ice and heat therapies.  Medicines  Talk to your healthcare provider before using medicines, especially if you have other medical problems or are taking other medicines.    You may use acetaminophen or ibuprofen to control pain, unless your healthcare provider prescribed other pain medicine. If you have chronic conditions like diabetes, liver or kidney disease, stomach ulcers, or gastrointestinal bleeding, or are taking blood thinners, talk with your healthcare provider before taking any medicines.    Be careful if you are given prescription pain medicines, narcotics, or medicine for muscle spasm. They can cause  drowsiness, affect your coordination, reflexes, and judgment. Do not drive or operate heavy machinery while taking these types of medicines. Take prescription pain medicine only as prescribed by your healthcare provider.  Lumbar stretch  Here is a simple stretching exercise that will help relax muscle spasm and keep your back more limber. If exercise makes your back pain worse, don t do it.    Lie on your back with your knees bent and both feet on the ground.    Slowly raise your left knee to your chest as you flatten your lower back against the floor. Hold for 5 seconds.    Relax and repeat the exercise with your right knee.    Do 10 of these exercises for each leg.  Safe lifting method    Don t bend over at the waist to lift an object off the floor.  Instead, bend your knees and hips in a squat.     Keep your back and head upright    Hold the object close to your body, directly in front of you.    Straighten your legs to lift the object.     Lower the object to the floor in the reverse fashion.    If you must slide something across the floor, push it.  Posture tips  Sitting  Sit in chairs with straight backs or low-back support. Keep your knees lower than your hips, with your feet flat on the floor.  When driving, sit up straight. Adjust the seat forward so you are not leaning toward the steering wheel.  A small pillow or rolled towel behind your lower back may help if you are driving long distances.   Standing  When standing for long periods, shift most of your weight to one leg at a time. Alternate legs every few minutes.   Sleeping  The best way to sleep is on your side with your knees bent. Put a low pillow under your head to support your neck in a neutral spine position. Avoid thick pillows that bend your neck to one side. Put a pillow between your legs to further relax your lower back. If you sleep on your back, put pillows under your knees to support your legs in a slightly flexed position. Use a firm  mattress. If your mattress sags, replace it, or use a 1/2-inch plywood board under the mattress to add support.  Follow-up care  Follow up with your healthcare provider, or as advised.  If X-rays, a CT scan or an MRI scan were taken, they will be reviewed by a radiologist. You will be notified of any new findings that may affect your care.  Call 911  Call 911 if any of the following occur:    Trouble breathing    Confusion    Very drowsy    Fainting or loss of consciousness    Rapid or very slow heart rate    Loss of  bowel or bladder control  When to seek medical advice  Call your healthcare provider right away if any of the following occur:    Pain becomes worse or spreads to your arms or legs    Weakness or numbness in one or both arms or legs    Numbness in the groin area  Date Last Reviewed: 6/1/2016 2000-2018 [employer]. [address] [place] [address]. All rights reserved. This information is not intended as a substitute for professional medical care. Always follow your healthcare professional's instructions.
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
## 2019-06-28 NOTE — LETTER
July 1, 2019      Evelyn Vargas  [address] [place] [address]        Dear Evelyn Vargas,     Your test results are attached. I am happy to let you know that they are stable and your medications can stay the same.     The cholesterol test looks great. There is no protein in your urine. We can recheck labs in 6 months.     Please call me if you have any questions about these test results or about your care.     Sincerely,     Tere Camacho MD     Resulted Orders   Albumin Random Urine Quantitative with Creat Ratio   Result Value Ref Range    Creatinine Urine 109 mg/dL    Albumin Urine mg/L 19 mg/L    Albumin Urine mg/g Cr 17.61 0 - 25 mg/g Cr   Lipid panel reflex to direct LDL Non-fasting   Result Value Ref Range    Cholesterol 129 <200 mg/dL    Triglycerides 157 (H) <150 mg/dL      Comment:      Borderline high:  150-199 mg/dl  High:             200-499 mg/dl  Very high:       >499 mg/dl  Non Fasting      HDL Cholesterol 34 (L) >49 mg/dL    LDL Cholesterol Calculated 64 <100 mg/dL      Comment:      Desirable:       <100 mg/dl    Non HDL Cholesterol 95 <130 mg/dL

## 2019-07-01 ASSESSMENT — PATIENT HEALTH QUESTIONNAIRE - PHQ9: SUM OF ALL RESPONSES TO PHQ QUESTIONS 1-9: 7

## 2019-07-01 NOTE — RESULT ENCOUNTER NOTE
Dear Evelyn Vargas,    Your test results are attached. I am happy to let you know that they are stable and your medications can stay the same.    The cholesterol test looks great. There is no protein in your urine. We can recheck labs in 6 months.     Please call me if you have any questions about these test results or about your care.    Sincerely,    Tere Camacho MD

## 2019-07-02 ASSESSMENT — ASTHMA QUESTIONNAIRES: ACT_TOTALSCORE: 12

## 2019-07-17 DIAGNOSIS — G89.4 CHRONIC PAIN SYNDROME: ICD-10-CM

## 2019-07-17 NOTE — TELEPHONE ENCOUNTER
Requested Prescriptions   Pending Prescriptions Disp Refills     traMADol (ULTRAM) 50 MG tablet        Last Written Prescription Date:  05/24/19  Last Fill Quantity: 90,   # refills: 1  Last Office Visit: 06/28/19-Doug  Future Office visit:       Routing refill request to provider for review/approval because:  Drug not on the [facility], [facility] or [facility] refill protocol or controlled substance 90 tablet 1     Sig: Take 1 tablet (50 mg) by mouth every 8 hours as needed for moderate pain       There is no refill protocol information for this order

## 2019-07-18 RX ORDER — TRAMADOL HYDROCHLORIDE 50 MG/1
50 TABLET ORAL EVERY 8 HOURS PRN
Qty: 90 TABLET | Refills: 1 | Status: SHIPPED | OUTPATIENT
Start: 2019-07-18 | End: 2019-09-24

## 2019-07-18 NOTE — TELEPHONE ENCOUNTER
Routing refill request to provider for review/approval because:  Drug not on the [facility] refill protocol         Fredy Jean-Baptiste RN, BSN, PHN

## 2019-08-14 DIAGNOSIS — Z12.11 SCREEN FOR COLON CANCER: ICD-10-CM

## 2019-08-14 PROCEDURE — 82274 ASSAY TEST FOR BLOOD FECAL: CPT | Performed by: FAMILY MEDICINE

## 2019-08-14 NOTE — LETTER
August 26, 2019      Evelyn Vargas  [address]  [place] [address]        Evelyn,                  On behalf of Dr. Camacho, who is out of the office, I sent this letter.Your stool occult test is negative, which indicates low risk of colon cancer. No reason to undergo colonoscopy. Let's repeat this test next year. For any questions, you may call my office at [phone].     Sincerely,     Anthony Herring MD   Internal Medicine    Resulted Orders   Fecal colorectal cancer screen (FIT)   Result Value Ref Range    Occult Blood Scn FIT Negative NEG^Negative

## 2019-08-18 ENCOUNTER — APPOINTMENT (OUTPATIENT)
Dept: LAB | Facility: CLINIC | Age: 69
End: 2019-08-18
Payer: MEDICARE

## 2019-08-19 ENCOUNTER — DOCUMENTATION ONLY (OUTPATIENT)
Dept: FAMILY MEDICINE | Facility: CLINIC | Age: 69
End: 2019-08-19

## 2019-08-19 NOTE — PROGRESS NOTES
Patient needs new order placed in Epic for FIT([id number]). The sample is already in the lab.    Thank you

## 2019-08-23 ENCOUNTER — DOCUMENTATION ONLY (OUTPATIENT)
Dept: FAMILY MEDICINE | Facility: CLINIC | Age: 69
End: 2019-08-23

## 2019-08-23 DIAGNOSIS — Z12.11 SCREEN FOR COLON CANCER: Primary | ICD-10-CM

## 2019-08-23 LAB — HEMOCCULT STL QL IA: NEGATIVE

## 2019-08-23 NOTE — PROGRESS NOTES
Please place a future FIT order for Evelyn.     [facility] have the patients sample but no order in the computer. The patient did attempt this test on 19 but it was cancelled due to an  tube.     Thank you,  Lab

## 2019-10-17 ENCOUNTER — OFFICE VISIT (OUTPATIENT)
Dept: FAMILY MEDICINE | Facility: CLINIC | Age: 69
End: 2019-10-17
Payer: MEDICARE

## 2019-10-17 VITALS
WEIGHT: 189 LBS | OXYGEN SATURATION: 95 % | HEART RATE: 91 BPM | HEIGHT: 66 IN | SYSTOLIC BLOOD PRESSURE: 132 MMHG | DIASTOLIC BLOOD PRESSURE: 86 MMHG | BODY MASS INDEX: 30.37 KG/M2 | RESPIRATION RATE: 16 BRPM | TEMPERATURE: 98.5 F

## 2019-10-17 DIAGNOSIS — E53.8 VITAMIN B12 DEFICIENCY (NON ANEMIC): ICD-10-CM

## 2019-10-17 DIAGNOSIS — R01.1 HEART MURMUR: ICD-10-CM

## 2019-10-17 DIAGNOSIS — Z01.818 PREOP GENERAL PHYSICAL EXAM: Primary | ICD-10-CM

## 2019-10-17 DIAGNOSIS — G89.4 CHRONIC PAIN SYNDROME: ICD-10-CM

## 2019-10-17 DIAGNOSIS — E55.9 VITAMIN D DEFICIENCY: ICD-10-CM

## 2019-10-17 DIAGNOSIS — E11.22 TYPE 2 DIABETES MELLITUS WITH STAGE 3 CHRONIC KIDNEY DISEASE, WITHOUT LONG-TERM CURRENT USE OF INSULIN (H): ICD-10-CM

## 2019-10-17 DIAGNOSIS — G93.32 CHRONIC FATIGUE SYNDROME WITH FIBROMYALGIA: ICD-10-CM

## 2019-10-17 DIAGNOSIS — E53.8 VITAMIN B12 DEFICIENCY DISEASE: ICD-10-CM

## 2019-10-17 DIAGNOSIS — I10 HYPERTENSION, GOAL BELOW 140/90: ICD-10-CM

## 2019-10-17 DIAGNOSIS — N18.30 TYPE 2 DIABETES MELLITUS WITH STAGE 3 CHRONIC KIDNEY DISEASE, WITHOUT LONG-TERM CURRENT USE OF INSULIN (H): ICD-10-CM

## 2019-10-17 DIAGNOSIS — H25.9 SENILE CATARACT OF RIGHT EYE, UNSPECIFIED AGE-RELATED CATARACT TYPE: ICD-10-CM

## 2019-10-17 DIAGNOSIS — M79.7 CHRONIC FATIGUE SYNDROME WITH FIBROMYALGIA: ICD-10-CM

## 2019-10-17 DIAGNOSIS — E03.4 HYPOTHYROIDISM DUE TO ACQUIRED ATROPHY OF THYROID: ICD-10-CM

## 2019-10-17 LAB
ALBUMIN SERPL-MCNC: 3.8 G/DL (ref 3.4–5)
ALT SERPL W P-5'-P-CCNC: 43 U/L (ref 0–50)
ANION GAP SERPL CALCULATED.3IONS-SCNC: 7 MMOL/L (ref 3–14)
BUN SERPL-MCNC: 13 MG/DL (ref 7–30)
CALCIUM SERPL-MCNC: 9.9 MG/DL (ref 8.5–10.1)
CHLORIDE SERPL-SCNC: 107 MMOL/L (ref 94–109)
CO2 SERPL-SCNC: 27 MMOL/L (ref 20–32)
CREAT SERPL-MCNC: 0.95 MG/DL (ref 0.52–1.04)
ERYTHROCYTE [DISTWIDTH] IN BLOOD BY AUTOMATED COUNT: 14.3 % (ref 10–15)
FOLATE SERPL-MCNC: 6.5 NG/ML
GFR SERPL CREATININE-BSD FRML MDRD: 62 ML/MIN/{1.73_M2}
GLUCOSE SERPL-MCNC: 134 MG/DL (ref 70–99)
HBA1C MFR BLD: 6.1 % (ref 0–5.6)
HCT VFR BLD AUTO: 45.7 % (ref 35–47)
HGB BLD-MCNC: 14.5 G/DL (ref 11.7–15.7)
MAGNESIUM SERPL-MCNC: 1.7 MG/DL (ref 1.6–2.3)
MCH RBC QN AUTO: 29.8 PG (ref 26.5–33)
MCHC RBC AUTO-ENTMCNC: 31.7 G/DL (ref 31.5–36.5)
MCV RBC AUTO: 94 FL (ref 78–100)
PHOSPHATE SERPL-MCNC: 4 MG/DL (ref 2.5–4.5)
PLATELET # BLD AUTO: 336 10E9/L (ref 150–450)
POTASSIUM SERPL-SCNC: 4 MMOL/L (ref 3.4–5.3)
RBC # BLD AUTO: 4.87 10E12/L (ref 3.8–5.2)
SODIUM SERPL-SCNC: 141 MMOL/L (ref 133–144)
T4 FREE SERPL-MCNC: 0.93 NG/DL (ref 0.76–1.46)
TSH SERPL DL<=0.005 MIU/L-ACNC: 5.44 MU/L (ref 0.4–4)
VIT B12 SERPL-MCNC: 267 PG/ML (ref 193–986)
WBC # BLD AUTO: 10.1 10E9/L (ref 4–11)

## 2019-10-17 PROCEDURE — 82746 ASSAY OF FOLIC ACID SERUM: CPT | Performed by: FAMILY MEDICINE

## 2019-10-17 PROCEDURE — 84443 ASSAY THYROID STIM HORMONE: CPT | Performed by: FAMILY MEDICINE

## 2019-10-17 PROCEDURE — 84460 ALANINE AMINO (ALT) (SGPT): CPT | Performed by: FAMILY MEDICINE

## 2019-10-17 PROCEDURE — 99214 OFFICE O/P EST MOD 30 MIN: CPT | Mod: 25 | Performed by: FAMILY MEDICINE

## 2019-10-17 PROCEDURE — 80069 RENAL FUNCTION PANEL: CPT | Performed by: FAMILY MEDICINE

## 2019-10-17 PROCEDURE — 85027 COMPLETE CBC AUTOMATED: CPT | Performed by: FAMILY MEDICINE

## 2019-10-17 PROCEDURE — 36415 COLL VENOUS BLD VENIPUNCTURE: CPT | Performed by: FAMILY MEDICINE

## 2019-10-17 PROCEDURE — 82306 VITAMIN D 25 HYDROXY: CPT | Performed by: FAMILY MEDICINE

## 2019-10-17 PROCEDURE — 82607 VITAMIN B-12: CPT | Performed by: FAMILY MEDICINE

## 2019-10-17 PROCEDURE — 90662 IIV NO PRSV INCREASED AG IM: CPT | Performed by: FAMILY MEDICINE

## 2019-10-17 PROCEDURE — 83036 HEMOGLOBIN GLYCOSYLATED A1C: CPT | Performed by: FAMILY MEDICINE

## 2019-10-17 PROCEDURE — 84439 ASSAY OF FREE THYROXINE: CPT | Performed by: FAMILY MEDICINE

## 2019-10-17 PROCEDURE — G0008 ADMIN INFLUENZA VIRUS VAC: HCPCS | Performed by: FAMILY MEDICINE

## 2019-10-17 PROCEDURE — 83735 ASSAY OF MAGNESIUM: CPT | Performed by: FAMILY MEDICINE

## 2019-10-17 RX ORDER — TRAMADOL HYDROCHLORIDE 50 MG/1
TABLET ORAL
Qty: 90 TABLET | Refills: 0 | Status: SHIPPED | OUTPATIENT
Start: 2019-10-17 | End: 2019-11-18

## 2019-10-17 RX ORDER — CYANOCOBALAMIN 1000 UG/ML
1000 INJECTION, SOLUTION INTRAMUSCULAR; SUBCUTANEOUS
Qty: 1 ML | Refills: 11 | Status: SHIPPED | OUTPATIENT
Start: 2019-10-17 | End: 2020-11-23

## 2019-10-17 ASSESSMENT — MIFFLIN-ST. JEOR: SCORE: 1401.11

## 2019-10-17 ASSESSMENT — PAIN SCALES - GENERAL: PAINLEVEL: NO PAIN (0)
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
## 2019-10-18 ENCOUNTER — TELEPHONE (OUTPATIENT)
Dept: FAMILY MEDICINE | Facility: CLINIC | Age: 69
End: 2019-10-18

## 2019-10-18 LAB — DEPRECATED CALCIDIOL+CALCIFEROL SERPL-MC: 26 UG/L (ref 20–75)

## 2019-10-18 NOTE — RESULT ENCOUNTER NOTE
Please call patient with results (If unable to reach patient, this may be sent as a letter instead):    Dear Evelyn Vargas,     The B-12 is low again, so I will have you restart the monthly injections. When we recheck the levels, we need to make sure that it is not after you get one of the shots. This may artificially raise the level of B-12 above normal.     The diabetes test is in good range. The kidneys are healthy. The TSH is slightly elevated and we should recheck this in 3-4 months.       Tere Camacho MD

## 2019-10-18 NOTE — TELEPHONE ENCOUNTER
Team please send letter with results to patient.        This writer attempted to contact Evelyn on 10/18/19      Reason for call results and left message.      If patient calls back:   Registered Nurse called. Follow Triage Call workflow        Cristina Velasquez RN       ------    Notes recorded by Tere Camacho MD on 10/17/2019 at 9:19 PM CDT  Please call patient with results (If unable to reach patient, this may be sent as a letter instead):    Dear Evelyn Vargas,     The B-12 is low again, so I will have you restart the monthly injections. When we recheck the levels, we need to make sure that it is not after you get one of the shots. This may artificially raise the level of B-12 above normal.     The diabetes test is in good range. The kidneys are healthy. The TSH is slightly elevated and we should recheck this in 3-4 months.       Tere Camacho MD

## 2019-11-13 DIAGNOSIS — J45.30 MILD PERSISTENT ASTHMA WITHOUT COMPLICATION: ICD-10-CM

## 2019-11-13 NOTE — TELEPHONE ENCOUNTER
"Requested Prescriptions   Pending Prescriptions Disp Refills     albuterol (PROVENTIL) (2.5 MG/3ML) 0.083% neb solution  Last Written Prescription Date:  04/05/18  Last Fill Quantity: 360ml,  # refills: 11   Last Office Visit with [facility], [facility] or [facility] prescribing provider:  10/17/19Anmol   Future Office Visit:    360 mL 11     Sig: Take 1 vial (2.5 mg) by nebulization every 6 hours as needed       Asthma Maintenance Inhalers - Anticholinergics Failed - 11/13/2019  8:41 AM        Failed - Asthma control assessment score within normal limits in last 6 months     Please review ACT score.           Passed - Patient is age 12 years or older        Passed - Medication is active on med list        Passed - Recent (6 mo) or future (30 days) visit within the authorizing provider's specialty     Patient had office visit in the last 6 months or has a visit in the next 30 days with authorizing provider or within the authorizing provider's specialty.  See \"Patient Info\" tab in inbasket, or \"Choose Columns\" in Meds & Orders section of the refill encounter.            ACT Total Scores 10/4/2018 5/24/2019 7/1/2019   ACT TOTAL SCORE - - -   ASTHMA ER VISITS - - -   ASTHMA HOSPITALIZATIONS - - -   ACT TOTAL SCORE (Goal Greater than or Equal to 20) 23 12 12   In the past 12 months, how many times did you visit the emergency room for your asthma without being admitted to the hospital? 0 0 0   In the past 12 months, how many times were you hospitalized overnight because of your asthma? 0 0 0       "

## 2019-11-15 RX ORDER — ALBUTEROL SULFATE 0.83 MG/ML
2.5 SOLUTION RESPIRATORY (INHALATION) EVERY 6 HOURS PRN
Qty: 360 ML | Refills: 11 | Status: SHIPPED | OUTPATIENT
Start: 2019-11-15 | End: 2021-01-12

## 2019-11-15 NOTE — TELEPHONE ENCOUNTER
Routing refill request to provider for review/approval because:  ACT assessment not at goal for [facility] refill protocol      ACT and ATAQ Scores  8/18/2016   2/6/2017   5/15/2017   4/5/2018   10/4/2018   5/24/2019   7/1/2019    ACT TOTAL SCORE (Goal Greater than or Equal to 20) 23 18 20 13 23 12 12   ACT TOTAL SCORE - - - - - - -    8/18/2016 2/6/2017 5/15/2017 4/5/2018 10/4/2018 5/24/2019 7/1/2019         Georgia Hernandez RN

## 2019-11-18 ENCOUNTER — TELEPHONE (OUTPATIENT)
Dept: FAMILY MEDICINE | Facility: CLINIC | Age: 69
End: 2019-11-18

## 2019-11-18 DIAGNOSIS — G89.4 CHRONIC PAIN SYNDROME: ICD-10-CM

## 2019-11-19 RX ORDER — TRAMADOL HYDROCHLORIDE 50 MG/1
TABLET ORAL
Qty: 90 TABLET | Refills: 0 | Status: SHIPPED | OUTPATIENT
Start: 2019-11-19 | End: 2019-12-12

## 2019-11-19 NOTE — TELEPHONE ENCOUNTER
Requested Prescriptions   Pending Prescriptions Disp Refills     traMADol (ULTRAM) 50 MG tablet [Pharmacy Med Name: TRAMADOL HCL 50MG TABS] 90 tablet 0     Sig: TAKE ONE TABLET BY MOUTH EVERY 8 HOURS AS NEEDED FOR PAIN       There is no refill protocol information for this order        Last Written Prescription Date:  10/17/19  Last Fill Quantity: 90,  # refills: 0   Last Office Visit with [facility], [facility] or [facility] prescribing provider:  10/17/19   Future Office Visit:

## 2019-11-20 NOTE — TELEPHONE ENCOUNTER
.Reason for Call:  prescription    Detailed comments: tramadol//needs to pick this up tomorrow (11-21) - is trying to coordinate with picking up the rest of her medications as she takes a cab to the pharmacy;   Sohan [phone]  Phone Number Patient can be reached at: Home number on file [phone] (home)    Best Time: anytime    Can we leave a detailed message on this number? YES    Call taken on 11/20/2019 at 3:14 PM by Anel Damico

## 2019-11-21 NOTE — TELEPHONE ENCOUNTER
Called patient and informed Rx approved already, should be available for  at pharmacy.    Giuliano Sapp CMA

## 2019-12-12 DIAGNOSIS — G89.4 CHRONIC PAIN SYNDROME: ICD-10-CM

## 2019-12-12 RX ORDER — TRAMADOL HYDROCHLORIDE 50 MG/1
TABLET ORAL
Qty: 90 TABLET | Refills: 0 | Status: SHIPPED | OUTPATIENT
Start: 2019-12-12 | End: 2020-01-20

## 2019-12-12 NOTE — TELEPHONE ENCOUNTER
Requested Prescriptions   Pending Prescriptions Disp Refills     traMADol (ULTRAM) 50 MG tablet [Pharmacy Med Name: TRAMADOL HCL 50MG TABS]        Last Written Prescription Date:  11/19/19  Last Fill Quantity: 90,   # refills: 0  Last Office Visit: 10/17/19-Doug  Future Office visit:       Routing refill request to provider for review/approval because:  Drug not on the [facility], P or  Health refill protocol or controlled substance 90 tablet 0     Sig: TAKE ONE TABLET BY MOUTH EVERY 8 HOURS AS NEEDED FOR PAIN       There is no refill protocol information for this order

## 2019-12-23 DIAGNOSIS — M62.830 MUSCLE SPASM OF BACK: ICD-10-CM

## 2020-01-03 ENCOUNTER — TRANSFERRED RECORDS (OUTPATIENT)
Dept: HEALTH INFORMATION MANAGEMENT | Facility: CLINIC | Age: 70
End: 2020-01-03

## 2020-01-03 LAB — RETINOPATHY: NORMAL

## 2020-01-16 DIAGNOSIS — G89.4 CHRONIC PAIN SYNDROME: ICD-10-CM

## 2020-01-16 NOTE — TELEPHONE ENCOUNTER
Requested Prescriptions   Pending Prescriptions Disp Refills     traMADol (ULTRAM) 50 MG tablet [Pharmacy Med Name: TRAMADOL HCL 50MG TABS]        Last Written Prescription Date:  12/12/19  Last Fill Quantity: 90,   # refills: 0  Last Office Visit: 10/17/19-Doug  Future Office visit:    Next 5 appointments (look out 90 days)    Jan 23, 2020  2:20 PM CST  [facility] Visit with Tere Camacho [facility] [address] [place] [id number] [phone]           Routing refill request to provider for review/approval because:  Drug not on the [facility], [facility] or  Health refill protocol or controlled substance 90 tablet 0     Sig: TAKE ONE TABLET BY MOUTH EVERY 8 HOURS AS NEEDED FOR PAIN       There is no refill protocol information for this order

## 2020-01-20 RX ORDER — TRAMADOL HYDROCHLORIDE 50 MG/1
TABLET ORAL
Qty: 90 TABLET | Refills: 0 | Status: SHIPPED | OUTPATIENT
Start: 2020-01-20 | End: 2020-02-17

## 2020-01-23 ENCOUNTER — OFFICE VISIT (OUTPATIENT)
Dept: FAMILY MEDICINE | Facility: CLINIC | Age: 70
End: 2020-01-23
Payer: MEDICARE

## 2020-01-23 VITALS
BODY MASS INDEX: 30.67 KG/M2 | WEIGHT: 190.8 LBS | RESPIRATION RATE: 18 BRPM | HEART RATE: 95 BPM | HEIGHT: 66 IN | TEMPERATURE: 98.4 F | OXYGEN SATURATION: 94 % | DIASTOLIC BLOOD PRESSURE: 98 MMHG | SYSTOLIC BLOOD PRESSURE: 155 MMHG

## 2020-01-23 DIAGNOSIS — E11.22 TYPE 2 DIABETES MELLITUS WITH STAGE 3 CHRONIC KIDNEY DISEASE, WITHOUT LONG-TERM CURRENT USE OF INSULIN (H): Primary | ICD-10-CM

## 2020-01-23 DIAGNOSIS — M79.7 CHRONIC FATIGUE SYNDROME WITH FIBROMYALGIA: ICD-10-CM

## 2020-01-23 DIAGNOSIS — N18.30 CKD (CHRONIC KIDNEY DISEASE) STAGE 3, GFR 30-59 ML/MIN (H): ICD-10-CM

## 2020-01-23 DIAGNOSIS — G89.4 CHRONIC PAIN SYNDROME: ICD-10-CM

## 2020-01-23 DIAGNOSIS — E55.9 VITAMIN D DEFICIENCY: ICD-10-CM

## 2020-01-23 DIAGNOSIS — J45.31 MILD PERSISTENT ASTHMA WITH ACUTE EXACERBATION: ICD-10-CM

## 2020-01-23 DIAGNOSIS — I10 ESSENTIAL HYPERTENSION WITH GOAL BLOOD PRESSURE LESS THAN 140/90: ICD-10-CM

## 2020-01-23 DIAGNOSIS — I10 HYPERTENSION, GOAL BELOW 140/90: ICD-10-CM

## 2020-01-23 DIAGNOSIS — N18.30 TYPE 2 DIABETES MELLITUS WITH STAGE 3 CHRONIC KIDNEY DISEASE, WITHOUT LONG-TERM CURRENT USE OF INSULIN (H): Primary | ICD-10-CM

## 2020-01-23 DIAGNOSIS — E03.4 HYPOTHYROIDISM DUE TO ACQUIRED ATROPHY OF THYROID: ICD-10-CM

## 2020-01-23 DIAGNOSIS — Z12.39 SCREENING FOR BREAST CANCER: ICD-10-CM

## 2020-01-23 DIAGNOSIS — R60.9 DEPENDENT EDEMA: ICD-10-CM

## 2020-01-23 DIAGNOSIS — F33.41 RECURRENT MAJOR DEPRESSIVE DISORDER, IN PARTIAL REMISSION (H): ICD-10-CM

## 2020-01-23 DIAGNOSIS — G93.32 CHRONIC FATIGUE SYNDROME WITH FIBROMYALGIA: ICD-10-CM

## 2020-01-23 DIAGNOSIS — Z12.31 ENCOUNTER FOR SCREENING MAMMOGRAM FOR MALIGNANT NEOPLASM OF BREAST: ICD-10-CM

## 2020-01-23 DIAGNOSIS — F41.1 GENERALIZED ANXIETY DISORDER: ICD-10-CM

## 2020-01-23 LAB
GLUCOSE SERPL-MCNC: 139 MG/DL (ref 70–99)
HBA1C MFR BLD: 6.4 % (ref 0–5.6)

## 2020-01-23 PROCEDURE — 99214 OFFICE O/P EST MOD 30 MIN: CPT | Performed by: FAMILY MEDICINE

## 2020-01-23 PROCEDURE — 83036 HEMOGLOBIN GLYCOSYLATED A1C: CPT | Performed by: FAMILY MEDICINE

## 2020-01-23 PROCEDURE — 82947 ASSAY GLUCOSE BLOOD QUANT: CPT | Performed by: FAMILY MEDICINE

## 2020-01-23 PROCEDURE — 36415 COLL VENOUS BLD VENIPUNCTURE: CPT | Performed by: FAMILY MEDICINE

## 2020-01-23 RX ORDER — CHOLECALCIFEROL (VITAMIN D3) 50 MCG
2000 TABLET ORAL DAILY
Qty: 100 TABLET | Refills: 3 | Status: SHIPPED | OUTPATIENT
Start: 2020-01-23

## 2020-01-23 RX ORDER — LOSARTAN POTASSIUM 100 MG/1
100 TABLET ORAL DAILY
Qty: 90 TABLET | Refills: 3 | Status: SHIPPED | OUTPATIENT
Start: 2020-01-23 | End: 2020-05-28

## 2020-01-23 ASSESSMENT — MIFFLIN-ST. JEOR: SCORE: 1409.27

## 2020-01-23 ASSESSMENT — PAIN SCALES - GENERAL: PAINLEVEL: NO PAIN (0)

## 2020-01-23 NOTE — PROGRESS NOTES
Subjective     Evelyn Vargas is a 69 year old female who presents to clinic today for the following health issues:    HPI   Diabetes Follow-up      How often are you checking your blood sugar? Not at all    What concerns do you have today about your diabetes? None     Do you have any of these symptoms? (Select all that apply)  Burning in feet    Have you had a diabetic eye exam in the last 12 months? Yes-  Location: [facility]- 1-3-2020                Hyperlipidemia Follow-Up      Are you regularly taking any medication or supplement to lower your cholesterol?   No    Are you having muscle aches or other side effects that you think could be caused by your cholesterol lowering medication?  No    Hypertension Follow-up      Do you check your blood pressure regularly outside of the clinic? No     Are you following a low salt diet? Yes    Are your blood pressures ever more than 140 on the top number (systolic) OR more   than 90 on the bottom number (diastolic), for example 140/90? No    BP Readings from Last 2 Encounters:   01/23/20 (!) 155/98   10/17/19 132/86     Hemoglobin A1C (%)   Date Value   10/17/2019 6.1 (H)   05/24/2019 6.6 (H)     LDL Cholesterol Calculated (mg/dL)   Date Value   06/28/2019 64   04/05/2018 76       Depression and Anxiety Follow-Up    How are you doing with your depression since your last visit? No change    How are you doing with your anxiety since your last visit?  No change    Are you having other symptoms that might be associated with depression or anxiety? No    Have you had a significant life event? Relationship Concerns and Health Concerns     Do you have any concerns with your use of alcohol or other drugs? No    Social History     Tobacco Use     Smoking status: Never Smoker     Smokeless tobacco: Never Used   Substance Use Topics     Alcohol use: No     Drug use: No     PHQ 10/4/2018 5/24/2019 7/1/2019   PHQ-9 Total Score 14 10 7   Q9: Thoughts of better off  dead/self-harm past 2 weeks Not at all Not at all Not at all     ALLIE-7 SCORE 2/6/2017 10/4/2018 5/24/2019   Total Score - - -   Total Score 14 1 4           Suicide Assessment Five-step Evaluation and Treatment (SAFE-T)    Asthma Follow-Up    Was ACT completed today?    Yes    ACT Total Scores 7/1/2019   ACT TOTAL SCORE -   ASTHMA ER VISITS -   ASTHMA HOSPITALIZATIONS -   ACT TOTAL SCORE (Goal Greater than or Equal to 20) 12   In the past 12 months, how many times did you visit the emergency room for your asthma without being admitted to the hospital? 0   In the past 12 months, how many times were you hospitalized overnight because of your asthma? 0       How many days per week do you miss taking your asthma controller medication?  I do not have an asthma controller medication    Please describe any recent triggers for your asthma: None    Have you had any Emergency Room Visits, Urgent Care Visits, or Hospital Admissions since your last office visit?  No    Chronic Kidney Disease Follow-up      Do you take any over the counter pain medicine?: No    Chronic Pain Follow-Up       Type / Location of Pain: legs, low back  Analgesia/pain control:       Recent changes:  same      Overall control: Tolerable with discomfort  Activity level/function:      Daily activities:  Able to do light housework, cooking    Work:  not applicable  Adverse effects:  No  Adherance    Taking medication as directed?  Yes    Participating in other treatments: yes  Risk Factors:    Sleep:  Fair    Mood/anxiety:  controlled    Recent family or social stressors:  none noted    Other aggravating factors: none  PHQ-9 SCORE 10/4/2018 5/24/2019 7/1/2019   PHQ-9 Total Score - - -   PHQ-9 Total Score 14 10 7     ALLIE-7 SCORE 2/6/2017 10/4/2018 5/24/2019   Total Score - - -   Total Score 14 1 4     Encounter-Level CSA:    There are no encounter-level csa.     Patient-Level CSA:    There are no patient-level csa.         How many servings of fruits and  vegetables do you eat daily?  2-3    On average, how many sweetened beverages do you drink each day (Examples: soda, juice, sweet tea, etc.  Do NOT count diet or artificially sweetened beverages)?   0    How many days per week do you exercise enough to make your heart beat faster? 4    How many minutes a day do you exercise enough to make your heart beat faster? 10 - 19    How many days per week do you miss taking your medication? 0        Patient Active Problem List   Diagnosis     Chronic pain syndrome     Mild persistent asthma     Vitamin B12 deficiency disease     Hypothyroidism     Obesity     Hypertension, goal below 140/90     Advanced directives, counseling/discussion     Generalized anxiety disorder     Hypertriglyceridemia     Opiate or related narcotic overdose, accidental or unintentional, subsequent encounter     Persistent insomnia     Vision loss of right eye     Chronic fatigue fibromyalgia syndrome     Type 2 diabetes mellitus with stage 3 chronic kidney disease, without long-term current use of insulin (H)     CKD (chronic kidney disease) stage 3, GFR 30-59 ml/min (H)     Systolic ejection murmur     Hx of adjustment reaction due to domestic issues     Vitamin D deficiency     Heart murmur     History reviewed. No pertinent surgical history.    Social History     Tobacco Use     Smoking status: Never Smoker     Smokeless tobacco: Never Used   Substance Use Topics     Alcohol use: No     Family History   Family history unknown: Yes         Current Outpatient Medications   Medication Sig Dispense Refill     albuterol (PROVENTIL) (2.5 MG/3ML) 0.083% neb solution Take 1 vial (2.5 mg) by nebulization every 6 hours as needed 360 mL 11     amitriptyline (ELAVIL) 100 MG tablet Take 1 tablet (100 mg) by mouth At Bedtime 90 tablet 3     amitriptyline (ELAVIL) 50 MG tablet Take 1 tablet (50 mg) by mouth At Bedtime 90 tablet 3     Cholecalciferol (VITAMIN D) 2000 UNITS tablet Take 2,000 Units by mouth daily  100 tablet 3     cloNIDine (CATAPRES) 0.2 MG tablet Take 1 tablet (0.2 mg) by mouth 3 times daily 270 tablet 3     cyanocobalamin (CYANOCOBALAMIN) 1000 MCG/ML injection Inject 1 mL (1,000 mcg) into the muscle every 30 days 1 mL 11     ferrous sulfate (IRON) 325 (65 FE) MG tablet Take 1 tablet (325 mg) by mouth 2 times daily 60 tablet 11     gabapentin (NEURONTIN) 300 MG capsule Take 1 capsule (300 mg) by mouth 2 times daily Arthritis, non-child proof caps 180 capsule 3     hydrOXYzine (ATARAX) 25 MG tablet Take 2 tablets (50 mg) by mouth nightly as needed for other (sleep) 60 tablet 6     losartan (COZAAR) 50 MG tablet Take 1 tablet (50 mg) by mouth daily 90 tablet 3     order for DME Equipment being ordered: 6 pairs of Core sport knee highs large, for diabetes. 6 Device 0     STATIN NOT PRESCRIBED (INTENTIONAL) 1 each daily Please choose reason not prescribed, below  1     tiZANidine (ZANAFLEX) 4 MG tablet TAKE 1 TABLET BY MOUTH 3 TIMES DAILY AS NEEDED FOR MUSCLE SPASMS. 270 tablet 1     traMADol (ULTRAM) 50 MG tablet TAKE ONE TABLET BY MOUTH EVERY 8 HOURS AS NEEDED FOR PAIN 90 tablet 0     VENTOLIN  (90 Base) MCG/ACT inhaler INHALE 2 PUFFS EVERY 6 HOURS AS NEEDED SHORTNESS OF BREATH 18 g 3     Allergies   Allergen Reactions     Acetaminophen      Other reaction(s): Vomiting     Morphine      Other reaction(s): GI Problems/ Nausea     Tylenol      Recent Labs   Lab Test 10/17/19  1013 06/28/19  1550 05/24/19  1657 10/04/18  1358 04/05/18  1134 02/06/17  1105  05/27/14  1701   A1C 6.1*  --  6.6* 6.6* 6.6* 5.3   < > 5.8   LDL  --  64  --   --  76 116*  --  110   HDL  --  34*  --   --  64 75  --  40*   TRIG  --  157*  --   --  151*  --   --  196*   ALT 43  --   --   --  50 33   < > 24   CR 0.95  --  1.06* 0.84 0.94 0.99   < > 1.10*   GFRESTIMATED 62  --  54* 68 59* 56*   < > 50*   GFRESTBLACK 71  --  63 82 72 68   < > 61   POTASSIUM 4.0  --  4.2 3.9 3.7 4.3   < > 3.6   TSH 5.44*  --   --  3.60 2.80 3.85   < >  "0.68    < > = values in this interval not displayed.      BP Readings from Last 3 Encounters:   01/23/20 (!) 155/98   10/17/19 132/86   06/28/19 138/86    Wt Readings from Last 3 Encounters:   01/23/20 86.5 kg (190 lb 12.8 oz)   10/17/19 85.7 kg (189 lb)   06/28/19 88.5 kg (195 lb)                      Reviewed and updated as needed this visit by Provider  Meds  Problems         Review of Systems   ROS COMP: Constitutional, HEENT, cardiovascular, pulmonary, gi and gu systems are negative, except as otherwise noted.      Objective    BP (!) 155/98 (BP Location: Right arm, Patient Position: Sitting, Cuff Size: Adult Regular)   Pulse 95   Temp 98.4  F (36.9  C) (Oral)   Resp 18   Ht 1.664 m (5' 5.5\")   Wt 86.5 kg (190 lb 12.8 oz)   LMP  (LMP Unknown)   SpO2 94%   Breastfeeding No   BMI 31.27 kg/m    Body mass index is 31.27 kg/m .  Physical Exam   GENERAL: healthy, alert, well nourished, well hydrated, no distress, obese  HENT: ear canals- normal; TMs- normal; Nose- normal; Mouth- no ulcers, no lesions, throat is clear with no erythema or exudate.   NECK: no tenderness, no adenopathy, no asymmetry, no masses, no stiffness; thyroid- normal to palpation  RESP: lungs clear to auscultation - no rales, no rhonchi, no wheezes  CV: regular rates and rhythm, normal S1 S2, no S3 or S4 and no murmur, no click or rub -  ABDOMEN: soft, no tenderness, no  hepatosplenomegaly, no masses, normal bowel sounds  MS: extremities- no gross deformities noted, no edema  SKIN: no suspicious lesions, no rashes  NEURO: strength and tone- normal, sensory exam- grossly normal, mentation- intact, speech- normal, reflexes- symmetric  BACK: no CVA tenderness, no paralumbar tenderness  PSYCH: Alert and oriented times 3; speech- coherent , normal rate and volume; able to articulate logical thoughts, able to abstract reason, no tangential thoughts, no hallucinations or delusions, affect- normal for patient.     Diagnostic Test Results:  Labs " "reviewed in Epic  Results for orders placed or performed in visit on 01/23/20 (from the past 24 hour(s))   Glucose   Result Value Ref Range    Glucose 139 (H) 70 - 99 mg/dL           Assessment & Plan       ICD-10-CM    1. Type 2 diabetes mellitus with stage 3 chronic kidney disease, without long-term current use of insulin (H)- well controlled on medications  E11.22 Glucose    N18.3 Hemoglobin A1c   2. Hypothyroidism due to acquired atrophy of thyroid E03.4 Stable    3. CKD (chronic kidney disease) stage 3, GFR 30-59 ml/min (H) N18.3 Improved and will continue to monitor every 6 months   4. Hypertension, goal below 140/90 I10 Blood pressure elevated today and recommend increased dose of losartan. 100 mg a day. Recheck blood pressure in 1 month on ancillary schedule.    5. Mild persistent asthma with acute exacerbation J45.31 Patient feels that it is well controlled but her ACT is only 17.    6. Chronic fatigue fibromyalgia syndrome R53.82 Doing well with amitriptyline at night.      M79.7    7. Chronic pain syndrome G89.4 Stable and off narcotics except Tramadol   8. Generalized anxiety disorder F41.1 Stable    9. Screening for breast cancer Z12.39 MA SCREENING DIGITAL BILAT - Future  (s+30)   10. Dependent edema R60.9 Compression Sleeve/Stocking Order for DME - ONLY FOR DME   11. Encounter for screening mammogram for malignant neoplasm of breast  Z12.31 MA SCREENING DIGITAL BILAT - Future  (s+30)   12. Recurrent major depressive disorder, in partial remission (H) F33.41 Doing well on medications. Always family issues, but these are pretty much normal for her   13. Essential hypertension with goal blood pressure less than 140/90 I10 losartan (COZAAR) 100 MG tablet   14. Vitamin D deficiency E55.9 vitamin D3 (CHOLECALCIFEROL) 2000 units (50 mcg) tablet        BMI:   Estimated body mass index is 31.27 kg/m  as calculated from the following:    Height as of this encounter: 1.664 m (5' 5.5\").    Weight as of this " encounter: 86.5 kg (190 lb 12.8 oz).   Weight management plan: Discussed healthy diet and exercise guidelines        FUTURE LABS:       - Schedule fasting labs in 3 months  FUTURE APPOINTMENTS:       - Follow-up visit in 3 months or sooner if any questions or concerns.   Work on weight loss  Regular exercise  See Patient Instructions    No follow-ups on file.    Tere Camacho MD  [facility]

## 2020-01-23 NOTE — LETTER
January 27, 2020      Evelyn Vargas  [address] [place] [address]            Dear Evelyn Vargas,     Your test results are attached. I am happy to let you know that they are stable and your medications can stay the same.     Please call me if you have any questions about these test results or about your care. Enclosed are the results.  Results for orders placed or performed in visit on 01/23/20   Glucose     Status: Abnormal   Result Value Ref Range    Glucose 139 (H) 70 - 99 mg/dL   Hemoglobin A1c     Status: Abnormal   Result Value Ref Range    Hemoglobin A1C 6.4 (H) 0 - 5.6 %       Sincerely,     Tere Camacho MD

## 2020-01-23 NOTE — PATIENT INSTRUCTIONS
Patient Education     Myofascial Pain Syndrome  Your pain is caused by a state of chronic muscle tension. This condition is called by various names: myofascial pain, fibrositis, and trigger point pain. This can also be due to mechanical stress, such as working at a computer terminal for long periods or work that requires repetitive motions of the arms or hands. It can also be caused by emotional stress, such as problems on the job or in your personal life. Sometimes there is no obvious cause. The pain can happen in the area of the muscle spasm or at a site distant to it. For example, spasm of a neck muscle can cause headache. Spasm of the muscle near the shoulder blade can cause pain shooting down the arm.  Home care    Try to identify the factors that may be causing your problem and change them:  ? If you feel that emotional stress is a cause of your pain, learn methods to better deal with the stress in your life. These may include regular exercise, muscle relaxation techniques, meditation, or simply taking time out for yourself. Talk with your healthcare provider or go to a local bookstore and review the many books and tapes about reducing stress.  ? If you feel that physical stress is a cause for your pain, try to change any poor work habits.    You may use over-the-counter pain medicine to control pain, unless another medicine was prescribed. If you have chronic liver or kidney disease or ever had a stomach ulcer or gastrointestinal bleeding, talk with your healthcare provider before using these medicines.    Apply an ice pack over the injured area for 15 to 20 minutes every 3 to 6 hours. You should do this for the first 24 to 48 hours. You can make an ice pack by filling a plastic bag that seals at the top with ice cubes and then wrapping it with a thin towel. Be careful not to injure your skin with the ice treatments. Ice should never be applied directly to skin. Continue the use of ice packs for relief of  pain and swelling as needed. After 48 hours, apply heat (warm shower or warm bath) for 15 to 20 minutes several times a day, or alternate ice and heat.    Massage the trigger point and stretch out the muscle. Trigger point massage can be done by applying heat to the area to warm and prepare the muscle. Then have someone apply steady thumb pressure directly on the knot in the muscle for 30 seconds. Release the pressure, then massage the surrounding muscle. Repeat the process, applying more pressure to the trigger point each time. Do this up to the limit of pain. With each treatment, the trigger point should become less tender and the pain should decrease. You can apply local pressure to trigger points in the back by lying on the floor with a tennis ball under the trigger point.  Follow-up care  Follow up with your healthcare provider, or as advised. You may need physical therapy if you don t respond to home treatment alone.  Call 911  Call 911 if you have:    A trigger point in the chest muscles, pain that becomes more severe, lasts longer, or spreads into your shoulder, arm, or jaw    Chest pain or discomfort    Trouble breathing with or without chest discomfort     Sweating, lightheadedness, nausea, or vomiting along with chest discomfort    Sudden weakness or numbness in the arm, leg, or face, especially if this happens on one side of the body  When to seek medical advice  Call your healthcare provider right away if any of these occur:    A week passes and you have not improved    If your pain worsens, regardless of its location  Date Last Reviewed: 5/1/2018 2000-2019 [employer]. [address] [place] [address], [place] [address]. All rights reserved. This information is not intended as a substitute for professional medical care. Always follow your healthcare professional's instructions.

## 2020-01-24 ASSESSMENT — ASTHMA QUESTIONNAIRES: ACT_TOTALSCORE: 17

## 2020-01-24 NOTE — RESULT ENCOUNTER NOTE
Dear Evelyn Vargas,    Your test results are attached. I am happy to let you know that they are stable and your medications can stay the same.    Please call me if you have any questions about these test results or about your care.    Sincerely,    Tere Camacho MD

## 2020-02-17 DIAGNOSIS — G89.4 CHRONIC PAIN SYNDROME: ICD-10-CM

## 2020-02-17 RX ORDER — TRAMADOL HYDROCHLORIDE 50 MG/1
TABLET ORAL
Qty: 90 TABLET | Refills: 0 | Status: SHIPPED | OUTPATIENT
Start: 2020-02-17 | End: 2020-03-09

## 2020-03-05 DIAGNOSIS — J45.30 MILD PERSISTENT ASTHMA WITHOUT COMPLICATION: ICD-10-CM

## 2020-03-05 NOTE — TELEPHONE ENCOUNTER
"Requested Prescriptions   Pending Prescriptions Disp Refills     VENTOLIN  (90 Base) MCG/ACT inhaler [Pharmacy Med Name: VENTOLIN HFA 108MCG/ACT AER]  Last Written Prescription Date:  05/14/19  Last Fill Quantity: 18g,  # refills: 3   Last Office Visit with [facility] or [facility] prescribing provider:  01/23/2020-Doug   Future Office Visit:    18 g 3     Sig: INHALE 2 PUFFS EVERY 6 HOURS AS NEEDED FOR SHORTNESS OF BREATH       Asthma Maintenance Inhalers - Anticholinergics Failed - 3/5/2020 12:19 PM        Failed - Asthma control assessment score within normal limits in last 6 months     Please review ACT score.           Passed - Patient is age 12 years or older        Passed - Medication is active on med list        Passed - Recent (6 mo) or future (30 days) visit within the authorizing provider's specialty     Patient had office visit in the last 6 months or has a visit in the next 30 days with authorizing provider or within the authorizing provider's specialty.  See \"Patient Info\" tab in inbasket, or \"Choose Columns\" in Meds & Orders section of the refill encounter.              "

## 2020-03-09 DIAGNOSIS — G89.4 CHRONIC PAIN SYNDROME: ICD-10-CM

## 2020-03-09 RX ORDER — ALBUTEROL SULFATE 90 UG/1
AEROSOL, METERED RESPIRATORY (INHALATION)
Qty: 18 G | Refills: 3 | Status: SHIPPED | OUTPATIENT
Start: 2020-03-09 | End: 2021-06-25

## 2020-03-09 RX ORDER — TRAMADOL HYDROCHLORIDE 50 MG/1
TABLET ORAL
Qty: 90 TABLET | Refills: 0 | Status: SHIPPED | OUTPATIENT
Start: 2020-03-09 | End: 2020-04-10

## 2020-03-09 NOTE — TELEPHONE ENCOUNTER
Routing refill request to provider for review/approval because:       Asthma control assessment score not within normal limits in last 6 months       Cristina Velasquez RN

## 2020-04-10 DIAGNOSIS — G89.4 CHRONIC PAIN SYNDROME: ICD-10-CM

## 2020-04-10 RX ORDER — TRAMADOL HYDROCHLORIDE 50 MG/1
TABLET ORAL
Qty: 90 TABLET | Refills: 0 | Status: SHIPPED | OUTPATIENT
Start: 2020-04-10 | End: 2020-05-20

## 2020-04-10 NOTE — TELEPHONE ENCOUNTER
Requested Prescriptions   Pending Prescriptions Disp Refills     traMADol (ULTRAM) 50 MG tablet [Pharmacy Med Name: TRAMADOL HCL 50MG TABS]        Last Written Prescription Date:  03/09/2020  Last Fill Quantity: 90,   # refills: 0  Last Office Visit: 01/23/2020-Shant  Future Office visit:       Routing refill request to provider for review/approval because:  Drug not on the [facility], [facility] or  Health refill protocol or controlled substance 90 tablet 0     Sig: TAKE 1 TABLET BY MOUTH EVERY 8 HOURS AS NEEDED FOR PAIN. **03/15/20**       There is no refill protocol information for this order

## 2020-05-14 DIAGNOSIS — M62.830 MUSCLE SPASM OF BACK: ICD-10-CM

## 2020-05-14 DIAGNOSIS — G47.00 PERSISTENT INSOMNIA: ICD-10-CM

## 2020-05-14 DIAGNOSIS — F33.41 RECURRENT MAJOR DEPRESSIVE DISORDER, IN PARTIAL REMISSION (H): ICD-10-CM

## 2020-05-18 DIAGNOSIS — G89.4 CHRONIC PAIN SYNDROME: ICD-10-CM

## 2020-05-19 RX ORDER — AMITRIPTYLINE HYDROCHLORIDE 100 MG/1
TABLET ORAL
Qty: 90 TABLET | Refills: 3 | Status: SHIPPED | OUTPATIENT
Start: 2020-05-19 | End: 2021-04-21

## 2020-05-19 RX ORDER — AMITRIPTYLINE HYDROCHLORIDE 50 MG/1
TABLET ORAL
Qty: 90 TABLET | Refills: 3 | Status: SHIPPED | OUTPATIENT
Start: 2020-05-19 | End: 2021-04-21

## 2020-05-19 NOTE — TELEPHONE ENCOUNTER
Routing refill request to provider for review/approval because:  Drug not on the [facility] refill protocol - Tizanidine   Two, different active doses of amitriptyline on med list - please indicate total daily dose patient should be taking.     Nena Kent RN  [facility]

## 2020-05-20 RX ORDER — TRAMADOL HYDROCHLORIDE 50 MG/1
TABLET ORAL
Qty: 90 TABLET | Refills: 0 | Status: SHIPPED | OUTPATIENT
Start: 2020-05-20 | End: 2020-06-17

## 2020-05-20 NOTE — TELEPHONE ENCOUNTER
Requested Prescriptions   Pending Prescriptions Disp Refills     traMADol (ULTRAM) 50 MG tablet [Pharmacy Med Name: TRAMADOL HCL 50MG TABS] 90 tablet 0     Sig: TAKE ONE TABLET BY MOUTH EVERY 8 HOURS AS NEEDED FOR PAIN       There is no refill protocol information for this order        Routing refill request to provider for review/approval because:  Drug not on the [facility] refill protocol     Fredy Jean-Baptiste RN, BSN, PHN

## 2020-05-21 ENCOUNTER — TELEPHONE (OUTPATIENT)
Dept: FAMILY MEDICINE | Facility: CLINIC | Age: 70
End: 2020-05-21

## 2020-05-21 NOTE — TELEPHONE ENCOUNTER
Reason for Call:  Other call back    Detailed comments: Patient is scheduled for jury duties and is wondering can the provider write a letter stating why that wounldn't be in her best interest with COVID19 going on.    Phone Number Patient can be reached at: Home number on file [phone] (home)    Best Time: Anytime.    Can we leave a detailed message on this number? YES    Call taken on 5/21/2020 at 4:15 PM by Dora Waddell

## 2020-05-22 NOTE — TELEPHONE ENCOUNTER
Called and spoke with patient.     Notes she received notice in the mail yesterday that she has been selected for jury duty.  She does not want to go through with this, notes this is the 3rd time she has been called. Notes she has many health issues, including asthma and loss of vision in one of her eyes, chronic pain, and is a fall risk. Would not be able to take bus for transport to court house, as jurors are instructed to do.  Is asking for provider to create a letter excusing her from jury duty.   Patient notes she is not sure when she is being called for this, received her note care notice yesterday in mail and stated she has 10 days to respond. Patient notes she can not read the note card very well, and can not see what she needs to do.  Patient notes that her daughter will be home later today and she will have her look at card and read instructions and look further into this and gather more information and then patient will call back to office and inform what is needed or by when.  Writer recommended that patient schedule telephone visit with provider to discuss this further. Noted that PCP is not taking virtual visits till 5/28/20 so if she can not wait till this date (or after), will need to schedule virtual visit with a partner of Dr. Camacho's.  Patient agreed and understanding of this. She will look into more of this and call back to schedule visit.    **If patient calls back, assist in scheduling telephone visit with Dr. Camacho (if patient has time to wait till 5/28 or later) OR schedule with partner who is available as soon as patient needs, to further discuss a letter or excuse from upcoming jury duty assignment due to her current health status and being high risk for COVID-19, if exposed.    Alice Hernandez RN  [facility]

## 2020-05-22 NOTE — TELEPHONE ENCOUNTER
.Reason for Call:   jury duty     Detailed comments: regarding not being able to attend/the website to     send form/or letter/ [email or web address]      Jury duty # [id number]    Made phone visit appointment for 5-    Phone Number Patient can be reached at: Home number on file [phone] (home)    Best Time: anytime    Can we leave a detailed message on this number? YES    Call taken on 5/22/2020 at 10:47 AM by Anel Damico

## 2020-05-28 ENCOUNTER — VIRTUAL VISIT (OUTPATIENT)
Dept: FAMILY MEDICINE | Facility: CLINIC | Age: 70
End: 2020-05-28
Payer: MEDICARE

## 2020-05-28 DIAGNOSIS — M79.7 CHRONIC FATIGUE SYNDROME WITH FIBROMYALGIA: ICD-10-CM

## 2020-05-28 DIAGNOSIS — N18.30 TYPE 2 DIABETES MELLITUS WITH STAGE 3 CHRONIC KIDNEY DISEASE, WITHOUT LONG-TERM CURRENT USE OF INSULIN (H): Primary | ICD-10-CM

## 2020-05-28 DIAGNOSIS — G93.32 CHRONIC FATIGUE SYNDROME WITH FIBROMYALGIA: ICD-10-CM

## 2020-05-28 DIAGNOSIS — I10 HYPERTENSION, GOAL BELOW 140/90: ICD-10-CM

## 2020-05-28 DIAGNOSIS — F41.1 GENERALIZED ANXIETY DISORDER: ICD-10-CM

## 2020-05-28 DIAGNOSIS — J45.31 MILD PERSISTENT ASTHMA WITH ACUTE EXACERBATION: ICD-10-CM

## 2020-05-28 DIAGNOSIS — E11.22 TYPE 2 DIABETES MELLITUS WITH STAGE 3 CHRONIC KIDNEY DISEASE, WITHOUT LONG-TERM CURRENT USE OF INSULIN (H): Primary | ICD-10-CM

## 2020-05-28 DIAGNOSIS — N18.30 CKD (CHRONIC KIDNEY DISEASE) STAGE 3, GFR 30-59 ML/MIN (H): ICD-10-CM

## 2020-05-28 DIAGNOSIS — I10 ESSENTIAL HYPERTENSION WITH GOAL BLOOD PRESSURE LESS THAN 140/90: ICD-10-CM

## 2020-05-28 PROCEDURE — 99442: CPT | Performed by: FAMILY MEDICINE

## 2020-05-28 RX ORDER — LOSARTAN POTASSIUM 100 MG/1
100 TABLET ORAL DAILY
Qty: 90 TABLET | Refills: 3 | Status: SHIPPED | OUTPATIENT
Start: 2020-05-28 | End: 2020-11-20

## 2020-05-28 ASSESSMENT — PAIN SCALES - GENERAL: PAINLEVEL: NO PAIN (0)

## 2020-05-28 NOTE — PATIENT INSTRUCTIONS
At [facility], we strive to deliver an exceptional experience to you, every time we see you. If you receive a survey, please complete it as we do value your feedback.  If you have MyChart, you can expect to receive results automatically within 24 hours of their completion.  Your provider will send a note interpreting your results as well.   If you do not have MyChart, you should receive your results in about a week by mail.    Your care team:                            Family Medicine Internal Medicine   MD Anthony Ojeda MD Shantel Branch-Fleming, MD Katya Georgiev PA-C Megan Hill, APRROD Schultz, MD Pediatrics   Armando Basurto, PACABRERA Mendez, MD Mey Duarte APRN CNP   MD Jolly Pascual MD Deborah Mielke, MD Kim Thein, APRN [facility]      Clinic hours: Monday - Thursday 7 am-7 pm; Fridays 7 am-5 pm.   Urgent care: Monday - Friday 11 am-9 pm; Saturday and Sunday 9 am-5 pm.    Clinic: [phone]       [facility]: Monday - Thursday 8 am - 7 pm; Friday 8 am - 6 pm  [facility]: [phone]     Use [email or web address] for 24/7 diagnosis and treatment of dozens of conditions.

## 2020-05-28 NOTE — LETTER
[address] [address]  [phone]        May 28, 2020    Regarding:  Evelyn Vargas  [address] [place] [address]        To Whom It May Concern;    Evelyn Vargas is unable to do jury duty due to chronic health conditions that make it dangerous for her to leave her home. She has asthma, diabetes with chronic kidney disease, hypertension, severe anxiety disorder and risk of falls due to generalized weakness.     Please excuse her from jury duty due to medical conditions.       Sincerely,        Tere Camacho MD

## 2020-05-28 NOTE — PROGRESS NOTES
"Evelyn Vargas is a 69 year old female who is being evaluated via a billable telephone visit.      The patient has been notified of following:     \"This telephone visit will be conducted via a call between you and your physician/provider. We have found that certain health care needs can be provided without the need for a physical exam.  This service lets us provide the care you need with a short phone conversation.  If a prescription is necessary we can send it directly to your pharmacy.  If lab work is needed we can place an order for that and you can then stop by our lab to have the test done at a later time.    Telephone visits are billed at different rates depending on your insurance coverage. During this emergency period, for some insurers they may be billed the same as an in-person visit.  Please reach out to your insurance provider with any questions.    If during the course of the call the physician/provider feels a telephone visit is not appropriate, you will not be charged for this service.\"    Patient has given verbal consent for Telephone visit?  Yes    What phone number would you like to be contacted at? [phone]    How would you like to obtain your AVS? Mail a copy    Subjective     vEelyn Vargas is a 69 year old female who presents via phone visit today for the following health issues:    HPI  Patient needs a medical letter so she can get out of doing jury duty.            Diabetes Follow-up    How often are you checking your blood sugar? A few times a week  What time of day are you checking your blood sugars (select all that apply)?  Before and after meals  Have you had any blood sugars above 200?  No  Have you had any blood sugars below 70?  No    What symptoms do you notice when your blood sugar is low?  None    What concerns do you have today about your diabetes? None     Do you have any of these symptoms? (Select all that apply)  Burning in feet and Weight gain      BP Readings " from Last 2 Encounters:   01/23/20 (!) 155/98   10/17/19 132/86     Hemoglobin A1C (%)   Date Value   01/23/2020 6.4 (H)   10/17/2019 6.1 (H)     LDL Cholesterol Calculated (mg/dL)   Date Value   06/28/2019 64   04/05/2018 76         Hypertension Follow-up      Do you check your blood pressure regularly outside of the clinic? Yes     Are you following a low salt diet? Yes    Are your blood pressures ever more than 140 on the top number (systolic) OR more   than 90 on the bottom number (diastolic), for example 140/90? No    Depression and Anxiety Follow-Up    How are you doing with your depression since your last visit? No change    How are you doing with your anxiety since your last visit?  No change    Are you having other symptoms that might be associated with depression or anxiety? No    Have you had a significant life event? Relationship Concerns and Health Concerns     Do you have any concerns with your use of alcohol or other drugs? No    Social History     Tobacco Use     Smoking status: Never Smoker     Smokeless tobacco: Never Used   Substance Use Topics     Alcohol use: No     Drug use: No     PHQ 10/4/2018 5/24/2019 7/1/2019   PHQ-9 Total Score 14 10 7   Q9: Thoughts of better off dead/self-harm past 2 weeks Not at all Not at all Not at all     ALLIE-7 SCORE 2/6/2017 10/4/2018 5/24/2019   Total Score - - -   Total Score 14 1 4           Suicide Assessment Five-step Evaluation and Treatment (SAFE-T)    Asthma Follow-Up    Was ACT completed today?  No      Do you have a cough?  No    Are you experiencing any wheezing in your chest?  No    Do you have any shortness of breath?  No     How often are you using a short-acting (rescue) inhaler or nebulizer, such as Albuterol?  A few times a month    How many days per week do you miss taking your asthma controller medication?  0    Please describe any recent triggers for your asthma: None    Have you had any Emergency Room Visits, Urgent Care Visits, or Hospital  Admissions since your last office visit?  No    Chronic Pain Follow-Up    Where in your body do you have pain? All over, arms and legs  How has your pain affected your ability to work? Not applicable  Which of these pain treatments have you tried since your last clinic visit? Rest  How well are you sleeping? Good  How has your mood been since your last visit? About the same  Have you had a significant life event? Health Concerns  Other aggravating factors: none  Taking medication as directed? Yes, amitriptyline is helping with sleep, using Zanaflex at half tablets, pain medication doing very well.     PHQ-9 SCORE 10/4/2018 5/24/2019 7/1/2019   PHQ-9 Total Score - - -   PHQ-9 Total Score 14 10 7     ALLIE-7 SCORE 2/6/2017 10/4/2018 5/24/2019   Total Score - - -   Total Score 14 1 4     No flowsheet data found.  Encounter-Level CSA:    There are no encounter-level csa.     Patient-Level CSA:    There are no patient-level csa.         Patient Active Problem List   Diagnosis     Chronic pain syndrome     Mild persistent asthma     Vitamin B12 deficiency disease     Hypothyroidism     Obesity     Hypertension, goal below 140/90     Advanced directives, counseling/discussion     Generalized anxiety disorder     Hypertriglyceridemia     Opiate or related narcotic overdose, accidental or unintentional, subsequent encounter     Persistent insomnia     Vision loss of right eye     Chronic fatigue fibromyalgia syndrome     Type 2 diabetes mellitus with stage 3 chronic kidney disease, without long-term current use of insulin (H)     CKD (chronic kidney disease) stage 3, GFR 30-59 ml/min (H)     Systolic ejection murmur     Hx of adjustment reaction due to domestic issues     Vitamin D deficiency     Heart murmur     No past surgical history on file.    Social History     Tobacco Use     Smoking status: Never Smoker     Smokeless tobacco: Never Used   Substance Use Topics     Alcohol use: No     Family History   Family history  unknown: Yes         Current Outpatient Medications   Medication Sig Dispense Refill     albuterol (PROVENTIL) (2.5 MG/3ML) 0.083% neb solution Take 1 vial (2.5 mg) by nebulization every 6 hours as needed 360 mL 11     amitriptyline (ELAVIL) 100 MG tablet TAKE ONE TABLET BY MOUTH AT BEDTIME 90 tablet 3     amitriptyline (ELAVIL) 50 MG tablet TAKE ONE TABLET BY MOUTH AT BEDTIME 90 tablet 3     cloNIDine (CATAPRES) 0.2 MG tablet Take 1 tablet (0.2 mg) by mouth 3 times daily 270 tablet 3     cyanocobalamin (CYANOCOBALAMIN) 1000 MCG/ML injection Inject 1 mL (1,000 mcg) into the muscle every 30 days 1 mL 11     ferrous sulfate (IRON) 325 (65 FE) MG tablet Take 1 tablet (325 mg) by mouth 2 times daily 60 tablet 11     gabapentin (NEURONTIN) 300 MG capsule Take 1 capsule (300 mg) by mouth 2 times daily Arthritis, non-child proof caps 180 capsule 3     hydrOXYzine (ATARAX) 25 MG tablet Take 2 tablets (50 mg) by mouth nightly as needed for other (sleep) 60 tablet 6     losartan (COZAAR) 100 MG tablet Take 1 tablet (100 mg) by mouth daily 90 tablet 3     order for DME Equipment being ordered: 6 pairs of Core sport knee highs large, for diabetes. 6 Device 0     STATIN NOT PRESCRIBED (INTENTIONAL) 1 each daily Please choose reason not prescribed, below  1     tiZANidine (ZANAFLEX) 4 MG tablet TAKE ONE TABLET BY MOUTH THREE TIMES A DAY AS NEEDED FOR MUSCLE SPASMS 270 tablet 1     traMADol (ULTRAM) 50 MG tablet TAKE ONE TABLET BY MOUTH EVERY 8 HOURS AS NEEDED FOR PAIN 90 tablet 0     VENTOLIN  (90 Base) MCG/ACT inhaler INHALE 2 PUFFS EVERY 6 HOURS AS NEEDED FOR SHORTNESS OF BREATH 18 g 3     vitamin D3 (CHOLECALCIFEROL) 2000 units (50 mcg) tablet Take 1 tablet (2,000 Units) by mouth daily 100 tablet 3     Allergies   Allergen Reactions     Acetaminophen      Other reaction(s): Vomiting     Morphine      Other reaction(s): GI Problems/ Nausea     Tylenol      Recent Labs   Lab Test 01/23/20  1512 10/17/19  1013  06/28/19  1550 05/24/19  1657 10/04/18  1358 04/05/18  1134 02/06/17  1105  05/27/14  1701   A1C 6.4* 6.1*  --  6.6* 6.6* 6.6* 5.3   < > 5.8   LDL  --   --  64  --   --  76 116*  --  110   HDL  --   --  34*  --   --  64 75  --  40*   TRIG  --   --  157*  --   --  151*  --   --  196*   ALT  --  43  --   --   --  50 33   < > 24   CR  --  0.95  --  1.06* 0.84 0.94 0.99   < > 1.10*   GFRESTIMATED  --  62  --  54* 68 59* 56*   < > 50*   GFRESTBLACK  --  71  --  63 82 72 68   < > 61   POTASSIUM  --  4.0  --  4.2 3.9 3.7 4.3   < > 3.6   TSH  --  5.44*  --   --  3.60 2.80 3.85   < > 0.68    < > = values in this interval not displayed.      BP Readings from Last 3 Encounters:   01/23/20 (!) 155/98   10/17/19 132/86   06/28/19 138/86    Wt Readings from Last 3 Encounters:   01/23/20 86.5 kg (190 lb 12.8 oz)   10/17/19 85.7 kg (189 lb)   06/28/19 88.5 kg (195 lb)                    Reviewed and updated as needed this visit by Provider         Review of Systems   Constitutional, HEENT, cardiovascular, pulmonary, gi and gu systems are negative, except as otherwise noted.       Objective   Reported vitals:  LMP  (LMP Unknown)    healthy, alert and no distress  PSYCH: Alert and oriented times 3; coherent speech, normal   rate and volume, able to articulate logical thoughts, able   to abstract reason, no tangential thoughts, no hallucinations   or delusions  Her affect is normal  RESP: No cough, no audible wheezing, able to talk in full sentences  Remainder of exam unable to be completed due to telephone visits    Diagnostic Test Results:  Labs reviewed in Epic  No results found for this or any previous visit (from the past 24 hour(s)).        Assessment/Plan:  1. Essential hypertension with goal blood pressure less than 140/90  Would like 90 day supply, may be limited due to available supply in pharmacy  - losartan (COZAAR) 100 MG tablet; Take 1 tablet (100 mg) by mouth daily  Dispense: 90 tablet; Refill: 3    2. Type 2 diabetes  mellitus with stage 3 chronic kidney disease, without long-term current use of insulin (H)  Stable but at increased risk for COVID 19 complications and should not leave to go for jury duty.     3. Mild persistent asthma with acute exacerbation  Stable     4. Hypertension, goal below 140/90  Not well controlled on medications     5. Generalized anxiety disorder  Stable on medications     6. CKD (chronic kidney disease) stage 3, GFR 30-59 ml/min (H)  Recheck every 6 months, but will due post COVID 19     7. Chronic fatigue fibromyalgia syndrome  Stable on current medications and is doing well. Family issues as usual.       No follow-ups on file.      Phone call duration:  20 minutes    Tere Camacho MD

## 2020-05-29 ENCOUNTER — TELEPHONE (OUTPATIENT)
Dept: FAMILY MEDICINE | Facility: CLINIC | Age: 70
End: 2020-05-29

## 2020-05-29 NOTE — TELEPHONE ENCOUNTER
Letter needs to be faxed to excuse patient from jury duty. Will fax to team today.  Tere Camacho MD

## 2020-06-01 NOTE — TELEPHONE ENCOUNTER
Letter is faxed to Juror accomodation request at fax # [phone].  Kit Toussaint,  For [facility]

## 2020-06-15 DIAGNOSIS — G89.4 CHRONIC PAIN SYNDROME: ICD-10-CM

## 2020-06-17 RX ORDER — TRAMADOL HYDROCHLORIDE 50 MG/1
TABLET ORAL
Qty: 90 TABLET | Refills: 0 | Status: SHIPPED | OUTPATIENT
Start: 2020-06-17 | End: 2020-07-17

## 2020-07-16 DIAGNOSIS — G89.4 CHRONIC PAIN SYNDROME: ICD-10-CM

## 2020-07-17 RX ORDER — TRAMADOL HYDROCHLORIDE 50 MG/1
TABLET ORAL
Qty: 90 TABLET | Refills: 0 | Status: SHIPPED | OUTPATIENT
Start: 2020-07-17 | End: 2020-08-17

## 2020-08-12 DIAGNOSIS — G89.4 CHRONIC PAIN SYNDROME: ICD-10-CM

## 2020-08-12 DIAGNOSIS — I10 HYPERTENSION, GOAL BELOW 140/90: ICD-10-CM

## 2020-08-17 RX ORDER — CLONIDINE HYDROCHLORIDE 0.2 MG/1
TABLET ORAL
Qty: 270 TABLET | Refills: 0 | Status: SHIPPED | OUTPATIENT
Start: 2020-08-17 | End: 2020-11-06

## 2020-08-17 RX ORDER — TRAMADOL HYDROCHLORIDE 50 MG/1
TABLET ORAL
Qty: 90 TABLET | Refills: 0 | Status: SHIPPED | OUTPATIENT
Start: 2020-08-17 | End: 2020-09-14

## 2020-08-17 NOTE — TELEPHONE ENCOUNTER
Routing refill request to provider for review/approval because:   Clonidine   Blood pressure not under 140/90    Routing refill request to provider for review/approval because:  Drug not on the [facility] refill protocol   Cristina Velasquez RN

## 2020-09-14 DIAGNOSIS — G89.4 CHRONIC PAIN SYNDROME: ICD-10-CM

## 2020-09-14 RX ORDER — TRAMADOL HYDROCHLORIDE 50 MG/1
TABLET ORAL
Qty: 90 TABLET | Refills: 0 | Status: SHIPPED | OUTPATIENT
Start: 2020-09-15 | End: 2020-10-20

## 2020-11-03 DIAGNOSIS — M62.830 MUSCLE SPASM OF BACK: ICD-10-CM

## 2020-11-03 DIAGNOSIS — I10 HYPERTENSION, GOAL BELOW 140/90: ICD-10-CM

## 2020-11-03 DIAGNOSIS — G62.9 NEUROPATHY: ICD-10-CM

## 2020-11-04 RX ORDER — GABAPENTIN 300 MG/1
CAPSULE ORAL
Qty: 180 CAPSULE | Refills: 3 | Status: SHIPPED | OUTPATIENT
Start: 2020-11-04 | End: 2021-07-20 | Stop reason: SINTOL

## 2020-11-06 RX ORDER — CLONIDINE HYDROCHLORIDE 0.2 MG/1
TABLET ORAL
Qty: 270 TABLET | Refills: 0 | Status: SHIPPED | OUTPATIENT
Start: 2020-11-06 | End: 2021-07-16

## 2020-11-06 NOTE — TELEPHONE ENCOUNTER
"Routing refill request to provider for review/approval because:  Failed protocol.    Requested Prescriptions   Pending Prescriptions Disp Refills     cloNIDine (CATAPRES) 0.2 MG tablet [Pharmacy Med Name: CLONIDINE HCL 0.2MG TABS] 270 tablet 0     Sig: TAKE ONE TABLET BY MOUTH THREE TIMES A DAY       Central Acting Antiadrenergic Agents Failed - 11/3/2020  1:07 PM        Failed - Blood pressure under 140/90 in past 12 months     BP Readings from Last 3 Encounters:   01/23/20 (!) 155/98   10/17/19 132/86   06/28/19 138/86                 Failed - Normal serum creatinine on file within past 12 months     Recent Labs   Lab Test 10/17/19  1013   CR 0.95       Ok to refill medication if creatinine is low          Passed - Patient is 6 years of age or older        Passed - Recent (12 mo) or future (30 days) visit within the authorizing provider's specialty     Patient has had an office visit with the authorizing provider or a provider within the authorizing providers department within the previous 12 mos or has a future within next 30 days. See \"Patient Info\" tab in inbasket, or \"Choose Columns\" in Meds & Orders section of the refill encounter.              Passed - Medication is active on med list        Passed - Patient not pregnant        Passed - No positive pregnancy test on file in past 12 months       Antiadrenergic Antihypertensives Failed - 11/3/2020  1:07 PM        Failed - Blood pressure less than 140/90 in past 6 months     BP Readings from Last 3 Encounters:   01/23/20 (!) 155/98   10/17/19 132/86   06/28/19 138/86                 Failed - Normal serum creatinine on file in past 12 months     Recent Labs   Lab Test 10/17/19  1013   CR 0.95       Ok to refill medication if creatinine is low          Passed - Medication is active on med list        Passed - Patient is age 18 or older        Passed - No active pregnancy on record        Passed - No positive pregnancy test within past 12 months        Passed - " "Recent (6 mo) or future (30 days) visit within the authorizing provider's specialty     Patient had office visit in the last 6 months or has a visit in the next 30 days with authorizing provider or within the authorizing provider's specialty.  See \"Patient Info\" tab in inbasket, or \"Choose Columns\" in Meds & Orders section of the refill encounter.                       "

## 2020-11-20 ENCOUNTER — OFFICE VISIT (OUTPATIENT)
Dept: FAMILY MEDICINE | Facility: CLINIC | Age: 70
End: 2020-11-20
Payer: MEDICARE

## 2020-11-20 DIAGNOSIS — I10 ESSENTIAL HYPERTENSION WITH GOAL BLOOD PRESSURE LESS THAN 140/90: ICD-10-CM

## 2020-11-20 DIAGNOSIS — K59.01 SLOW TRANSIT CONSTIPATION: ICD-10-CM

## 2020-11-20 DIAGNOSIS — M79.7 CHRONIC FATIGUE SYNDROME WITH FIBROMYALGIA: ICD-10-CM

## 2020-11-20 DIAGNOSIS — Z13.220 SCREENING FOR HYPERLIPIDEMIA: ICD-10-CM

## 2020-11-20 DIAGNOSIS — N18.31 TYPE 2 DIABETES MELLITUS WITH STAGE 3A CHRONIC KIDNEY DISEASE, WITHOUT LONG-TERM CURRENT USE OF INSULIN (H): Primary | ICD-10-CM

## 2020-11-20 DIAGNOSIS — N18.31 STAGE 3A CHRONIC KIDNEY DISEASE (H): ICD-10-CM

## 2020-11-20 DIAGNOSIS — E03.4 HYPOTHYROIDISM DUE TO ACQUIRED ATROPHY OF THYROID: ICD-10-CM

## 2020-11-20 DIAGNOSIS — E11.22 TYPE 2 DIABETES MELLITUS WITH STAGE 3A CHRONIC KIDNEY DISEASE, WITHOUT LONG-TERM CURRENT USE OF INSULIN (H): Primary | ICD-10-CM

## 2020-11-20 DIAGNOSIS — F41.1 GENERALIZED ANXIETY DISORDER: ICD-10-CM

## 2020-11-20 DIAGNOSIS — Z23 NEED FOR VACCINATION: ICD-10-CM

## 2020-11-20 DIAGNOSIS — G93.32 CHRONIC FATIGUE SYNDROME WITH FIBROMYALGIA: ICD-10-CM

## 2020-11-20 DIAGNOSIS — G89.4 CHRONIC PAIN SYNDROME: ICD-10-CM

## 2020-11-20 DIAGNOSIS — E53.8 VITAMIN B12 DEFICIENCY DISEASE: ICD-10-CM

## 2020-11-20 DIAGNOSIS — Z23 NEED FOR PROPHYLACTIC VACCINATION AND INOCULATION AGAINST INFLUENZA: ICD-10-CM

## 2020-11-20 LAB
ANION GAP SERPL CALCULATED.3IONS-SCNC: 7 MMOL/L (ref 3–14)
BUN SERPL-MCNC: 20 MG/DL (ref 7–30)
CALCIUM SERPL-MCNC: 9.2 MG/DL (ref 8.5–10.1)
CHLORIDE SERPL-SCNC: 109 MMOL/L (ref 94–109)
CHOLEST SERPL-MCNC: 170 MG/DL
CO2 SERPL-SCNC: 26 MMOL/L (ref 20–32)
CREAT SERPL-MCNC: 1.42 MG/DL (ref 0.52–1.04)
ERYTHROCYTE [DISTWIDTH] IN BLOOD BY AUTOMATED COUNT: 15.8 % (ref 10–15)
GFR SERPL CREATININE-BSD FRML MDRD: 38 ML/MIN/{1.73_M2}
GLUCOSE SERPL-MCNC: 109 MG/DL (ref 70–99)
HBA1C MFR BLD: 5.8 % (ref 0–5.6)
HCT VFR BLD AUTO: 38.9 % (ref 35–47)
HDLC SERPL-MCNC: 67 MG/DL
HGB BLD-MCNC: 12.1 G/DL (ref 11.7–15.7)
LDLC SERPL CALC-MCNC: 70 MG/DL
MCH RBC QN AUTO: 28.7 PG (ref 26.5–33)
MCHC RBC AUTO-ENTMCNC: 31.1 G/DL (ref 31.5–36.5)
MCV RBC AUTO: 92 FL (ref 78–100)
NONHDLC SERPL-MCNC: 103 MG/DL
PLATELET # BLD AUTO: 314 10E9/L (ref 150–450)
POTASSIUM SERPL-SCNC: 3.9 MMOL/L (ref 3.4–5.3)
RBC # BLD AUTO: 4.21 10E12/L (ref 3.8–5.2)
SODIUM SERPL-SCNC: 142 MMOL/L (ref 133–144)
T4 FREE SERPL-MCNC: 0.95 NG/DL (ref 0.76–1.46)
TRIGL SERPL-MCNC: 164 MG/DL
TSH SERPL DL<=0.005 MIU/L-ACNC: 4.82 MU/L (ref 0.4–4)
VIT B12 SERPL-MCNC: 1525 PG/ML (ref 193–986)
WBC # BLD AUTO: 10.3 10E9/L (ref 4–11)

## 2020-11-20 PROCEDURE — 80048 BASIC METABOLIC PNL TOTAL CA: CPT | Performed by: FAMILY MEDICINE

## 2020-11-20 PROCEDURE — 85027 COMPLETE CBC AUTOMATED: CPT | Performed by: FAMILY MEDICINE

## 2020-11-20 PROCEDURE — 82607 VITAMIN B-12: CPT | Performed by: FAMILY MEDICINE

## 2020-11-20 PROCEDURE — 84439 ASSAY OF FREE THYROXINE: CPT | Performed by: FAMILY MEDICINE

## 2020-11-20 PROCEDURE — 99214 OFFICE O/P EST MOD 30 MIN: CPT | Mod: 25 | Performed by: FAMILY MEDICINE

## 2020-11-20 PROCEDURE — 90662 IIV NO PRSV INCREASED AG IM: CPT | Performed by: FAMILY MEDICINE

## 2020-11-20 PROCEDURE — G0008 ADMIN INFLUENZA VIRUS VAC: HCPCS | Performed by: FAMILY MEDICINE

## 2020-11-20 PROCEDURE — 83036 HEMOGLOBIN GLYCOSYLATED A1C: CPT | Performed by: FAMILY MEDICINE

## 2020-11-20 PROCEDURE — 82306 VITAMIN D 25 HYDROXY: CPT | Performed by: FAMILY MEDICINE

## 2020-11-20 PROCEDURE — 84443 ASSAY THYROID STIM HORMONE: CPT | Performed by: FAMILY MEDICINE

## 2020-11-20 PROCEDURE — 36415 COLL VENOUS BLD VENIPUNCTURE: CPT | Performed by: FAMILY MEDICINE

## 2020-11-20 PROCEDURE — 80061 LIPID PANEL: CPT | Performed by: FAMILY MEDICINE

## 2020-11-20 PROCEDURE — G0009 ADMIN PNEUMOCOCCAL VACCINE: HCPCS | Performed by: FAMILY MEDICINE

## 2020-11-20 PROCEDURE — 90732 PPSV23 VACC 2 YRS+ SUBQ/IM: CPT | Performed by: FAMILY MEDICINE

## 2020-11-20 RX ORDER — TRAMADOL HYDROCHLORIDE 50 MG/1
TABLET ORAL
Qty: 90 TABLET | Refills: 1 | Status: CANCELLED | OUTPATIENT
Start: 2020-11-20

## 2020-11-20 RX ORDER — SENNA AND DOCUSATE SODIUM 50; 8.6 MG/1; MG/1
1 TABLET, FILM COATED ORAL AT BEDTIME
Qty: 60 TABLET | Refills: 3 | Status: SHIPPED | OUTPATIENT
Start: 2020-11-20

## 2020-11-20 RX ORDER — POLYETHYLENE GLYCOL 3350 17 G/17G
1 POWDER, FOR SOLUTION ORAL DAILY
Qty: 507 G | Refills: 3 | Status: SHIPPED | OUTPATIENT
Start: 2020-11-20

## 2020-11-20 RX ORDER — LOSARTAN POTASSIUM 100 MG/1
100 TABLET ORAL DAILY
Qty: 90 TABLET | Refills: 3 | Status: SHIPPED | OUTPATIENT
Start: 2020-11-20 | End: 2021-10-13

## 2020-11-20 ASSESSMENT — PAIN SCALES - GENERAL: PAINLEVEL: EXTREME PAIN (8)

## 2020-11-20 ASSESSMENT — MIFFLIN-ST. JEOR: SCORE: 1345.08

## 2020-11-20 NOTE — NURSING NOTE
Prior to immunization administration, verified patients identity using patient s name and date of birth. Please see Immunization Activity for additional information.     Screening Questionnaire for Adult Immunization    Are you sick today?   No   Do you have allergies to medications, food, a vaccine component or latex?   No   Have you ever had a serious reaction after receiving a vaccination?   No   Do you have a long-term health problem with heart, lung, kidney, or metabolic disease (e.g., diabetes), asthma, a blood disorder, no spleen, complement component deficiency, a cochlear implant, or a spinal fluid leak?  Are you on long-term aspirin therapy?   No   Do you have cancer, leukemia, HIV/AIDS, or any other immune system problem?   No   Do you have a parent, brother, or sister with an immune system problem?   No   In the past 3 months, have you taken medications that affect  your immune system, such as prednisone, other steroids, or anticancer drugs; drugs for the treatment of rheumatoid arthritis, Crohn s disease, or psoriasis; or have you had radiation treatments?   No   Have you had a seizure, or a brain or other nervous system problem?   No   During the past year, have you received a transfusion of blood or blood    products, or been given immune (gamma) globulin or antiviral drug?   No   For women: Are you pregnant or is there a chance you could become       pregnant during the next month?   No   Have you received any vaccinations in the past 4 weeks?   No     Immunization questionnaire answers were all negative.        Per orders of Dr. Camacho , injection of PPSV23 and flu given by Monica Shepard MA. Patient instructed to remain in clinic for 15 minutes afterwards, and to report any adverse reaction to me immediately.       Screening performed by Monica Shepard MA on 11/20/2020 at 2:22 PM.

## 2020-11-20 NOTE — PROGRESS NOTES
Subjective     Evelyn Vargas is a 70 year old female who presents to clinic today for the following health issues:    Wellness exam declined    HPI         Hypertension Follow-up      Do you check your blood pressure regularly outside of the clinic? Yes     Are you following a low salt diet? Yes    Are your blood pressures ever more than 140 on the top number (systolic) OR more   than 90 on the bottom number (diastolic), for example 140/90? yes    Chronic Pain Follow-Up    Where in your body do you have pain? everywhere  How has your pain affected your ability to work? Not applicable  Which of these pain treatments have you tried since your last clinic visit? TENS unit  How well are you sleeping? Poor  How has your mood been since your last visit? About the same  Have you had a significant life event? No  Other aggravating factors: everything  Taking medication as directed? Yes    PHQ-9 SCORE 10/4/2018 5/24/2019 7/1/2019   PHQ-9 Total Score - - -   PHQ-9 Total Score 14 10 7     ALLIE-7 SCORE 2/6/2017 10/4/2018 5/24/2019   Total Score - - -   Total Score 14 1 4     No flowsheet data found.  Encounter-Level CSA:    There are no encounter-level csa.     Patient-Level CSA:    There are no patient-level csa.           Diabetes Follow-up    How often are you checking your blood sugar? One time daily  What time of day are you checking your blood sugars (select all that apply)?  Before meals  Have you had any blood sugars above 200?  No  Have you had any blood sugars below 70?  No    What symptoms do you notice when your blood sugar is low?  None    What concerns do you have today about your diabetes? None     Do you have any of these symptoms? (Select all that apply)  Burning in feet      BP Readings from Last 2 Encounters:   11/20/20 (!) 152/96   01/23/20 (!) 155/98     Hemoglobin A1C (%)   Date Value   01/23/2020 6.4 (H)   10/17/2019 6.1 (H)     LDL Cholesterol Calculated (mg/dL)   Date Value   06/28/2019 64  "  04/05/2018 76               Chronic Kidney Disease Follow-up      Do you take any over the counter pain medicine?: No    Hypothyroidism Follow-up      Since last visit, patient describes the following symptoms: Weight stable, no hair loss, no skin changes, no constipation, no loose stools      How many servings of fruits and vegetables do you eat daily?  2-3    On average, how many sweetened beverages do you drink each day (Examples: soda, juice, sweet tea, etc.  Do NOT count diet or artificially sweetened beverages)?   0    How many days per week do you exercise enough to make your heart beat faster? 5    How many minutes a day do you exercise enough to make your heart beat faster? 20 - 29    How many days per week do you miss taking your medication? 0      Review of Systems   Constitutional, HEENT, cardiovascular, pulmonary, gi and gu systems are negative, except as otherwise noted.      Objective    BP (!) 152/96 (BP Location: Right arm, Patient Position: Sitting, Cuff Size: Adult Regular)   Pulse 106   Temp 98  F (36.7  C) (Oral)   Resp 18   Ht 1.626 m (5' 4\")   Wt 83 kg (183 lb)   LMP  (LMP Unknown)   SpO2 96%   BMI 31.41 kg/m    Body mass index is 31.41 kg/m .  Physical Exam   GENERAL: healthy, alert, well nourished, well hydrated, no distress, obese  HENT: ear canals- normal; TMs- normal; Nose- normal; Mouth- no ulcers, no lesions, throat is clear with no erythema or exudate.   NECK: no tenderness, no adenopathy, no asymmetry, no masses, no stiffness; thyroid- normal to palpation  RESP: lungs clear to auscultation - no rales, no rhonchi, no wheezes  CV: regular rates and rhythm, normal S1 S2, no S3 or S4 and grade 2/6 systolic murmur at left upper sternal border, no click or rub -  ABDOMEN: soft, no tenderness, no  hepatosplenomegaly, no masses, normal bowel sounds  MS: extremities- no gross deformities noted, no edema  SKIN: no suspicious lesions, no rashes  NEURO: strength and tone- normal, " sensory exam- grossly normal, mentation- intact, speech- normal, reflexes- symmetric  BACK: no CVA tenderness, no paralumbar tenderness  PSYCH: Alert and oriented times 3; speech- coherent , normal rate and volume; able to articulate logical thoughts, able to abstract reason, no tangential thoughts, no hallucinations or delusions, affect- normal     Results for orders placed or performed in visit on 11/20/20   Lipid panel reflex to direct LDL Non-fasting     Status: Abnormal   Result Value Ref Range    Cholesterol 170 <200 mg/dL    Triglycerides 164 (H) <150 mg/dL    HDL Cholesterol 67 >49 mg/dL    LDL Cholesterol Calculated 70 <100 mg/dL    Non HDL Cholesterol 103 <130 mg/dL   HEMOGLOBIN A1C     Status: Abnormal   Result Value Ref Range    Hemoglobin A1C 5.8 (H) 0 - 5.6 %   BASIC METABOLIC PANEL     Status: Abnormal   Result Value Ref Range    Sodium 142 133 - 144 mmol/L    Potassium 3.9 3.4 - 5.3 mmol/L    Chloride 109 94 - 109 mmol/L    Carbon Dioxide 26 20 - 32 mmol/L    Anion Gap 7 3 - 14 mmol/L    Glucose 109 (H) 70 - 99 mg/dL    Urea Nitrogen 20 7 - 30 mg/dL    Creatinine 1.42 (H) 0.52 - 1.04 mg/dL    GFR Estimate 38 (L) >60 mL/min/[1.73_m2]    GFR Estimate If Black 44 (L) >60 mL/min/[1.73_m2]    Calcium 9.2 8.5 - 10.1 mg/dL   TSH with free T4 reflex     Status: Abnormal   Result Value Ref Range    TSH 4.82 (H) 0.40 - 4.00 mU/L   Vitamin B12     Status: Abnormal   Result Value Ref Range    Vitamin B12 1,525 (H) 193 - 986 pg/mL   CBC with platelets     Status: Abnormal   Result Value Ref Range    WBC 10.3 4.0 - 11.0 10e9/L    RBC Count 4.21 3.8 - 5.2 10e12/L    Hemoglobin 12.1 11.7 - 15.7 g/dL    Hematocrit 38.9 35.0 - 47.0 %    MCV 92 78 - 100 fl    MCH 28.7 26.5 - 33.0 pg    MCHC 31.1 (L) 31.5 - 36.5 g/dL    RDW 15.8 (H) 10.0 - 15.0 %    Platelet Count 314 150 - 450 10e9/L   T4 free     Status: None   Result Value Ref Range    T4 Free 0.95 0.76 - 1.46 ng/dL           Assessment & Plan     Type 2 diabetes  "mellitus with stage 3a chronic kidney disease, without long-term current use of insulin (H)  Diet controlled  - HEMOGLOBIN A1C  - Albumin Random Urine Quantitative with Creat Ratio  - T4 free    Chronic pain syndrome  Tens unit is not working any more and needs a replacement  - Tens Unit/Supplies Order for DME - ONLY FOR DME    Essential hypertension with goal blood pressure less than 140/90  Well controlled on medications   - BASIC METABOLIC PANEL  - losartan (COZAAR) 100 MG tablet  Dispense: 90 tablet; Refill: 3  - CBC with platelets    Screening for hyperlipidemia  recheck  - Lipid panel reflex to direct LDL Non-fasting    Hypothyroidism due to acquired atrophy of thyroid  euthyroid  - TSH with free T4 reflex    Chronic fatigue fibromyalgia syndrome  Stable on current medications     Generalized anxiety disorder  Stable     Stage 3a chronic kidney disease  Worsening renal function and will follow up with nephrology  - Vitamin D Deficiency    Vitamin B12 deficiency disease  High but recent injection.   - Vitamin B12    Need for vaccination  Also needs Shingrix but will have this done at pharmacy  - Pneumococcal vaccine 23 valent PPSV23  (Pneumovax) [57069]  - ADMIN MEDICARE: Pneumococcal Vaccine ()    Slow transit constipation  Will try new medication and can get these over the counter   - SENNA-docusate sodium (SENNA S) 8.6-50 MG tablet  Dispense: 60 tablet; Refill: 3  - polyethylene glycol (MIRALAX) 17 GM/Dose powder  Dispense: 507 g; Refill: 3    Need for prophylactic vaccination and inoculation against influenza  done  - FLUZONE HIGH DOSE 65+  [14927]  - ADMIN INFLUENZA (For MEDICARE Patients ONLY) []       BMI:   Estimated body mass index is 31.41 kg/m  as calculated from the following:    Height as of this encounter: 1.626 m (5' 4\").    Weight as of this encounter: 83 kg (183 lb).   Weight management plan: Discussed healthy diet and exercise guidelines         CONSULTATION/REFERRAL to " nephrology  FUTURE LABS:       - Schedule non-fasting labs in 6 months  FUTURE APPOINTMENTS:       - Follow-up visit in 6 months or sooner if any questions or concerns.   Work on weight loss  Regular exercise  See Patient Instructions    No follow-ups on file.    Tree Camacho MD  [facility]

## 2020-11-21 VITALS
OXYGEN SATURATION: 96 % | RESPIRATION RATE: 18 BRPM | HEIGHT: 64 IN | SYSTOLIC BLOOD PRESSURE: 138 MMHG | TEMPERATURE: 98 F | HEART RATE: 106 BPM | WEIGHT: 183 LBS | BODY MASS INDEX: 31.24 KG/M2 | DIASTOLIC BLOOD PRESSURE: 74 MMHG

## 2020-11-21 NOTE — PATIENT INSTRUCTIONS
Patient Education     Constipation (Adult)  Constipation means that you have bowel movements that are less frequent than usual. Stools often become very hard and difficult to pass.  Constipation is very common. At some point in life, it affects almost everyone. Since everyone's bowel habits are different, what is constipation to one person may not be to another. Your healthcare provider may do tests to diagnose constipation. It depends on what he or she finds when evaluating you.    Symptoms of constipation include:    Abdominal pain    Bloating    Vomiting    Painful bowel movements    Itching, swelling, bleeding, or pain around the anus  Causes  Constipation can have many causes. These include:    Diet low in fiber    Too much dairy    Not drinking enough liquids    Lack of exercise or physical activity (especially true for older adults)    Changes in lifestyle or daily routine, including pregnancy, aging, work, and travel    Frequent use or misuse of laxatives    Ignoring the urge to have a bowel movement or delaying it until later    Medicines, such as certain prescription pain medicines, iron supplements, antacids, certain antidepressants, and calcium supplements    Diseases like irritable bowel syndrome, bowel obstructions, stroke, diabetes, thyroid disease, Parkinson disease, hemorrhoids, and colon cancer  Complications  Potential complications of constipation can include:    Hemorrhoids    Rectal bleeding from hemorrhoids or anal fissures (skin tears)    Hernias    Dependency on laxatives    Chronic constipation    Fecal impaction, a severe form of constipation in which a large amount of hard stool is in your rectum that you can't pass    Bowel obstruction or perforation  Home care  All treatment should be done after talking with your healthcare provider. This is especially true if you have another medical problems, are taking prescription medicines, or are an older adult. Treatment most often involves  lifestyle changes. You may also need medicines. Your healthcare provider will tell you which will work best for you. Follow the advice below to help avoid this problem in the future.  Lifestyle changes  These lifestyle changes can help prevent constipation:    Diet. Eat a high-fiber diet, with fresh fruit and vegetables, and reduce dairy intake, meats, and processed foods    Fluids. It's important to get enough fluids each day. Drink plenty of water when you eat more fiber. If you are on diet that limits the amount of fluid you can have, talk about this with your healthcare provider.    Regular exercise. Check with your healthcare provider first.  Medicines  Take any medicines as directed. Some laxatives are safe to use only every now and then. Others can be taken on a regular basis. While laxatives don't cause bowel dependence, they are treating the symptoms. So your constipation may return if you don't make other changes. Talk with your healthcare provider or pharmacist if you have questions.  Prescription pain medicines can cause constipation. If you are taking this kind of medicine, ask your healthcare provider if you should also take a stool softener.  Medicines you may take to treat constipation include:    Fiber supplements    Stool softeners    Laxatives    Enemas    Rectal suppositories  Follow-up care  Follow up with your healthcare provider if symptoms don't get better in the next few days. You may need to have more tests or see a specialist.  Call 911  Call 911 if any of these occur:    Trouble breathing    Stiff, rigid abdomen that is severely painful to touch    Confusion    Fainting or loss of consciousness    Rapid heart rate    Chest pain  When to seek medical advice  Call your healthcare provider right away if any of these occur:    Fever of 100.4 F (38 C) or higher, or as directed by your healthcare provider    Failure to resume normal bowel movements    Pain in your abdomen or back gets  worse    Nausea or vomiting    Swelling in your abdomen    Blood in the stool    Black, tarry stool    Involuntary weight loss    Weakness  Skyler last reviewed this educational content on 6/1/2018 2000-2020 [employer]. [address] [place] [address], [place] [address]. All rights reserved. This information is not intended as a substitute for professional medical care. Always follow your healthcare professional's instructions.

## 2020-11-22 NOTE — RESULT ENCOUNTER NOTE
Please call patient with results (If unable to reach patient, this may be sent as a letter instead):    Dear Evelyn Vargas,     The kidney function is low compared to last year and this needs to be checked by the specialist. I will put in a referral to the kidney doctor. It may be due to high blood pressure or medication side effects. If you are using any ibuprofen, Aleve, Advil or naproxen, you need to stop this due to its harmful effects on the kidney.     Blood sugar looks good and the A1C is in good range. The thyroid test TSH is a little high but your Free T4 is in good range. B-12 is high also but this may be due to a recent injection.     I will put in an order to recheck the kidney blood test in 1 month.       Tere Camacho MD

## 2020-11-23 ENCOUNTER — TELEPHONE (OUTPATIENT)
Dept: FAMILY MEDICINE | Facility: CLINIC | Age: 70
End: 2020-11-23

## 2020-11-23 DIAGNOSIS — N18.32 TYPE 2 DIABETES MELLITUS WITH STAGE 3B CHRONIC KIDNEY DISEASE, WITHOUT LONG-TERM CURRENT USE OF INSULIN (H): Primary | ICD-10-CM

## 2020-11-23 DIAGNOSIS — E11.22 TYPE 2 DIABETES MELLITUS WITH STAGE 3B CHRONIC KIDNEY DISEASE, WITHOUT LONG-TERM CURRENT USE OF INSULIN (H): Primary | ICD-10-CM

## 2020-11-23 LAB — DEPRECATED CALCIDIOL+CALCIFEROL SERPL-MC: 34 UG/L (ref 20–75)

## 2020-11-23 NOTE — TELEPHONE ENCOUNTER
Patient contacted and informed of the below per provider documentation. Patient verbalizes understanding. Assisted in scheduling lab only appointment for 12/28/20. She would like the copy of labs and letter sent to her in the mail. Can we also print out her upcoming lab appointment and include that in the mail as well?     Team please send letter and appointment info.     Provider,   Patient does have questions.   1) She was been taking the pill form of vitamin B for the past 2 years since insurance wouldn't cover the injections. Should she stop the Vitamin B pills?     2) I don't see a referral to kidney specialist, when should she follow up with them? She doesn't take any NSAIDs. She takes Tramadol daily and Acetaminophen Migraine every once in awhile.     Nena Kent RN  [facility]          Please call patient with results (If unable to reach patient, this may be sent as a letter instead):     Dear Evelyn Vargas,      The kidney function is low compared to last year and this needs to be checked by the specialist. I will put in a referral to the kidney doctor. It may be due to high blood pressure or medication side effects. If you are using any ibuprofen, Aleve, Advil or naproxen, you need to stop this due to its harmful effects on the kidney.      Blood sugar looks good and the A1C is in good range. The thyroid test TSH is a little high but your Free T4 is in good range. B-12 is high also but this may be due to a recent injection.      I will put in an order to recheck the kidney blood test in 1 month.         Tere Camacho MD

## 2020-11-24 NOTE — TELEPHONE ENCOUNTER
Nephrology referral placed for UMP. Cut back on vitamin B-12 to 500 mcg a day instead of 1000 mcg a day. This should be perfect.   Tere Camacho MD

## 2020-11-24 NOTE — TELEPHONE ENCOUNTER
This writer attempted to contact Evelyn on 11/24/20      Reason for call provider message and left message.      If patient calls back:   Registered Nurse called. Follow Triage Call workflow        Cristina Velasquez RN

## 2020-11-25 NOTE — TELEPHONE ENCOUNTER
Spoke with patient and reviewed message with her. She said nephrology already called her to get scheduled. She verbalized understanding about the medication change.    Cristina Velasquez RN

## 2020-12-03 ENCOUNTER — TELEPHONE (OUTPATIENT)
Dept: FAMILY MEDICINE | Facility: CLINIC | Age: 70
End: 2020-12-03

## 2020-12-03 NOTE — TELEPHONE ENCOUNTER
[facility] form faxed to Mercedes Mendez, APRN CNP via [email or web address] for completion  Dr. Camacho is out this week    MAGGIE Loera.

## 2020-12-03 NOTE — TELEPHONE ENCOUNTER
RECORDS RECEIVED FROM: Internal   DATE RECEIVED:    NOTES STATUS DETAILS   OFFICE NOTE from referring provider Internal 11.23.2020 Tere Camacho MD   OFFICE NOTE from other specialist  N/A    *Only VASCULITIS or LUPUS gather office notes for the following N/A    *PULMONARY   N/A    *ENT N/A    *DERMATOLOGY N/A    *RHEUMATOLOGY N/A    DISCHARGE SUMMARY from hospital N/A    DISCHARGE REPORT from the ER N/A    MEDICATION LIST Internal    IMAGING  (NEED IMAGES AND REPORTS)     KIDNEY CT SCAN N/A    KIDNEY ULTRASOUND Internal 04.23.2018 ULTRASOUND ABDOMEN COMPLETE     MR ABDOMEN N/A    NUCLEAR MEDICINE RENAL N/A    LABS     CBC Internal 11.20.2020   CMP Internal 11.20.2020   BMP Internal 11.20.2020   UA N/A    URINE PROTEIN N/A    RENAL PANEL Internal 10.17.2019  05.24.2019   BIOPSY     KIDNEY BIOPSY  N/A

## 2020-12-04 NOTE — TELEPHONE ENCOUNTER
form placed in Dr. Camacho 's red folder in provider's bin for completion.      Nguyen BRITO (R)(ANNA)

## 2020-12-07 ENCOUNTER — MEDICAL CORRESPONDENCE (OUTPATIENT)
Dept: HEALTH INFORMATION MANAGEMENT | Facility: CLINIC | Age: 70
End: 2020-12-07

## 2020-12-09 NOTE — TELEPHONE ENCOUNTER
Form completed, but I need to find out if Evelyn has had a trial of TENS unit and the end date of this trial. Will put in box tomorrow. Tere Camacho MD

## 2020-12-10 NOTE — TELEPHONE ENCOUNTER
This writer attempted to contact pt on 12/10/20      Reason for call find out information and left message.      If patient calls back:   find out if Evelyn has had a trial of a TENS unit and the end date of this trial and route back to pool    Form is in TC office waiting pt response    Nguyen De La Paz

## 2020-12-16 NOTE — TELEPHONE ENCOUNTER
Pt states she had a TENS unit 3 years ago.  Routing to Provider with response.  Paperwork placed in Dr. Valdez Red Folder if revision is needed.  Please advise    MAGGIE Loera.

## 2020-12-16 NOTE — TELEPHONE ENCOUNTER
This writer attempted to contact pt on 12/16/20        Reason for call find out information for form received and left message.        If patient calls back:              Provider received a fax to complete. find out if Evelyn has had a trial of a TENS unit and the end date of this trial and route back to pool     Form is in TC office waiting pt response     Nguyen De La Paz

## 2020-12-16 NOTE — TELEPHONE ENCOUNTER
Called transferred from call center and she states that she is return a call from us and patient has not received her rechargeable battery Tens unit as of yet. She wants to know what the hold up is. Routing to 1st floor TC.  Carmela Vizcaino [facility]  2nd Floor  Primary Care

## 2020-12-27 ENCOUNTER — NURSE TRIAGE (OUTPATIENT)
Dept: NURSING | Facility: CLINIC | Age: 70
End: 2020-12-27

## 2020-12-27 NOTE — TELEPHONE ENCOUNTER
Patient calling as she has an appointmet on 12/28 for tests.    And she didn't realize that it was then and she is unable to get a cab until this coming week.    Transferred to scheduling to reschedule lab appointment.    Valentine Lacey RN on 12/27/2020 at 2:28 PM      Additional Information    Negative: [1] Caller is not with the adult (patient) AND [2] reporting urgent symptoms    Negative: Lab result questions    Negative: Medication questions    Negative: Caller can't be reached by phone    Negative: Caller has already spoken to PCP or another triager    Negative: RN needs further essential information from caller in order to complete triage    Negative: Requesting regular office appointment    Negative: [1] Caller requesting NON-URGENT health information AND [2] PCP's office is the best resource    General information question, no triage required and triager able to answer question    Negative: Health Information question, no triage required and triager able to answer question    Protocols used: INFORMATION ONLY CALL-A-

## 2021-01-11 DIAGNOSIS — J45.30 MILD PERSISTENT ASTHMA WITHOUT COMPLICATION: ICD-10-CM

## 2021-01-11 DIAGNOSIS — R82.90 NONSPECIFIC FINDING ON EXAMINATION OF URINE: Primary | ICD-10-CM

## 2021-01-11 DIAGNOSIS — N18.31 STAGE 3A CHRONIC KIDNEY DISEASE (H): ICD-10-CM

## 2021-01-11 LAB
ALBUMIN UR-MCNC: NEGATIVE MG/DL
ANION GAP SERPL CALCULATED.3IONS-SCNC: 7 MMOL/L (ref 3–14)
APPEARANCE UR: ABNORMAL
BACTERIA #/AREA URNS HPF: ABNORMAL /HPF
BILIRUB UR QL STRIP: NEGATIVE
BUN SERPL-MCNC: 22 MG/DL (ref 7–30)
CALCIUM SERPL-MCNC: 8.9 MG/DL (ref 8.5–10.1)
CHLORIDE SERPL-SCNC: 112 MMOL/L (ref 94–109)
CO2 SERPL-SCNC: 21 MMOL/L (ref 20–32)
COLOR UR AUTO: YELLOW
CREAT SERPL-MCNC: 1.65 MG/DL (ref 0.52–1.04)
CREAT UR-MCNC: 66 MG/DL
GFR SERPL CREATININE-BSD FRML MDRD: 31 ML/MIN/{1.73_M2}
GLUCOSE SERPL-MCNC: 99 MG/DL (ref 70–99)
GLUCOSE UR STRIP-MCNC: NEGATIVE MG/DL
HGB UR QL STRIP: ABNORMAL
KETONES UR STRIP-MCNC: NEGATIVE MG/DL
LEUKOCYTE ESTERASE UR QL STRIP: ABNORMAL
MICROALBUMIN UR-MCNC: 17 MG/L
MICROALBUMIN/CREAT UR: 25.91 MG/G CR (ref 0–25)
NITRATE UR QL: POSITIVE
NON-SQ EPI CELLS #/AREA URNS LPF: ABNORMAL /LPF
PH UR STRIP: 5.5 PH (ref 5–7)
POTASSIUM SERPL-SCNC: 3.9 MMOL/L (ref 3.4–5.3)
RBC #/AREA URNS AUTO: ABNORMAL /HPF
SODIUM SERPL-SCNC: 140 MMOL/L (ref 133–144)
SOURCE: ABNORMAL
SP GR UR STRIP: 1.02 (ref 1–1.03)
UROBILINOGEN UR STRIP-ACNC: 0.2 EU/DL (ref 0.2–1)
WBC #/AREA URNS AUTO: >100 /HPF

## 2021-01-11 PROCEDURE — 81001 URINALYSIS AUTO W/SCOPE: CPT | Performed by: FAMILY MEDICINE

## 2021-01-11 PROCEDURE — 36415 COLL VENOUS BLD VENIPUNCTURE: CPT | Performed by: FAMILY MEDICINE

## 2021-01-11 PROCEDURE — 87086 URINE CULTURE/COLONY COUNT: CPT | Performed by: FAMILY MEDICINE

## 2021-01-11 PROCEDURE — 80048 BASIC METABOLIC PNL TOTAL CA: CPT | Performed by: FAMILY MEDICINE

## 2021-01-11 PROCEDURE — 87186 SC STD MICRODIL/AGAR DIL: CPT | Performed by: FAMILY MEDICINE

## 2021-01-11 PROCEDURE — 87088 URINE BACTERIA CULTURE: CPT | Performed by: FAMILY MEDICINE

## 2021-01-11 PROCEDURE — 82043 UR ALBUMIN QUANTITATIVE: CPT | Performed by: FAMILY MEDICINE

## 2021-01-11 NOTE — TELEPHONE ENCOUNTER
Reason for call:  Medication   If this is a refill request, has the caller requested the refill from the pharmacy already? No  Will the patient be using a [facility]? No  Name of the pharmacy and phone number for the current request: [facility]  IN [place]     Name of the medication requested: SEE BELOW     Other request: CALL WHEN DONE    Phone number to reach patient:  Cell number on file:    Telephone Information:   Mobile [phone]       Best Time:  ANY    Can we leave a detailed message on this number?  YES    Travel screening: Negative

## 2021-01-12 LAB
BACTERIA SPEC CULT: ABNORMAL
Lab: ABNORMAL
SPECIMEN SOURCE: ABNORMAL

## 2021-01-12 RX ORDER — ALBUTEROL SULFATE 0.83 MG/ML
2.5 SOLUTION RESPIRATORY (INHALATION) EVERY 6 HOURS PRN
Qty: 360 ML | Refills: 11 | Status: SHIPPED | OUTPATIENT
Start: 2021-01-12

## 2021-01-12 NOTE — TELEPHONE ENCOUNTER
Routing refill request to provider for review/approval because:  ACT Total Scores 5/24/2019 7/1/2019 1/23/2020   ACT TOTAL SCORE - - -   ASTHMA ER VISITS - - -   ASTHMA HOSPITALIZATIONS - - -   ACT TOTAL SCORE (Goal Greater than or Equal to 20) 12 12 17   In the past 12 months, how many times did you visit the emergency room for your asthma without being admitted to the hospital? 0 0 0   In the past 12 months, how many times were you hospitalized overnight because of your asthma? 0 0 0     Kasandra Brandt RN

## 2021-01-13 DIAGNOSIS — N30.00 ACUTE CYSTITIS WITHOUT HEMATURIA: Primary | ICD-10-CM

## 2021-01-13 RX ORDER — SULFAMETHOXAZOLE/TRIMETHOPRIM 800-160 MG
1 TABLET ORAL 2 TIMES DAILY
Qty: 10 TABLET | Refills: 0 | Status: SHIPPED | OUTPATIENT
Start: 2021-01-13 | End: 2021-06-25

## 2021-01-13 NOTE — RESULT ENCOUNTER NOTE
Please call patient with results (If unable to reach patient, this may be sent as a letter instead):    Dear Evelyn Vargas,     The urine shows an infection. I will send in a prescription for Bactrim DS twice a day for 5 days to your pharmacy. The kidney function is continuing to go down and the appointment with nephrology on February 17 will be very important to find out why this is happening. Your blood sugar looks good.     Tere Camacho MD

## 2021-01-14 ENCOUNTER — TELEPHONE (OUTPATIENT)
Dept: FAMILY MEDICINE | Facility: CLINIC | Age: 71
End: 2021-01-14

## 2021-01-14 DIAGNOSIS — G89.4 CHRONIC PAIN SYNDROME: ICD-10-CM

## 2021-01-14 RX ORDER — TRAMADOL HYDROCHLORIDE 50 MG/1
TABLET ORAL
Qty: 90 TABLET | Refills: 1 | Status: SHIPPED | OUTPATIENT
Start: 2021-01-14 | End: 2021-03-18

## 2021-01-14 NOTE — TELEPHONE ENCOUNTER
Controlled Substance Refill Request for Tramadol  Problem List Complete:  No     PROVIDER TO CONSIDER COMPLETION OF PROBLEM LIST AND OVERVIEW/CONTROLLED SUBSTANCE AGREEMENT    Last Written Prescription Date:  11/17/20  Last Fill Quantity: 90 tablets  # refills: 1    THE MOST RECENT OFFICE VISIT MUST BE WITHIN THE PAST 3 MONTHS. AT LEAST ONE FACE TO FACE VISIT MUST OCCUR EVERY 6 MONTHS. ADDITIONAL VISITS CAN BE VIRTUAL.  (THIS STATEMENT SHOULD BE DELETED.)    Last Office Visit with [facility] primary care provider: 11/20/20    Future Office visit: None    Controlled substance agreement:   Encounter-Level CSA:    There are no encounter-level csa.     Patient-Level CSA:    There are no patient-level csa.         Last Urine Drug Screen: No results found for: CDAUT, No results found for: COMDAT, No results found for: THC13, PCP13, COC13, MAMP13, OPI13, AMP13, BZO13, TCA13, MTD13, BAR13, OXY13, PPX13, BUP13     Processing:  Rx to be electronically transmitted to pharmacy by provider      [email or web address]       checked in past 3 months?  No-problem list incomplete so  not checked.         Fredy Jean-Baptiste RN, BSN, PHN

## 2021-01-14 NOTE — TELEPHONE ENCOUNTER
Please call patient with results (If unable to reach patient, this may be sent as a letter instead):     Dear Evelyn Vargas,      The urine shows an infection. I will send in a prescription for Bactrim DS twice a day for 5 days to your pharmacy. The kidney function is continuing to go down and the appointment with nephrology on February 17 will be very important to find out why this is happening. Your blood sugar looks good.      Tere Camacho MD      Gave patient providers message and gave her date and time for nephrology appointment.          Fredy Jean-Baptiste RN, BSN, PHN

## 2021-01-17 ENCOUNTER — DOCUMENTATION ONLY (OUTPATIENT)
Dept: CARE COORDINATION | Facility: CLINIC | Age: 71
End: 2021-01-17

## 2021-01-26 ENCOUNTER — TELEPHONE (OUTPATIENT)
Dept: FAMILY MEDICINE | Facility: CLINIC | Age: 71
End: 2021-01-26

## 2021-01-26 NOTE — TELEPHONE ENCOUNTER
Called pt and let her know that it is not necessary to f/u on UTI but that she needs to keep her appt with kidney specialist

## 2021-01-26 NOTE — TELEPHONE ENCOUNTER
Follow on the urinary tract infection is not needed but the referral to kidney doctor with appointment on 2- is important to keep. They may decide to repeat the urine test at that time. Tere Camacho MD

## 2021-01-26 NOTE — TELEPHONE ENCOUNTER
Reason for call: Patient calling and wants to know that she has completed the course of antibiotics for her kidney infection and wanted to know if Dr Camacho needed her to follow up or not    Can we leave a detailed message: Yes     Patient  can be reached at: [phone]    Call taken at 10:35 am  on 1/26/2021    Carmela Vizcaino [facility]  2nd Floor  Primary Care

## 2021-02-15 ENCOUNTER — TELEPHONE (OUTPATIENT)
Dept: FAMILY MEDICINE | Facility: CLINIC | Age: 71
End: 2021-02-15

## 2021-02-15 NOTE — TELEPHONE ENCOUNTER
Reason for Call:  Other call back    Detailed comments: Patient has an appt.set with a [facility] specialist for a telephone visit on 2/17/2021. She was of the understanding it was to be in person.Her PCP had mentioned she would being doing another urine sample at the visit so she is very confused and needs advisement please. She also stated that she had canceled the appt but I see it as still in the system. .Please call her to discuss.     Phone Number Patient can be reached at: Cell number on file:    Telephone Information:   Mobile [phone]       Best Time: anytime    Can we leave a detailed message on this number? YES    Call taken on 2/15/2021 at 4:32 PM by Brianda Coburn

## 2021-02-16 NOTE — TELEPHONE ENCOUNTER
Call to patient, let voice message informing her that this is a New consult scheduled as a Telephone visit and will keep as is with Dr. Agrawal to discuss as our fellows at this time are not coming in for in person visits. Also mentioned that her labs from Jan should be ok and that Dr. Agrawal will discuss at appointment tomorrow. Provided number for call back with any questions.  Anita Beck LPN  Nephrology  [phone]

## 2021-02-17 ENCOUNTER — PRE VISIT (OUTPATIENT)
Dept: NEPHROLOGY | Facility: CLINIC | Age: 71
End: 2021-02-17

## 2021-02-17 ENCOUNTER — VIRTUAL VISIT (OUTPATIENT)
Dept: NEPHROLOGY | Facility: CLINIC | Age: 71
End: 2021-02-17
Attending: FAMILY MEDICINE
Payer: MEDICARE

## 2021-02-17 DIAGNOSIS — N18.32 STAGE 3B CHRONIC KIDNEY DISEASE (H): Primary | ICD-10-CM

## 2021-02-17 DIAGNOSIS — I10 UNCONTROLLED HYPERTENSION: ICD-10-CM

## 2021-02-17 DIAGNOSIS — N18.30 STAGE 3 CHRONIC KIDNEY DISEASE, UNSPECIFIED WHETHER STAGE 3A OR 3B CKD (H): ICD-10-CM

## 2021-02-17 PROCEDURE — 99443 PR PHYSICIAN TELEPHONE EVALUATION 21-30 MIN: CPT | Mod: 95 | Performed by: INTERNAL MEDICINE

## 2021-02-17 RX ORDER — AMLODIPINE BESYLATE 5 MG/1
5 TABLET ORAL DAILY
Qty: 90 TABLET | Refills: 3 | Status: SHIPPED | OUTPATIENT
Start: 2021-02-17

## 2021-02-17 ASSESSMENT — PAIN SCALES - GENERAL: PAINLEVEL: WORST PAIN (10)

## 2021-02-17 NOTE — PATIENT INSTRUCTIONS
Start amlodipine 5 mg daily for your high blood pressure.  Continue your current medications: Clonidine, Cozaar  Please do the lab tests which have been ordered.  This includes blood test and urine test.  Our office will call you to schedule your kidney ultrasound study.  We will see you back in 2 months through telephone visit in nephrology clinic

## 2021-02-17 NOTE — Clinical Note
Elmer Sheehan , patient being started on amlodipine 5 mg.  She is already on Cozaar and clonidine.  Please follow-up with her in 2 weeks for monitoring her blood pressure.

## 2021-02-17 NOTE — PROGRESS NOTES
Evelyn is a 70 year old who is being evaluated via a billable telephone visit.      What phone number would you like to be contacted at? [phone]  How would you like to obtain your AVS? Mail a copy  Phone call duration: 35 minutes      Nephrology Initial Consult - Telephone visit   February 17, 2021      Evelyn Vargas MRN:[id number] YOB: 1950  Date of Admission:(Not on file)  Primary care provider: Tere Camacho  Requesting physician: Tere Camacho MD    ASSESSMENT AND RECOMMENDATIONS:     Chronic kidney disease stage IIIb with minimal proteinuria  Baseline creatinine was 0.95 -1.06 Until 2019   ACR 25.91 mg/gCr( 1/11/21)   UA, January 11, 2021: pH 5.5, proteins negative.  WBC greater than 100.  RBC 2-5.  Leukocyte Estrace positive.  Nitrites positive.  She was treated with Bactrim.  Abdominal ultrasound: 2018 kidneys appeared unremarkable.  No hydronephrosis.  Patient had a 1.1 cm cyst in lower pole of left kidney.  NSAID use: No. She uses tramadol  And acetaminophen for pain  Family history of kidney disease: none   Personal history of nephrolithiasis: none   Etiology of CKD likely secondary to hypertensive nephrosclerosis and diabetic nephropathy .  With minimal proteinuria, less likely diabetic nephropathy.  With no significant proteinuria, less likely secondary to GN  -- repeat UA, UPCR    Uncontrolled HTN . Goal bp 130/80   H/o HTN  Even as a young female .H/o eclampsia and 7 miscarriages . Was on anti HTN meds , but she does not recollect what it was .   She was started on clonidine for dealing with withdrawal from opioids and later from BP control. Clonidine was started 10 yrs ago. Losartan was started 1 yr ago.  Home /82 . Per patient it never goes below that     -- will do renal US with doppler  -- will do Renin, Aldosterone  -- Continue Cozaar 100 mg daily  -- Continue clonidine 0.2 mg 3 times a day  -- Add norvasc  5 mg daily     Electrolytes: No acute  issues    Acid-base: No acute issues.  Bicarb is 21    Anemia: Patient's hemoglobin is currently normal at 12.1.  She is on iron sulfate 325 mg twice daily.    BMD: Calcium 8.9.  Phosphorus and PTH not checked.  Vitamin D: 34, November 2020.  -- We will recheck renal panel, PTH,   -- Continue vitamin D3 2000 units daily    Type II DM: A1c 5.8    Follow up in 2 months  With CKD labs  In nephrology clinic - [facility] /[facility]     Patient staffed with Dr Zari Agrawal MD, FACP  Nephrology Fellow   [facility]   Pager [phone]            REASON FOR CONSULT:     HISTORY OF PRESENT ILLNESS:  Admitting provider and nursing notes reviewed  Evelyn Vargas is a 70 year old female with T2DM,HTN, H/O Gastric bypass , referred to Nephrology clinic for further management of CKD    She stated  She does not have diabetes anymore. She   She was diagnosed with diabetes 15 yrs ago . She was on Insulin . She has right eye blindness and left eye decreased vision from retinopathy per patient . She follows with ophthalmology .  She had Gastric bypass surgery 15 yrs  .  Before surgery her weight was 320 lbs . Post surgery , now her weight is 180 lbs. Gradually her diabetes improved and she was able to be off insulin and now . She is not on any antidiabetic medication    She has has asthma , her breathing is ok as long as she does not expose herself to cold  No dizziness /lightheadedness  No CP/Cough/fever/chills/leg swelling  No abdomen pain/Nausea/vomiting. No diarrhea or constipation. No unintentional weight loss  No voiding difficulty/Hematuria. Feels does not make adequate pain  Has  Chronic back pain   No skin rash   No focal weakness/acute change in vision    PAST MEDICAL HISTORY:  Reviewed with patient on 02/17/2021   T2DM  HTN  H/o gastric bypass  15 yrs ago    MEDICATIONS:  PTA Meds  Prior to Admission medications    Medication Sig Last Dose Taking? Auth Provider   albuterol (PROVENTIL) (2.5  MG/3ML) 0.083% neb solution Take 1 vial (2.5 mg) by nebulization every 6 hours as needed Taking Yes Tere Camacho MD   amitriptyline (ELAVIL) 100 MG tablet TAKE ONE TABLET BY MOUTH AT BEDTIME Taking Yes Tere Camacho MD   amitriptyline (ELAVIL) 50 MG tablet TAKE ONE TABLET BY MOUTH AT BEDTIME Taking Yes Tere Camacho MD   cloNIDine (CATAPRES) 0.2 MG tablet TAKE ONE TABLET BY MOUTH THREE TIMES A DAY Taking Yes Tere Camacho MD   ferrous sulfate (IRON) 325 (65 FE) MG tablet Take 1 tablet (325 mg) by mouth 2 times daily Taking Yes Tere Camacho MD   gabapentin (NEURONTIN) 300 MG capsule TAKE ONE CAPSULE BY MOUTH TWICE A DAY Taking Yes Tere Camacho MD   losartan (COZAAR) 100 MG tablet Take 1 tablet (100 mg) by mouth daily Taking Yes Tere Camacho MD   order for DME Equipment being ordered: 6 pairs of Core sport knee highs large, for diabetes. Taking Yes Tere Camacho MD   STATIN NOT PRESCRIBED (INTENTIONAL) 1 each daily Please choose reason not prescribed, below Taking Yes Tere Camacho MD   sulfamethoxazole-trimethoprim (BACTRIM DS) 800-160 MG tablet Take 1 tablet by mouth 2 times daily Taking Yes Tere Camacho MD   tiZANidine (ZANAFLEX) 4 MG tablet TAKE ONE TABLET BY MOUTH THREE TIMES A DAY AS NEEDED FOR MUSCLE SPASMS Taking Yes Tere Camacho MD   traMADol (ULTRAM) 50 MG tablet TAKE ONE TABLET BY MOUTH EVERY 8 HOURS AS NEEDED FOR PAIN Taking Yes Tere Camacho MD   VENTOLIN  (90 Base) MCG/ACT inhaler INHALE 2 PUFFS EVERY 6 HOURS AS NEEDED FOR SHORTNESS OF BREATH Taking Yes Tere Camacho MD   vitamin D3 (CHOLECALCIFEROL) 2000 units (50 mcg) tablet Take 1 tablet (2,000 Units) by mouth daily Taking Yes Tere Camacho MD   hydrOXYzine (ATARAX) 25 MG tablet Take 2 tablets (50 mg) by mouth nightly as needed for other (sleep)  Patient not taking: Reported on 2/17/2021 Not Taking  Tere Camacho MD   polyethylene  glycol (MIRALAX) 17 GM/Dose powder Take 17 g (1 capful) by mouth daily  Patient not taking: Reported on 2/17/2021 Not Taking  Tere Camacho MD   SENNA-docusate sodium (SENNA S) 8.6-50 MG tablet Take 1 tablet by mouth At Bedtime  Patient not taking: Reported on 2/17/2021 Not Taking  Tere Camacho MD      Current Meds    Infusion Meds      ALLERGIES:    Allergies   Allergen Reactions     Acetaminophen      Other reaction(s): Vomiting     Morphine      Other reaction(s): GI Problems/ Nausea     Tylenol        REVIEW OF SYSTEMS:  A comprehensive of systems was negative except as noted above.    SOCIAL HISTORY:   Social History     Socioeconomic History     Marital status:      Spouse name: Not on file     Number of children: Not on file     Years of education: Not on file     Highest education level: Not on file   Occupational History     Not on file   Social Needs     Financial resource strain: Not on file     Food insecurity     Worry: Not on file     Inability: Not on file     Transportation needs     Medical: Not on file     Non-medical: Not on file   Tobacco Use     Smoking status: Never Smoker     Smokeless tobacco: Never Used   Substance and Sexual Activity     Alcohol use: No     Drug use: No     Sexual activity: Yes     Partners: Male     Birth control/protection: None   Lifestyle     Physical activity     Days per week: Not on file     Minutes per session: Not on file     Stress: Not on file   Relationships     Social connections     Talks on phone: Not on file     Gets together: Not on file     Attends [employer] service: Not on file     Active member of club or organization: Not on file     Attends meetings of clubs or organizations: Not on file     Relationship status: Not on file     Intimate partner violence     Fear of current or ex partner: Not on file     Emotionally abused: Not on file     Physically abused: Not on file     Forced sexual activity: Not on file   Other Topics Concern      Parent/sibling w/ CABG, MI or angioplasty before 65F 55M? Not Asked   Social History Narrative     Not on file     Reviewed with patient       FAMILY MEDICAL HISTORY:   Grand mother -Likely  HTN, DM  Mother - lung cancer     PHYSICAL EXAM   Deferred   Patient is alert, conversing normally, no conversational dyspnea and speaking in full sentences . Comprehension is normal    LABS:   CMP@LABRCNTIPR(na:4,potassium:4,chloride:4,co2:4,aniongap:4,g,bun:4,cr:4,gfrestimated:4,gfrestblack:4,mike:4,ma,phos:4,prottotal:4,albumin:4,bilitotal:4,alkphos:4,ast:4,alt:4)@  CBC@LABRCNTIPR(hgb:4,wbc:4,rbc:4,hct:4,mcv:4,mch:4,mchc:4,rdw:4,plt:4)@  INR@LABRCNTIPR(inr:4,ptt:4)@  ABG@LABRCNTIPR(ph:4,pco2:4,po2:4,hco3:4,baseexcess:4,o2per:4)@   URINE STUDIES  Recent Labs   Lab Test 21  1319 13  0853   COLOR Yellow Yellow   APPEARANCE Cloudy Clear   URINEGLC Negative Negative   URINEBILI Negative Moderate This is an unconfirmed screening test result. A positive result may be false.*   URINEKETONE Negative Trace*   SG 1.025 >1.030   UBLD Trace* Trace*   URINEPH 5.5 6.0   PROTEIN Negative 30*   UROBILINOGEN 0.2 >=8.0   NITRITE Positive* Negative   LEUKEST Moderate* Moderate Urine culture added due to reflex criteria*   RBCU 2-5* 2-5*   WBCU >100* 10-25*     No lab results found.  PTH  No lab results found.  IRON STUDIES  Recent Labs   Lab Test 16  1131 14  1650   IRON 66 18*    335   IRONSAT 21 5*   JOSÉ LUIS 30 7*       IMAGING:  All imaging studies reviewed by me.     Tanja Taylor MD      I have evaluated the patient. Discussed with the fellow and agree with fellow's findings and plan as documented in the fellow's note.  I have reviewed today's vital signs, notes, medications, labs and imaging.   Holly Hameed MD, MD

## 2021-02-17 NOTE — LETTER
2/17/2021       RE: Evelyn Vargas  [address]  [place] [address]     Dear Colleague,    Thank you for referring your patient, Evelyn Vargas, to the [facility] [place] at [facility]. Please see a copy of my visit note below.    Evelyn is a 70 year old who is being evaluated via a billable telephone visit.      What phone number would you like to be contacted at? [phone]  How would you like to obtain your AVS? Mail a copy  Phone call duration: 35 minutes      Nephrology Initial Consult - Telephone visit   February 17, 2021      Evelyn Vargas MRN:[id number] YOB: 1950  Date of Admission:(Not on file)  Primary care provider: Tere Camacho  Requesting physician: Tere Camacho MD    ASSESSMENT AND RECOMMENDATIONS:     Chronic kidney disease stage IIIb with minimal proteinuria  Baseline creatinine was 0.95 -1.06 Until 2019   ACR 25.91 mg/gCr( 1/11/21)   UA, January 11, 2021: pH 5.5, proteins negative.  WBC greater than 100.  RBC 2-5.  Leukocyte Estrace positive.  Nitrites positive.  She was treated with Bactrim.  Abdominal ultrasound: 2018 kidneys appeared unremarkable.  No hydronephrosis.  Patient had a 1.1 cm cyst in lower pole of left kidney.  NSAID use: No. She uses tramadol  And acetaminophen for pain  Family history of kidney disease: none   Personal history of nephrolithiasis: none   Etiology of CKD likely secondary to hypertensive nephrosclerosis and diabetic nephropathy .  With minimal proteinuria, less likely diabetic nephropathy.  With no significant proteinuria, less likely secondary to GN  -- repeat UA, UPCR    Uncontrolled HTN . Goal bp 130/80   H/o HTN  Even as a young female .H/o eclampsia and 7 miscarriages . Was on anti HTN meds , but she does not recollect what it was .   She was started on clonidine for dealing with withdrawal from opioids and later from BP  control. Clonidine was started 10 yrs ago. Losartan was started 1 yr ago.  Home /82 . Per patient it never goes below that     -- will do renal US with doppler  -- will do Renin, Aldosterone  -- Continue Cozaar 100 mg daily  -- Continue clonidine 0.2 mg 3 times a day  -- Add norvasc  5 mg daily     Electrolytes: No acute issues    Acid-base: No acute issues.  Bicarb is 21    Anemia: Patient's hemoglobin is currently normal at 12.1.  She is on iron sulfate 325 mg twice daily.    BMD: Calcium 8.9.  Phosphorus and PTH not checked.  Vitamin D: 34, November 2020.  -- We will recheck renal panel, PTH,   -- Continue vitamin D3 2000 units daily    Type II DM: A1c 5.8    Follow up in 2 months  With CKD labs  In nephrology clinic - [place] [facility] /[facility]     Patient staffed with Dr Zari Agrawal MD, FACP  Nephrology Fellow   [facility]   Pager [phone]            REASON FOR CONSULT:     HISTORY OF PRESENT ILLNESS:  Admitting provider and nursing notes reviewed  Evelyn Vargas is a 70 year old female with T2DM,HTN, H/O Gastric bypass , referred to Nephrology clinic for further management of CKD    She stated  She does not have diabetes anymore. She   She was diagnosed with diabetes 15 yrs ago . She was on Insulin . She has right eye blindness and left eye decreased vision from retinopathy per patient . She follows with ophthalmology .  She had Gastric bypass surgery 15 yrs  .  Before surgery her weight was 320 lbs . Post surgery , now her weight is 180 lbs. Gradually her diabetes improved and she was able to be off insulin and now . She is not on any antidiabetic medication    She has has asthma , her breathing is ok as long as she does not expose herself to cold  No dizziness /lightheadedness  No CP/Cough/fever/chills/leg swelling  No abdomen pain/Nausea/vomiting. No diarrhea or constipation. No unintentional weight loss  No voiding difficulty/Hematuria. Feels does not make adequate  pain  Has  Chronic back pain   No skin rash   No focal weakness/acute change in vision    PAST MEDICAL HISTORY:  Reviewed with patient on 02/17/2021   T2DM  HTN  H/o gastric bypass  15 yrs ago    MEDICATIONS:  PTA Meds  Prior to Admission medications    Medication Sig Last Dose Taking? Auth Provider   albuterol (PROVENTIL) (2.5 MG/3ML) 0.083% neb solution Take 1 vial (2.5 mg) by nebulization every 6 hours as needed Taking Yes Tere Camacho MD   amitriptyline (ELAVIL) 100 MG tablet TAKE ONE TABLET BY MOUTH AT BEDTIME Taking Yes Tere Camacho MD   amitriptyline (ELAVIL) 50 MG tablet TAKE ONE TABLET BY MOUTH AT BEDTIME Taking Yes Tere Camacho MD   cloNIDine (CATAPRES) 0.2 MG tablet TAKE ONE TABLET BY MOUTH THREE TIMES A DAY Taking Yes Tere Camacho MD   ferrous sulfate (IRON) 325 (65 FE) MG tablet Take 1 tablet (325 mg) by mouth 2 times daily Taking Yes Tere Camacho MD   gabapentin (NEURONTIN) 300 MG capsule TAKE ONE CAPSULE BY MOUTH TWICE A DAY Taking Yes Tere Camacho MD   losartan (COZAAR) 100 MG tablet Take 1 tablet (100 mg) by mouth daily Taking Yes Tere Camacho MD   order for DME Equipment being ordered: 6 pairs of Core sport knee highs large, for diabetes. Taking Yes Tere Camacho MD   STATIN NOT PRESCRIBED (INTENTIONAL) 1 each daily Please choose reason not prescribed, below Taking Yes Tere Camacho MD   sulfamethoxazole-trimethoprim (BACTRIM DS) 800-160 MG tablet Take 1 tablet by mouth 2 times daily Taking Yes Tere Camacho MD   tiZANidine (ZANAFLEX) 4 MG tablet TAKE ONE TABLET BY MOUTH THREE TIMES A DAY AS NEEDED FOR MUSCLE SPASMS Taking Yes Tere Camacho MD   traMADol (ULTRAM) 50 MG tablet TAKE ONE TABLET BY MOUTH EVERY 8 HOURS AS NEEDED FOR PAIN Taking Yes Tere Camacho MD   VENTOLIN  (90 Base) MCG/ACT inhaler INHALE 2 PUFFS EVERY 6 HOURS AS NEEDED FOR SHORTNESS OF BREATH Taking Yes Tere Camacho MD    vitamin D3 (CHOLECALCIFEROL) 2000 units (50 mcg) tablet Take 1 tablet (2,000 Units) by mouth daily Taking Yes Tere Camacho MD   hydrOXYzine (ATARAX) 25 MG tablet Take 2 tablets (50 mg) by mouth nightly as needed for other (sleep)  Patient not taking: Reported on 2/17/2021 Not Taking  Tere Camacho MD   polyethylene glycol (MIRALAX) 17 GM/Dose powder Take 17 g (1 capful) by mouth daily  Patient not taking: Reported on 2/17/2021 Not Taking  Tere Camacho MD   SENNA-docusate sodium (SENNA S) 8.6-50 MG tablet Take 1 tablet by mouth At Bedtime  Patient not taking: Reported on 2/17/2021 Not Taking  Tere Camacho MD      Current Meds    Infusion Meds      ALLERGIES:    Allergies   Allergen Reactions     Acetaminophen      Other reaction(s): Vomiting     Morphine      Other reaction(s): GI Problems/ Nausea     Tylenol        REVIEW OF SYSTEMS:  A comprehensive of systems was negative except as noted above.    SOCIAL HISTORY:   Social History     Socioeconomic History     Marital status:      Spouse name: Not on file     Number of children: Not on file     Years of education: Not on file     Highest education level: Not on file   Occupational History     Not on file   Social Needs     Financial resource strain: Not on file     Food insecurity     Worry: Not on file     Inability: Not on file     Transportation needs     Medical: Not on file     Non-medical: Not on file   Tobacco Use     Smoking status: Never Smoker     Smokeless tobacco: Never Used   Substance and Sexual Activity     Alcohol use: No     Drug use: No     Sexual activity: Yes     Partners: Male     Birth control/protection: None   Lifestyle     Physical activity     Days per week: Not on file     Minutes per session: Not on file     Stress: Not on file   Relationships     Social connections     Talks on phone: Not on file     Gets together: Not on file     Attends Conf[employer]ist service: Not on file     Active member of club or  organization: Not on file     Attends meetings of clubs or organizations: Not on file     Relationship status: Not on file     Intimate partner violence     Fear of current or ex partner: Not on file     Emotionally abused: Not on file     Physically abused: Not on file     Forced sexual activity: Not on file   Other Topics Concern     Parent/sibling w/ CABG, MI or angioplasty before 65F 55M? Not Asked   Social History Narrative     Not on file     Reviewed with patient       FAMILY MEDICAL HISTORY:   Grand mother -Likely  HTN, DM  Mother - lung cancer     PHYSICAL EXAM   Deferred   Patient is alert, conversing normally, no conversational dyspnea and speaking in full sentences . Comprehension is normal    LABS:   CMP@LABRCNTIPR(na:4,potassium:4,chloride:4,co2:4,aniongap:4,g,bun:4,cr:4,gfrestimated:4,gfrestblack:4,mike:4,ma,phos:4,prottotal:4,albumin:4,bilitotal:4,alkphos:4,ast:4,alt:4)@  CBC@LABRCNTIPR(hgb:4,wbc:4,rbc:4,hct:4,mcv:4,mch:4,mchc:4,rdw:4,plt:4)@  INR@LABRCNTIPR(inr:4,ptt:4)@  ABG@LABRCNTIPR(ph:4,pco2:4,po2:4,hco3:4,baseexcess:4,o2per:4)@   URINE STUDIES  Recent Labs   Lab Test 21  1319 13  0853   COLOR Yellow Yellow   APPEARANCE Cloudy Clear   URINEGLC Negative Negative   URINEBILI Negative Moderate This is an unconfirmed screening test result. A positive result may be false.*   URINEKETONE Negative Trace*   SG 1.025 >1.030   UBLD Trace* Trace*   URINEPH 5.5 6.0   PROTEIN Negative 30*   UROBILINOGEN 0.2 >=8.0   NITRITE Positive* Negative   LEUKEST Moderate* Moderate Urine culture added due to reflex criteria*   RBCU 2-5* 2-5*   WBCU >100* 10-25*     No lab results found.  PTH  No lab results found.  IRON STUDIES  Recent Labs   Lab Test 16  1131 14  1650   IRON 66 18*    335   IRONSAT 21 5*   JOSÉ LUIS 30 7*       IMAGING:  All imaging studies reviewed by me.     Tanja Taylor MD      I have evaluated the patient. Discussed with the fellow and agree with fellow's findings  and plan as documented in the fellow's note.  I have reviewed today's vital signs, notes, medications, labs and imaging.   Holly Hameed MD, MD

## 2021-03-03 ENCOUNTER — TELEPHONE (OUTPATIENT)
Dept: NEPHROLOGY | Facility: CLINIC | Age: 71
End: 2021-03-03

## 2021-03-04 NOTE — TELEPHONE ENCOUNTER
Patient reports she has a couple readings written down 160/85 and 148/70. Patient stated they all are usually around this range. Patient said she missed her radiology appointment because she didn't have a ride. Provided patient phone number to reschedule. Patient had no other questions or concerns.    Will send to MD to review.

## 2021-03-16 DIAGNOSIS — G89.4 CHRONIC PAIN SYNDROME: ICD-10-CM

## 2021-03-18 RX ORDER — TRAMADOL HYDROCHLORIDE 50 MG/1
TABLET ORAL
Qty: 90 TABLET | Refills: 1 | Status: SHIPPED | OUTPATIENT
Start: 2021-03-18 | End: 2021-05-19

## 2021-03-18 NOTE — TELEPHONE ENCOUNTER
Routing refill request to provider for review/approval because:  Drug not on the [facility] refill protocol     Teagan TREVIZON, RN

## 2021-03-23 NOTE — TELEPHONE ENCOUNTER
Please advise her to continue to check her vitals and maintain a log. Also she has still not done her labs and US . I request her to get them done prior to her appointment with me in April 2021

## 2021-03-23 NOTE — TELEPHONE ENCOUNTER
Left message for patient to return call 3/23 and 3/24.  Patient needs to have lab work and ultrasound completed prior to her appointment with him on 4/21. Will send message to schedulers to contact her to set up a date/time for ultrasound and labs.

## 2021-03-25 NOTE — TELEPHONE ENCOUNTER
Spoke with patient and she stated she got ultrasound and labs scheduled. Patient stated she will continue to maintain BP log. Patient had no other questions or concerns.

## 2021-04-05 ENCOUNTER — ANCILLARY PROCEDURE (OUTPATIENT)
Dept: ULTRASOUND IMAGING | Facility: CLINIC | Age: 71
End: 2021-04-05
Attending: INTERNAL MEDICINE
Payer: MEDICARE

## 2021-04-05 DIAGNOSIS — N18.30 STAGE 3 CHRONIC KIDNEY DISEASE, UNSPECIFIED WHETHER STAGE 3A OR 3B CKD (H): ICD-10-CM

## 2021-04-05 DIAGNOSIS — I10 UNCONTROLLED HYPERTENSION: ICD-10-CM

## 2021-04-05 LAB
ALBUMIN SERPL-MCNC: 3.5 G/DL (ref 3.4–5)
ALBUMIN UR-MCNC: NEGATIVE MG/DL
ANION GAP SERPL CALCULATED.3IONS-SCNC: 5 MMOL/L (ref 3–14)
APPEARANCE UR: CLEAR
BILIRUB UR QL STRIP: NEGATIVE
BUN SERPL-MCNC: 21 MG/DL (ref 7–30)
CALCIUM SERPL-MCNC: 8.9 MG/DL (ref 8.5–10.1)
CAOX CRY #/AREA URNS HPF: ABNORMAL /HPF
CHLORIDE SERPL-SCNC: 110 MMOL/L (ref 94–109)
CO2 SERPL-SCNC: 24 MMOL/L (ref 20–32)
COLOR UR AUTO: YELLOW
CREAT SERPL-MCNC: 1.42 MG/DL (ref 0.52–1.04)
CREAT UR-MCNC: 98 MG/DL
GFR SERPL CREATININE-BSD FRML MDRD: 38 ML/MIN/{1.73_M2}
GLUCOSE SERPL-MCNC: 112 MG/DL (ref 70–99)
GLUCOSE UR STRIP-MCNC: NEGATIVE MG/DL
HGB UR QL STRIP: NEGATIVE
KETONES UR STRIP-MCNC: NEGATIVE MG/DL
LEUKOCYTE ESTERASE UR QL STRIP: ABNORMAL
NITRATE UR QL: NEGATIVE
PH UR STRIP: 5 PH (ref 5–7)
PHOSPHATE SERPL-MCNC: 3.9 MG/DL (ref 2.5–4.5)
POTASSIUM SERPL-SCNC: 4.1 MMOL/L (ref 3.4–5.3)
PROT UR-MCNC: 0.29 G/L
PROT/CREAT 24H UR: 0.29 G/G CR (ref 0–0.2)
PTH-INTACT SERPL-MCNC: 109 PG/ML (ref 18–80)
RBC #/AREA URNS AUTO: 2 /HPF (ref 0–2)
SODIUM SERPL-SCNC: 139 MMOL/L (ref 133–144)
SOURCE: ABNORMAL
SP GR UR STRIP: 1.01 (ref 1–1.03)
SQUAMOUS #/AREA URNS AUTO: 1 /HPF (ref 0–1)
UROBILINOGEN UR STRIP-MCNC: 0 MG/DL (ref 0–2)
WBC #/AREA URNS AUTO: 4 /HPF (ref 0–5)

## 2021-04-05 PROCEDURE — 83970 ASSAY OF PARATHORMONE: CPT | Performed by: INTERNAL MEDICINE

## 2021-04-05 PROCEDURE — 81001 URINALYSIS AUTO W/SCOPE: CPT | Performed by: PATHOLOGY

## 2021-04-05 PROCEDURE — 82088 ASSAY OF ALDOSTERONE: CPT | Performed by: INTERNAL MEDICINE

## 2021-04-05 PROCEDURE — 99000 SPECIMEN HANDLING OFFICE-LAB: CPT | Performed by: PATHOLOGY

## 2021-04-05 PROCEDURE — 84156 ASSAY OF PROTEIN URINE: CPT | Performed by: PATHOLOGY

## 2021-04-05 PROCEDURE — 36415 COLL VENOUS BLD VENIPUNCTURE: CPT | Performed by: PATHOLOGY

## 2021-04-05 PROCEDURE — 80069 RENAL FUNCTION PANEL: CPT | Performed by: PATHOLOGY

## 2021-04-05 PROCEDURE — 84244 ASSAY OF RENIN: CPT | Mod: 90 | Performed by: PATHOLOGY

## 2021-04-05 PROCEDURE — 93975 VASCULAR STUDY: CPT | Mod: GC | Performed by: RADIOLOGY

## 2021-04-06 LAB — ALDOST SERPL-MCNC: 48.1 NG/DL (ref 0–31)

## 2021-04-12 LAB — RENIN PLAS-CCNC: 60.6 NG/ML/HR

## 2021-04-20 DIAGNOSIS — G47.00 PERSISTENT INSOMNIA: ICD-10-CM

## 2021-04-20 DIAGNOSIS — F33.41 RECURRENT MAJOR DEPRESSIVE DISORDER, IN PARTIAL REMISSION (H): ICD-10-CM

## 2021-04-20 DIAGNOSIS — M62.830 MUSCLE SPASM OF BACK: ICD-10-CM

## 2021-04-21 ENCOUNTER — TRANSFERRED RECORDS (OUTPATIENT)
Dept: MULTI SPECIALTY CLINIC | Facility: CLINIC | Age: 71
End: 2021-04-21

## 2021-04-21 ENCOUNTER — VIRTUAL VISIT (OUTPATIENT)
Dept: NEPHROLOGY | Facility: CLINIC | Age: 71
End: 2021-04-21
Attending: PHYSICIAN ASSISTANT
Payer: MEDICARE

## 2021-04-21 DIAGNOSIS — N18.32 STAGE 3B CHRONIC KIDNEY DISEASE (H): ICD-10-CM

## 2021-04-21 DIAGNOSIS — I10 UNCONTROLLED HYPERTENSION: Primary | ICD-10-CM

## 2021-04-21 LAB — RETINOPATHY: NORMAL

## 2021-04-21 PROCEDURE — 99443 PR PHYSICIAN TELEPHONE EVALUATION 21-30 MIN: CPT | Mod: 95 | Performed by: INTERNAL MEDICINE

## 2021-04-21 RX ORDER — AMITRIPTYLINE HYDROCHLORIDE 100 MG/1
TABLET ORAL
Qty: 90 TABLET | Refills: 1 | Status: SHIPPED | OUTPATIENT
Start: 2021-04-21 | End: 2021-10-13

## 2021-04-21 RX ORDER — AMITRIPTYLINE HYDROCHLORIDE 50 MG/1
TABLET ORAL
Qty: 90 TABLET | Refills: 1 | Status: SHIPPED | OUTPATIENT
Start: 2021-04-21 | End: 2021-10-13

## 2021-04-21 NOTE — PROGRESS NOTES
USE JD      Evelyn is a 70 year old who is being evaluated via a billable video visit.      How would you like to obtain your AVS? Mail a copy  If the video visit is dropped, the invitation should be resent by: Text to cell phone: [phone]  Will anyone else be joining your video visit? No      Telephone call duration : 31 minutes

## 2021-04-21 NOTE — PATIENT INSTRUCTIONS
YOUR URINE SHOWED CALCIUM OXALATES - These can cause increased risk of kidney stones .  Try to avoid diet high in oxalate:  Foods high in oxalate include:  Beans.  Beer.  Beets.  Berries.  Chocolate.  Coffee.  Cranberries.  Dark green vegetables, such as spinach.  Nuts.  Oranges.  Rhubarb.  Soda (cola).  Soy beans.  Soy milk.  Spinach.  Sweet potatoes.  Tea (black).  Tofu.  Wheat bran.      Please do  Additional blood work - renin , aldosterone , renal panel , which has been ordered for you   Thank you    Brandon Agrawal MD, FACP  Nephrology Fellow   [facility]   Pager [phone]
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
## 2021-04-21 NOTE — TELEPHONE ENCOUNTER
Prescription approved per [facility] Refill Protocol.    Марина Booth RN, [facility] Triage

## 2021-04-29 ENCOUNTER — TELEPHONE (OUTPATIENT)
Dept: NEPHROLOGY | Facility: CLINIC | Age: 71
End: 2021-04-29

## 2021-04-29 NOTE — TELEPHONE ENCOUNTER
lvm for patient to call us back to schedule a 2 month f/u appointment with Cinthia Del Real with labs Prior

## 2021-05-19 DIAGNOSIS — G89.4 CHRONIC PAIN SYNDROME: ICD-10-CM

## 2021-05-19 RX ORDER — TRAMADOL HYDROCHLORIDE 50 MG/1
TABLET ORAL
Qty: 90 TABLET | Refills: 1 | Status: SHIPPED | OUTPATIENT
Start: 2021-05-19 | End: 2021-07-20

## 2021-06-25 ENCOUNTER — OFFICE VISIT (OUTPATIENT)
Dept: FAMILY MEDICINE | Facility: CLINIC | Age: 71
End: 2021-06-25
Payer: MEDICARE

## 2021-06-25 ENCOUNTER — APPOINTMENT (OUTPATIENT)
Dept: LAB | Facility: CLINIC | Age: 71
End: 2021-06-25
Payer: MEDICARE

## 2021-06-25 VITALS
HEART RATE: 126 BPM | SYSTOLIC BLOOD PRESSURE: 137 MMHG | OXYGEN SATURATION: 96 % | BODY MASS INDEX: 32.89 KG/M2 | WEIGHT: 191.6 LBS | DIASTOLIC BLOOD PRESSURE: 75 MMHG | TEMPERATURE: 99.4 F

## 2021-06-25 DIAGNOSIS — R29.6 FALLS FREQUENTLY: ICD-10-CM

## 2021-06-25 DIAGNOSIS — E11.22 TYPE 2 DIABETES MELLITUS WITH STAGE 3B CHRONIC KIDNEY DISEASE, WITHOUT LONG-TERM CURRENT USE OF INSULIN (H): Primary | ICD-10-CM

## 2021-06-25 DIAGNOSIS — N18.31 STAGE 3A CHRONIC KIDNEY DISEASE (H): ICD-10-CM

## 2021-06-25 DIAGNOSIS — J45.31 MILD PERSISTENT ASTHMA WITH ACUTE EXACERBATION: ICD-10-CM

## 2021-06-25 DIAGNOSIS — E78.1 HYPERTRIGLYCERIDEMIA: ICD-10-CM

## 2021-06-25 DIAGNOSIS — I10 HYPERTENSION, GOAL BELOW 140/90: ICD-10-CM

## 2021-06-25 DIAGNOSIS — N18.32 TYPE 2 DIABETES MELLITUS WITH STAGE 3B CHRONIC KIDNEY DISEASE, WITHOUT LONG-TERM CURRENT USE OF INSULIN (H): Primary | ICD-10-CM

## 2021-06-25 DIAGNOSIS — E03.4 HYPOTHYROIDISM DUE TO ACQUIRED ATROPHY OF THYROID: ICD-10-CM

## 2021-06-25 DIAGNOSIS — I10 UNCONTROLLED HYPERTENSION: ICD-10-CM

## 2021-06-25 DIAGNOSIS — F33.41 RECURRENT MAJOR DEPRESSIVE DISORDER, IN PARTIAL REMISSION (H): ICD-10-CM

## 2021-06-25 DIAGNOSIS — H54.61 VISION LOSS OF RIGHT EYE: ICD-10-CM

## 2021-06-25 DIAGNOSIS — Z12.31 VISIT FOR SCREENING MAMMOGRAM: ICD-10-CM

## 2021-06-25 LAB
ALBUMIN SERPL-MCNC: 3.6 G/DL (ref 3.4–5)
ANION GAP SERPL CALCULATED.3IONS-SCNC: 7 MMOL/L (ref 3–14)
BUN SERPL-MCNC: 15 MG/DL (ref 7–30)
CALCIUM SERPL-MCNC: 8.9 MG/DL (ref 8.5–10.1)
CHLORIDE SERPL-SCNC: 107 MMOL/L (ref 94–109)
CO2 SERPL-SCNC: 27 MMOL/L (ref 20–32)
CREAT SERPL-MCNC: 1.52 MG/DL (ref 0.52–1.04)
GFR SERPL CREATININE-BSD FRML MDRD: 35 ML/MIN/{1.73_M2}
GLUCOSE SERPL-MCNC: 130 MG/DL (ref 70–99)
HBA1C MFR BLD: 6 % (ref 0–5.6)
PHOSPHATE SERPL-MCNC: 3.5 MG/DL (ref 2.5–4.5)
POTASSIUM SERPL-SCNC: 4.3 MMOL/L (ref 3.4–5.3)
SODIUM SERPL-SCNC: 141 MMOL/L (ref 133–144)

## 2021-06-25 PROCEDURE — 84244 ASSAY OF RENIN: CPT | Mod: 90 | Performed by: FAMILY MEDICINE

## 2021-06-25 PROCEDURE — 99214 OFFICE O/P EST MOD 30 MIN: CPT | Performed by: FAMILY MEDICINE

## 2021-06-25 PROCEDURE — 99000 SPECIMEN HANDLING OFFICE-LAB: CPT | Performed by: FAMILY MEDICINE

## 2021-06-25 PROCEDURE — 82088 ASSAY OF ALDOSTERONE: CPT | Performed by: FAMILY MEDICINE

## 2021-06-25 PROCEDURE — 36415 COLL VENOUS BLD VENIPUNCTURE: CPT | Performed by: FAMILY MEDICINE

## 2021-06-25 PROCEDURE — 80069 RENAL FUNCTION PANEL: CPT | Performed by: FAMILY MEDICINE

## 2021-06-25 PROCEDURE — 83036 HEMOGLOBIN GLYCOSYLATED A1C: CPT | Performed by: FAMILY MEDICINE

## 2021-06-25 RX ORDER — ALBUTEROL SULFATE 90 UG/1
2 AEROSOL, METERED RESPIRATORY (INHALATION) EVERY 6 HOURS
Qty: 18 G | Refills: 4 | Status: SHIPPED | OUTPATIENT
Start: 2021-06-25

## 2021-06-25 ASSESSMENT — PAIN SCALES - GENERAL: PAINLEVEL: WORST PAIN (10)

## 2021-06-25 NOTE — PROGRESS NOTES
"    Assessment & Plan     Type 2 diabetes mellitus with stage 3b chronic kidney disease, without long-term current use of insulin (H)  Well controlled on medications   - HEMOGLOBIN A1C    Visit for screening mammogram  Schedule mammogram for screening  - MA SCREENING DIGITAL BILAT - Future  (s+30); Future    Uncontrolled hypertension  Follow up with nephrology for management. Has had medication adjustments with addition of amlodipine  - Renal panel  - Aldosterone  - Renin activity    Mild persistent asthma with acute exacerbation  Not well controlled on medications but does not want change   - albuterol (PROAIR HFA/PROVENTIL HFA/VENTOLIN HFA) 108 (90 Base) MCG/ACT inhaler; Inhale 2 puffs into the lungs every 6 hours    Recurrent major depressive disorder, in partial remission (H)  Doing OK but issues with  doing very poorly and 30 year old daughter living in the home and not helping out. Other daughter doesn't talk with her.     Hypothyroidism due to acquired atrophy of thyroid  euthyroid    Hypertension, goal below 140/90  improved    Stage 3a chronic kidney disease  Stable     Hypertriglyceridemia  Stable     Vision loss right eye and falls frequently: having increased issues with living in her house and recommend considering moving to senior living situation with help in the home. Balance issues with vision problems and is at risk for falls.            BMI:   Estimated body mass index is 32.89 kg/m  as calculated from the following:    Height as of 11/20/20: 1.626 m (5' 4\").    Weight as of this encounter: 86.9 kg (191 lb 9.6 oz).   Weight management plan: Discussed healthy diet and exercise guidelines    FUTURE LABS:       - Schedule fasting labs in 3 months  FUTURE APPOINTMENTS:       - Follow-up visit in 3 months or sooner if any questions or concerns.   Work on weight loss  Regular exercise  See Patient Instructions    Return in about 3 months (around 9/25/2021) for Follow up, Routine preventive, with " me.    Tere Camacho MD  [facility] ARTHUR Rivas is a 71 year old who presents for the following health issues     HPI     Concern - frequent falling with recent fall  Onset: past year  Description: tripping on objects in home due to poor vision and balance, fell on stairs  Intensity: moderate  Progression of Symptoms:  worsening  Accompanying Signs & Symptoms: balance issues  Previous history of similar problem: worsening vision  Precipitating factors:        Worsened by: daughter lives in home and tends to leave things in the home  Alleviating factors:        Improved by: slowing down  Therapies tried and outcome: seeing eye clinic but they can't help her right eye and left eye vision is OK for now.     Diabetes Follow-up      How often are you checking your blood sugar? Not at all    What concerns do you have today about your diabetes? None     Do you have any of these symptoms? (Select all that apply)  Numbness in feet and Burning in feet    Have you had a diabetic eye exam in the last 12 months? Yes- Date of last eye exam: 4-,  Location: \[facility]""        BP Readings from Last 2 Encounters:   06/25/21 137/75   11/21/20 138/74     Hemoglobin A1C (%)   Date Value   06/25/2021 6.0 (H)   11/20/2020 5.8 (H)     LDL Cholesterol Calculated (mg/dL)   Date Value   11/20/2020 70   06/28/2019 64         Hyperlipidemia Follow-Up      Are you regularly taking any medication or supplement to lower your cholesterol?   No    Are you having muscle aches or other side effects that you think could be caused by your cholesterol lowering medication?  No    Hypertension Follow-up      Do you check your blood pressure regularly outside of the clinic? No     Are you following a low salt diet? Yes    Are your blood pressures ever more than 140 on the top number (systolic) OR more   than 90 on the bottom number (diastolic), for example 140/90? No    Asthma Follow-Up    Was ACT  completed today?    Yes    ACT Total Scores 1/23/2020   ACT TOTAL SCORE -   ASTHMA ER VISITS -   ASTHMA HOSPITALIZATIONS -   ACT TOTAL SCORE (Goal Greater than or Equal to 20) 17   In the past 12 months, how many times did you visit the emergency room for your asthma without being admitted to the hospital? 0   In the past 12 months, how many times were you hospitalized overnight because of your asthma? 0          How many days per week do you miss taking your asthma controller medication?  0    Please describe any recent triggers for your asthma: humidity    Have you had any Emergency Room Visits, Urgent Care Visits, or Hospital Admissions since your last office visit?  No    Chronic Kidney Disease Follow-up      Do you take any over the counter pain medicine?: No    Hypothyroidism Follow-up      Since last visit, patient describes the following symptoms: weight gain of 10 lbs    Chronic Pain Follow-Up    Where in your body do you have pain? Low back and feet mostly  How has your pain affected your ability to work? Not applicable  Which of these pain treatments have you tried since your last clinic visit? Rest  How well are you sleeping? Fair  How has your mood been since your last visit? About the same  Have you had a significant life event? Relationship Concerns and Health Concerns  Other aggravating factors: sedentary lifestyle  Taking medication as directed? Yes    PHQ-9 SCORE 10/4/2018 5/24/2019 7/1/2019   PHQ-9 Total Score - - -   PHQ-9 Total Score 14 10 7     ALLIE-7 SCORE 2/6/2017 10/4/2018 5/24/2019   Total Score - - -   Total Score 14 1 4     No flowsheet data found.  Encounter-Level CSA:    There are no encounter-level csa.     Patient-Level CSA:    There are no patient-level csa.             Review of Systems   Constitutional, HEENT, cardiovascular, pulmonary, gi and gu systems are negative, except as otherwise noted.      Objective    /75 (BP Location: Left arm, Patient Position: Sitting, Cuff Size:  Adult Regular)   Pulse 126   Temp 99.4  F (37.4  C) (Tympanic)   Wt 86.9 kg (191 lb 9.6 oz)   LMP  (LMP Unknown)   SpO2 96%   BMI 32.89 kg/m    Body mass index is 32.89 kg/m .  Physical Exam   GENERAL: healthy, alert, well nourished, well hydrated, no distress, obese  HENT: ear canals- normal; TMs- normal; Nose- normal; Mouth- no ulcers, no lesions, throat is clear with no erythema or exudate.   NECK: no tenderness, no adenopathy, no asymmetry, no masses, no stiffness; thyroid- normal to palpation  RESP: lungs clear to auscultation - no rales, no rhonchi, no wheezes  CV: regular rates and rhythm, normal S1 S2, no S3 or S4 and no murmur, no click or rub -  ABDOMEN: soft, no tenderness, no  hepatosplenomegaly, no masses, normal bowel sounds  MS: extremities- no gross deformities noted, no edema  SKIN: no suspicious lesions, no rashes  NEURO: strength and tone- normal, sensory exam- grossly normal, mentation- intact, speech- normal, reflexes- symmetric  BACK: no CVA tenderness, no paralumbar tenderness  PSYCH: Alert and oriented times 3; speech- coherent , normal rate and volume; able to articulate logical thoughts, able to abstract reason, no tangential thoughts, no hallucinations or delusions, affect- normal     Results for orders placed or performed in visit on 06/25/21 (from the past 24 hour(s))   HEMOGLOBIN A1C   Result Value Ref Range    Hemoglobin A1C 6.0 (H) 0 - 5.6 %

## 2021-06-25 NOTE — PATIENT INSTRUCTIONS
At [facility], we strive to deliver an exceptional experience to you, every time we see you. If you receive a survey, please complete it as we do value your feedback.  If you have MyChart, you can expect to receive results automatically within 24 hours of their completion.  Your provider will send a note interpreting your results as well.   If you do not have MyChart, you should receive your results in about a week by mail.    Your care team:                            Family Medicine Internal Medicine   MD Anthony Ojeda MD Shantel Branch-Fleming, MD Srinivasa Vaka, MD Katya Belousova, PACABRERA Patel, APRN CNP    Clem Schultz, MD Pediatrics   Armando Basurto, PACABRERA Mendez, CNP MD Mey Calvillo APRN CNP   MD Jolly Pascual MD Deborah Mielke, MD Jacinta Rizzo, APRN [facility]      Clinic hours: Monday - Thursday 7 am-6 pm; Fridays 7 am-5 pm.   Urgent care: Monday - Friday 10 am- 8 pm; Saturday and Sunday 9 am-5 pm.    Clinic: [phone]       [facility]: Monday - Thursday 8 am - 7 pm; Friday 8 am - 6 pm  [facility]: [phone]     Use [email or web address] for 24/7 diagnosis and treatment of dozens of conditions.

## 2021-06-25 NOTE — LETTER
June 30, 2021      Evelyn Vargas  [address] [place] [address]        Dear Evelyn Vargas,     Your test results are attached.     The diabetes test looks good. Your kidney doctor will look at the other results.     Please call me if you have any questions about these test results or about your care.     Sincerely,     Tere Camacho MD   Resulted Orders   HEMOGLOBIN A1C   Result Value Ref Range    Hemoglobin A1C 6.0 (H) 0 - 5.6 %      Comment:      Normal <5.7% Prediabetes 5.7-6.4%  Diabetes 6.5% or higher - adopted from ADA   consensus guidelines.

## 2021-06-29 LAB — ALDOST SERPL-MCNC: 14.7 NG/DL (ref 0–31)

## 2021-06-30 NOTE — RESULT ENCOUNTER NOTE
Dear Evelyn Vargas,    Your test results are attached.    The diabetes test looks good. Your kidney doctor will look at the other results.     Please call me if you have any questions about these test results or about your care.    Sincerely,    Tere Camacho MD

## 2021-07-03 LAB — RENIN PLAS-CCNC: 46.6 NG/ML/HR

## 2021-07-16 ENCOUNTER — TELEPHONE (OUTPATIENT)
Dept: FAMILY MEDICINE | Facility: CLINIC | Age: 71
End: 2021-07-16

## 2021-07-16 DIAGNOSIS — I10 HYPERTENSION, GOAL BELOW 140/90: ICD-10-CM

## 2021-07-16 DIAGNOSIS — T40.601D: Primary | ICD-10-CM

## 2021-07-16 DIAGNOSIS — G89.4 CHRONIC PAIN SYNDROME: ICD-10-CM

## 2021-07-16 RX ORDER — CLONIDINE HYDROCHLORIDE 0.2 MG/1
TABLET ORAL
Qty: 270 TABLET | Refills: 0 | Status: SHIPPED | OUTPATIENT
Start: 2021-07-16 | End: 2021-11-08

## 2021-07-16 RX ORDER — TRAMADOL HYDROCHLORIDE 50 MG/1
TABLET ORAL
Qty: 90 TABLET | Refills: 1 | OUTPATIENT
Start: 2021-07-16

## 2021-07-16 NOTE — TELEPHONE ENCOUNTER
Routing refill request to provider for review/approval because:  Drug not on the [facility] refill protocol   Creatinine   Date Value Ref Range Status   06/25/2021 1.52 (H) 0.52 - 1.04 mg/dL Final     Kasandra Brandt RN

## 2021-07-16 NOTE — TELEPHONE ENCOUNTER
We can't prescribe tramadol and gabapentin together anymore. Patient needs to decide which medication she wants to discontinue. Tere Camacho MD

## 2021-07-19 NOTE — TELEPHONE ENCOUNTER
Our new chronic pain medication guidelines require a yearly urine toxin screen. I put a future lab order in for this test. Please schedule a lab appointment. Tere Camacho MD   Due to sign Controlled Substance Agreement also. Tere Camacho MD

## 2021-07-20 ENCOUNTER — TELEPHONE (OUTPATIENT)
Dept: FAMILY MEDICINE | Facility: CLINIC | Age: 71
End: 2021-07-20

## 2021-07-20 RX ORDER — TRAMADOL HYDROCHLORIDE 50 MG/1
TABLET ORAL
Qty: 90 TABLET | Refills: 1 | Status: CANCELLED | OUTPATIENT
Start: 2021-07-20

## 2021-07-20 RX ORDER — TRAMADOL HYDROCHLORIDE 50 MG/1
50 TABLET ORAL EVERY 8 HOURS PRN
Qty: 90 TABLET | Refills: 1 | Status: SHIPPED | OUTPATIENT
Start: 2021-07-20 | End: 2021-09-28

## 2021-07-20 NOTE — TELEPHONE ENCOUNTER
Patient called the clinic this morning and spoke with this RN.   I gave her both messages from PCP below.   Patient is choosing to stop the gabapentin and is needing her tramadol refilled today.   She is also going to make a lab appointment to have the toxin screen done.     Teagan TREVIZON, RN

## 2021-07-29 ENCOUNTER — TELEPHONE (OUTPATIENT)
Dept: FAMILY MEDICINE | Facility: CLINIC | Age: 71
End: 2021-07-29

## 2021-07-29 DIAGNOSIS — M62.830 MUSCLE SPASM OF BACK: ICD-10-CM

## 2021-07-29 NOTE — TELEPHONE ENCOUNTER
1. Patient would like to know why the tizanidine was sent in only for a 30 day supply instead of 90?    2. She got her J&J Covid 19 shot and had not issues with it.    3. Patient is wanting provider to know that she will make another appointment after the temperatures drop back into the 80's or 70's.    Марина Booth RN, [facility] Triage

## 2021-07-29 NOTE — TELEPHONE ENCOUNTER
Patient called to say the prescrption for tiZANidine was switched to 30 days which she says is a problem.

## 2021-08-02 NOTE — TELEPHONE ENCOUNTER
New prescription for 90 day supply of Zanaflex sent. Will follow up when weather is cooler. Tere Camacho MD

## 2021-09-17 ENCOUNTER — DOCUMENTATION ONLY (OUTPATIENT)
Dept: LAB | Facility: CLINIC | Age: 71
End: 2021-09-17
Payer: MEDICARE

## 2021-09-27 DIAGNOSIS — G89.4 CHRONIC PAIN SYNDROME: ICD-10-CM

## 2021-09-28 RX ORDER — TRAMADOL HYDROCHLORIDE 50 MG/1
TABLET ORAL
Qty: 90 TABLET | Refills: 1 | Status: SHIPPED | OUTPATIENT
Start: 2021-09-28 | End: 2021-11-29

## 2021-10-11 DIAGNOSIS — F33.41 RECURRENT MAJOR DEPRESSIVE DISORDER, IN PARTIAL REMISSION (H): ICD-10-CM

## 2021-10-11 DIAGNOSIS — G47.00 PERSISTENT INSOMNIA: ICD-10-CM

## 2021-10-11 DIAGNOSIS — I10 ESSENTIAL HYPERTENSION WITH GOAL BLOOD PRESSURE LESS THAN 140/90: ICD-10-CM

## 2021-10-13 RX ORDER — LOSARTAN POTASSIUM 100 MG/1
TABLET ORAL
Qty: 90 TABLET | Refills: 3 | Status: SHIPPED | OUTPATIENT
Start: 2021-10-13

## 2021-10-13 RX ORDER — AMITRIPTYLINE HYDROCHLORIDE 50 MG/1
TABLET ORAL
Qty: 90 TABLET | Refills: 1 | Status: SHIPPED | OUTPATIENT
Start: 2021-10-13 | End: 2022-03-31

## 2021-10-13 RX ORDER — AMITRIPTYLINE HYDROCHLORIDE 100 MG/1
TABLET ORAL
Qty: 90 TABLET | Refills: 1 | Status: SHIPPED | OUTPATIENT
Start: 2021-10-13 | End: 2022-03-31

## 2021-10-13 NOTE — TELEPHONE ENCOUNTER
Routing refill request to provider for review/approval because:  Labs out of range:  Stephon Drew RN

## 2021-11-08 ENCOUNTER — OFFICE VISIT (OUTPATIENT)
Dept: FAMILY MEDICINE | Facility: CLINIC | Age: 71
End: 2021-11-08
Payer: MEDICARE

## 2021-11-08 VITALS
SYSTOLIC BLOOD PRESSURE: 144 MMHG | OXYGEN SATURATION: 97 % | HEIGHT: 64 IN | BODY MASS INDEX: 32.44 KG/M2 | WEIGHT: 190 LBS | DIASTOLIC BLOOD PRESSURE: 82 MMHG | HEART RATE: 121 BPM

## 2021-11-08 DIAGNOSIS — G89.4 CHRONIC PAIN SYNDROME: ICD-10-CM

## 2021-11-08 DIAGNOSIS — N18.32 TYPE 2 DIABETES MELLITUS WITH STAGE 3B CHRONIC KIDNEY DISEASE, WITHOUT LONG-TERM CURRENT USE OF INSULIN (H): ICD-10-CM

## 2021-11-08 DIAGNOSIS — N30.00 ACUTE CYSTITIS WITHOUT HEMATURIA: ICD-10-CM

## 2021-11-08 DIAGNOSIS — N18.31 STAGE 3A CHRONIC KIDNEY DISEASE (H): ICD-10-CM

## 2021-11-08 DIAGNOSIS — G93.32 CHRONIC FATIGUE SYNDROME WITH FIBROMYALGIA: ICD-10-CM

## 2021-11-08 DIAGNOSIS — M79.7 CHRONIC FATIGUE SYNDROME WITH FIBROMYALGIA: ICD-10-CM

## 2021-11-08 DIAGNOSIS — E11.22 TYPE 2 DIABETES MELLITUS WITH STAGE 3B CHRONIC KIDNEY DISEASE, WITHOUT LONG-TERM CURRENT USE OF INSULIN (H): ICD-10-CM

## 2021-11-08 DIAGNOSIS — F41.1 GENERALIZED ANXIETY DISORDER: ICD-10-CM

## 2021-11-08 DIAGNOSIS — I10 HYPERTENSION, GOAL BELOW 140/90: Primary | ICD-10-CM

## 2021-11-08 DIAGNOSIS — J45.30 MILD PERSISTENT ASTHMA WITHOUT COMPLICATION: ICD-10-CM

## 2021-11-08 DIAGNOSIS — E03.4 HYPOTHYROIDISM DUE TO ACQUIRED ATROPHY OF THYROID: ICD-10-CM

## 2021-11-08 DIAGNOSIS — G62.9 NEUROPATHY: ICD-10-CM

## 2021-11-08 DIAGNOSIS — E78.1 HYPERTRIGLYCERIDEMIA: ICD-10-CM

## 2021-11-08 LAB
ALBUMIN SERPL-MCNC: 3.7 G/DL (ref 3.4–5)
ALBUMIN UR-MCNC: NEGATIVE MG/DL
ALP SERPL-CCNC: 110 U/L (ref 40–150)
ALT SERPL W P-5'-P-CCNC: 33 U/L (ref 0–50)
AMPHETAMINES UR QL: NOT DETECTED
ANION GAP SERPL CALCULATED.3IONS-SCNC: 7 MMOL/L (ref 3–14)
APPEARANCE UR: ABNORMAL
AST SERPL W P-5'-P-CCNC: 29 U/L (ref 0–45)
BACTERIA #/AREA URNS HPF: ABNORMAL /HPF
BARBITURATES UR QL SCN: NOT DETECTED
BENZODIAZ UR QL SCN: NOT DETECTED
BILIRUB SERPL-MCNC: 0.3 MG/DL (ref 0.2–1.3)
BILIRUB UR QL STRIP: NEGATIVE
BUN SERPL-MCNC: 16 MG/DL (ref 7–30)
BUPRENORPHINE UR QL: NOT DETECTED
CALCIUM SERPL-MCNC: 8.8 MG/DL (ref 8.5–10.1)
CANNABINOIDS UR QL: NOT DETECTED
CHLORIDE BLD-SCNC: 111 MMOL/L (ref 94–109)
CHOLEST SERPL-MCNC: 219 MG/DL
CO2 SERPL-SCNC: 22 MMOL/L (ref 20–32)
COCAINE UR QL SCN: NOT DETECTED
COLOR UR AUTO: YELLOW
CREAT SERPL-MCNC: 1.22 MG/DL (ref 0.52–1.04)
CRP SERPL-MCNC: 8.1 MG/L (ref 0–8)
D-METHAMPHET UR QL: NOT DETECTED
ERYTHROCYTE [DISTWIDTH] IN BLOOD BY AUTOMATED COUNT: 13.4 % (ref 10–15)
ERYTHROCYTE [SEDIMENTATION RATE] IN BLOOD BY WESTERGREN METHOD: 37 MM/HR (ref 0–30)
FASTING STATUS PATIENT QL REPORTED: NO
GFR SERPL CREATININE-BSD FRML MDRD: 45 ML/MIN/1.73M2
GLUCOSE BLD-MCNC: 113 MG/DL (ref 70–99)
GLUCOSE UR STRIP-MCNC: NEGATIVE MG/DL
HCT VFR BLD AUTO: 55.7 % (ref 35–47)
HDLC SERPL-MCNC: 72 MG/DL
HGB BLD-MCNC: 17.9 G/DL (ref 11.7–15.7)
HGB UR QL STRIP: NEGATIVE
KETONES UR STRIP-MCNC: NEGATIVE MG/DL
LDLC SERPL CALC-MCNC: 103 MG/DL
LEUKOCYTE ESTERASE UR QL STRIP: ABNORMAL
MCH RBC QN AUTO: 29.8 PG (ref 26.5–33)
MCHC RBC AUTO-ENTMCNC: 32.1 G/DL (ref 31.5–36.5)
MCV RBC AUTO: 93 FL (ref 78–100)
METHADONE UR QL SCN: NOT DETECTED
NITRATE UR QL: NEGATIVE
NONHDLC SERPL-MCNC: 147 MG/DL
OPIATES UR QL SCN: NOT DETECTED
OXYCODONE UR QL SCN: NOT DETECTED
PCP UR QL SCN: NOT DETECTED
PH UR STRIP: 5.5 [PH] (ref 5–7)
PLATELET # BLD AUTO: 260 10E3/UL (ref 150–450)
POTASSIUM BLD-SCNC: 3.8 MMOL/L (ref 3.4–5.3)
PROPOXYPH UR QL: NOT DETECTED
PROT SERPL-MCNC: 7.9 G/DL (ref 6.8–8.8)
RBC # BLD AUTO: 6 10E6/UL (ref 3.8–5.2)
RBC #/AREA URNS AUTO: ABNORMAL /HPF
RENAL EPI CELLS #/AREA URNS HPF: ABNORMAL /HPF
SODIUM SERPL-SCNC: 140 MMOL/L (ref 133–144)
SP GR UR STRIP: 1.02 (ref 1–1.03)
SQUAMOUS #/AREA URNS AUTO: ABNORMAL /LPF
TRICYCLICS UR QL SCN: DETECTED
TRIGL SERPL-MCNC: 220 MG/DL
TSH SERPL DL<=0.005 MIU/L-ACNC: 2.6 MU/L (ref 0.4–4)
UROBILINOGEN UR STRIP-ACNC: 0.2 E.U./DL
WBC # BLD AUTO: 9.1 10E3/UL (ref 4–11)
WBC #/AREA URNS AUTO: ABNORMAL /HPF

## 2021-11-08 PROCEDURE — 85027 COMPLETE CBC AUTOMATED: CPT | Performed by: FAMILY MEDICINE

## 2021-11-08 PROCEDURE — 99214 OFFICE O/P EST MOD 30 MIN: CPT | Performed by: FAMILY MEDICINE

## 2021-11-08 PROCEDURE — 81001 URINALYSIS AUTO W/SCOPE: CPT | Mod: 59 | Performed by: FAMILY MEDICINE

## 2021-11-08 PROCEDURE — 80061 LIPID PANEL: CPT | Performed by: FAMILY MEDICINE

## 2021-11-08 PROCEDURE — 80053 COMPREHEN METABOLIC PANEL: CPT | Performed by: FAMILY MEDICINE

## 2021-11-08 PROCEDURE — 87088 URINE BACTERIA CULTURE: CPT | Performed by: FAMILY MEDICINE

## 2021-11-08 PROCEDURE — 86140 C-REACTIVE PROTEIN: CPT | Performed by: FAMILY MEDICINE

## 2021-11-08 PROCEDURE — 84443 ASSAY THYROID STIM HORMONE: CPT | Performed by: FAMILY MEDICINE

## 2021-11-08 PROCEDURE — 36415 COLL VENOUS BLD VENIPUNCTURE: CPT | Performed by: FAMILY MEDICINE

## 2021-11-08 PROCEDURE — 85652 RBC SED RATE AUTOMATED: CPT | Performed by: FAMILY MEDICINE

## 2021-11-08 PROCEDURE — 83036 HEMOGLOBIN GLYCOSYLATED A1C: CPT | Performed by: FAMILY MEDICINE

## 2021-11-08 PROCEDURE — 80306 DRUG TEST PRSMV INSTRMNT: CPT | Performed by: FAMILY MEDICINE

## 2021-11-08 PROCEDURE — 87086 URINE CULTURE/COLONY COUNT: CPT | Performed by: FAMILY MEDICINE

## 2021-11-08 RX ORDER — GABAPENTIN 300 MG/1
300 CAPSULE ORAL 2 TIMES DAILY
Qty: 180 CAPSULE | Refills: 0 | Status: SHIPPED | OUTPATIENT
Start: 2021-11-08 | End: 2022-03-31

## 2021-11-08 RX ORDER — CLONIDINE HYDROCHLORIDE 0.2 MG/1
TABLET ORAL
Qty: 270 TABLET | Refills: 3 | Status: SHIPPED | OUTPATIENT
Start: 2021-11-08

## 2021-11-08 ASSESSMENT — PAIN SCALES - GENERAL: PAINLEVEL: WORST PAIN (10)

## 2021-11-08 ASSESSMENT — ASTHMA QUESTIONNAIRES
QUESTION_3 LAST FOUR WEEKS HOW OFTEN DID YOUR ASTHMA SYMPTOMS (WHEEZING, COUGHING, SHORTNESS OF BREATH, CHEST TIGHTNESS OR PAIN) WAKE YOU UP AT NIGHT OR EARLIER THAN USUAL IN THE MORNING: ONCE OR TWICE
QUESTION_5 LAST FOUR WEEKS HOW WOULD YOU RATE YOUR ASTHMA CONTROL: COMPLETELY CONTROLLED
QUESTION_1 LAST FOUR WEEKS HOW MUCH OF THE TIME DID YOUR ASTHMA KEEP YOU FROM GETTING AS MUCH DONE AT WORK, SCHOOL OR AT HOME: SOME OF THE TIME
QUESTION_4 LAST FOUR WEEKS HOW OFTEN HAVE YOU USED YOUR RESCUE INHALER OR NEBULIZER MEDICATION (SUCH AS ALBUTEROL): ONCE A WEEK OR LESS
ACT_TOTALSCORE: 20
ACUTE_EXACERBATION_TODAY: NO
QUESTION_2 LAST FOUR WEEKS HOW OFTEN HAVE YOU HAD SHORTNESS OF BREATH: ONCE OR TWICE A WEEK

## 2021-11-08 ASSESSMENT — MIFFLIN-ST. JEOR: SCORE: 1371.83

## 2021-11-08 NOTE — PROGRESS NOTES
Assessment & Plan     Hypertension, goal below 140/90  Refill medication. Blood pressure a little high today.   - cloNIDine (CATAPRES) 0.2 MG tablet; TAKE ONE TABLET BY MOUTH THREE TIMES A DAY    Hypothyroidism due to acquired atrophy of thyroid  recheck  - TSH with free T4 reflex; Future    Type 2 diabetes mellitus with stage 3b chronic kidney disease, without long-term current use of insulin (H)  Well controlled on medications     Mild persistent asthma without complication  Should be on a controller medication rather than rescue only.     Generalized anxiety disorder  Stable     Stage 3a chronic kidney disease (H)  Recheck and follow up with nephrology as recommended.   - CBC with platelets; Future  - UA with Microscopic reflex to Culture; Future  - Comprehensive metabolic panel; Future  - UA with Microscopic reflex to Culture  - CBC with platelets  - Comprehensive metabolic panel    Chronic pain syndrome  Due for urine toxin screen  - Drug Abuse Screen Panel 13, Urine (Pain Care Package) - lab collect; Future  - Drug Abuse Screen Panel 13, Urine (Pain Care Package) - lab collect    Chronic fatigue fibromyalgia syndrome  recheck  - CRP inflammation; Future  - Erythrocyte sedimentation rate auto; Future  - Erythrocyte sedimentation rate auto  - CRP inflammation    Hypertriglyceridemia  Recheck   - Lipid panel reflex to direct LDL Non-fasting; Future  - Lipid panel reflex to direct LDL Non-fasting             CONSULTATION/REFERRAL to nephrology for follow up   FUTURE LABS:       - Schedule fasting labs in 6 months  FUTURE APPOINTMENTS:       - Follow-up visit in 6 months or sooner if any questions or concerns.   Work on weight loss  Regular exercise  See Patient Instructions    No follow-ups on file.    Tere Camacho MD  [facility]    Gene Rivas is a 71 year old who presents for the following health issues     HPI     Diabetes Follow-up    How often are you checking your  blood sugar? One time daily  What time of day are you checking your blood sugars (select all that apply)?  Before and after meals  Have you had any blood sugars above 200?  No  Have you had any blood sugars below 70?  No    What symptoms do you notice when your blood sugar is low?  None    What concerns do you have today about your diabetes? None     Do you have any of these symptoms? (Select all that apply)  Burning in feet        Hyperlipidemia Follow-Up      Are you regularly taking any medication or supplement to lower your cholesterol?   No    Are you having muscle aches or other side effects that you think could be caused by your cholesterol lowering medication?  No    Hypertension Follow-up      Do you check your blood pressure regularly outside of the clinic? No     Are you following a low salt diet? Yes    Are your blood pressures ever more than 140 on the top number (systolic) OR more   than 90 on the bottom number (diastolic), for example 140/90? No    BP Readings from Last 2 Encounters:   11/08/21 (!) 144/82   06/25/21 137/75     Hemoglobin A1C (%)   Date Value   06/25/2021 6.0 (H)   11/20/2020 5.8 (H)     LDL Cholesterol Calculated (mg/dL)   Date Value   11/20/2020 70   06/28/2019 64       Anxiety Follow-Up    How are you doing with your anxiety since your last visit? No change    Are you having other symptoms that might be associated with anxiety? No    Have you had a significant life event? No     Are you feeling depressed? No    Do you have any concerns with your use of alcohol or other drugs? No    Social History     Tobacco Use     Smoking status: Never Smoker     Smokeless tobacco: Never Used   Substance Use Topics     Alcohol use: No     Drug use: No     ALLIE-7 SCORE 2/6/2017 10/4/2018 5/24/2019   Total Score - - -   Total Score 14 1 4     PHQ 10/4/2018 5/24/2019 7/1/2019   PHQ-9 Total Score 14 10 7   Q9: Thoughts of better off dead/self-harm past 2 weeks Not at all Not at all Not at all          Asthma Follow-Up    Was ACT completed today?    Yes    ACT Total Scores 11/8/2021   ACT TOTAL SCORE -   ASTHMA ER VISITS -   ASTHMA HOSPITALIZATIONS -   ACT TOTAL SCORE (Goal Greater than or Equal to 20) 20   In the past 12 months, how many times did you visit the emergency room for your asthma without being admitted to the hospital? 0   In the past 12 months, how many times were you hospitalized overnight because of your asthma? 0          How many days per week do you miss taking your asthma controller medication?  I do not have an asthma controller medication    Please describe any recent triggers for your asthma: None    Have you had any Emergency Room Visits, Urgent Care Visits, or Hospital Admissions since your last office visit?  No    Chronic Kidney Disease Follow-up      Do you take any over the counter pain medicine?: No    Hypothyroidism Follow-up      Since last visit, patient describes the following symptoms: Weight stable, no hair loss, no skin changes, no constipation, no loose stools    Chronic Pain Follow-Up    Where in your body do you have pain? All over, low back, knees  How has your pain affected your ability to work? Not currently working - unrelated to pain  Which of these pain treatments have you tried since your last clinic visit? Relaxation techniques / Biofeedback and Rest  How well are you sleeping? Good  How has your mood been since your last visit? About the same  Have you had a significant life event? No  Other aggravating factors: none  Taking medication as directed? Yes    PHQ-9 SCORE 10/4/2018 5/24/2019 7/1/2019   PHQ-9 Total Score - - -   PHQ-9 Total Score 14 10 7     ALLIE-7 SCORE 2/6/2017 10/4/2018 5/24/2019   Total Score - - -   Total Score 14 1 4     No flowsheet data found.  CSA -- Encounter Level:    CSA: None found at the encounter level.     CSA -- Patient Level:    CSA: None found at the patient level.         How many servings of fruits and vegetables do you eat  daily?  2-3    On average, how many sweetened beverages do you drink each day (Examples: soda, juice, sweet tea, etc.  Do NOT count diet or artificially sweetened beverages)?   0    How many days per week do you exercise enough to make your heart beat faster? 4    How many minutes a day do you exercise enough to make your heart beat faster? 20 - 29    How many days per week do you miss taking your medication? 0        Review of Systems   Constitutional, HEENT, cardiovascular, pulmonary, gi and gu systems are negative, except as otherwise noted.      Objective    BP (!) 159/124 (BP Location: Left arm, Patient Position: Chair, Cuff Size: Adult Large)   Pulse (!) 121   Wt 86.2 kg (190 lb)   LMP  (LMP Unknown)   BMI 32.61 kg/m    Body mass index is 32.61 kg/m .  Physical Exam   GENERAL: healthy, alert, well nourished, well hydrated, no distress, obese  HENT: ear canals- normal; TMs- normal; Nose- normal; Mouth- no ulcers, no lesions, throat is clear with no erythema or exudate.   NECK: no tenderness, no adenopathy, no asymmetry, no masses, no stiffness; thyroid- normal to palpation  RESP: lungs clear to auscultation - no rales, no rhonchi, no wheezes  CV: regular rates and rhythm, normal S1 S2, no S3 or S4 and no murmur, no click or rub -  ABDOMEN: soft, no tenderness, no  hepatosplenomegaly, no masses, normal bowel sounds  MS: extremities- no gross deformities noted, no edema  SKIN: no suspicious lesions, no rashes, bruise on anterior left upper tibia from fall.   NEURO: strength and tone- normal, sensory exam- grossly normal, mentation- intact, speech- normal, reflexes- symmetric  BACK: no CVA tenderness, no paralumbar tenderness  PSYCH: Alert and oriented times 3; speech- coherent , normal rate and volume; able to articulate logical thoughts, able to abstract reason, no tangential thoughts, no hallucinations or delusions, affect- normal   Diabetic foot exam: normal DP and PT pulses, no trophic changes or  ulcerative lesions, normal calluses, no deformities, normal sensory exam and normal monofilament exam     No results found for this or any previous visit (from the past 24 hour(s)).

## 2021-11-08 NOTE — PATIENT INSTRUCTIONS
At [facility], we strive to deliver an exceptional experience to you, every time we see you. If you receive a survey, please complete it as we do value your feedback.  If you have MyChart, you can expect to receive results automatically within 24 hours of their completion.  Your provider will send a note interpreting your results as well.   If you do not have MyChart, you should receive your results in about a week by mail.    Your care team:                            Family Medicine Internal Medicine   MD Anthony Ojeda MD Shantel Branch-Fleming, MD Srinivasa Vaka, MD Katya Belousova, PACABRERA Patel, APRN CNP    Clem Schultz, MD Pediatrics   Armando Basurto, PACABRERA Mendez, CNP MD Mey Calvillo APRN CNP   MD Jolly Pascual MD Deborah Mielke, MD Jacinta Rizzo, APRN [facility]      Clinic hours: Monday - Thursday 7 am-6 pm; Fridays 7 am-5 pm.   Urgent care: Monday - Friday 10 am- 8 pm; Saturday and Sunday 9 am-5 pm.    Clinic: [phone]       [facility]: Monday - Thursday 8 am - 7 pm; Friday 8 am - 6 pm  [facility]: [phone]     Use [email or web address] for 24/7 diagnosis and treatment of dozens of conditions.

## 2021-11-08 NOTE — LETTER
November 10, 2021      Evelyn L Avalos Alicia  [address]  [place] [address]        Dear Ms.Gorman Vargas,    We are writing to inform you of your test results.    Your test results are attached. I am happy to let you know that they are stable.     Your thyroid test looks good. The kidney and liver tests were normal. There may be a urinary tract infection and I will call if this shows up on the culture results. We can recheck labs in 6 months.     Please call me if you have any questions about these test results or about your care.       Resulted Orders   UA with Microscopic reflex to Culture   Result Value Ref Range    Color Urine Yellow Colorless, Straw, Light Yellow, Yellow    Appearance Urine Slightly Cloudy (A) Clear    Glucose Urine Negative Negative mg/dL    Bilirubin Urine Negative Negative    Ketones Urine Negative Negative mg/dL    Specific Gravity Urine 1.020 1.003 - 1.035    Blood Urine Negative Negative    pH Urine 5.5 5.0 - 7.0    Protein Albumin Urine Negative Negative mg/dL    Urobilinogen Urine 0.2 0.2, 1.0 E.U./dL    Nitrite Urine Negative Negative    Leukocyte Esterase Urine Large (A) Negative   CBC with platelets   Result Value Ref Range    WBC Count 9.1 4.0 - 11.0 10e3/uL    RBC Count 6.00 (H) 3.80 - 5.20 10e6/uL    Hemoglobin 17.9 (H) 11.7 - 15.7 g/dL    Hematocrit 55.7 (H) 35.0 - 47.0 %    MCV 93 78 - 100 fL    MCH 29.8 26.5 - 33.0 pg    MCHC 32.1 31.5 - 36.5 g/dL    RDW 13.4 10.0 - 15.0 %    Platelet Count 260 150 - 450 10e3/uL   Lipid panel reflex to direct LDL Non-fasting   Result Value Ref Range    Cholesterol 219 (H) <200 mg/dL    Triglycerides 220 (H) <150 mg/dL    Direct Measure HDL 72 >=50 mg/dL    LDL Cholesterol Calculated 103 (H) <=100 mg/dL    Non HDL Cholesterol 147 (H) <130 mg/dL    Patient Fasting > 8hrs? No     Narrative    Cholesterol  Desirable:  <200 mg/dL    Triglycerides  Normal:  Less than 150 mg/dL  Borderline High:  150-199 mg/dL  High:  200-499 mg/dL  Very  High:  Greater than or equal to 500 mg/dL    Direct Measure HDL  Female:  Greater than or equal to 50 mg/dL   Male:  Greater than or equal to 40 mg/dL    LDL Cholesterol  Desirable:  <100mg/dL  Above Desirable:  100-129 mg/dL   Borderline High:  130-159 mg/dL   High:  160-189 mg/dL   Very High:  >= 190 mg/dL    Non HDL Cholesterol  Desirable:  130 mg/dL  Above Desirable:  130-159 mg/dL  Borderline High:  160-189 mg/dL  High:  190-219 mg/dL  Very High:  Greater than or equal to 220 mg/dL   Drug Abuse Screen Panel 13, Urine (Pain Care Package) - lab collect   Result Value Ref Range    Cannabinoids (06-ncb-0-carboxy-9-THC) Not Detected Not Detected, Indeterminate      Comment:      Cutoff for a negative cannabinoid is 50 ng/mL or less.    Phencyclidine Not Detected Not Detected, Indeterminate      Comment:      Cutoff for a negative PCP is 25 ng/mL or less.    Cocaine (Benzoylecgonine) Not Detected Not Detected, Indeterminate      Comment:      Cutoff for a negative cocaine is 150 ng/ml or less.    Methamphetamine (d-Methamphetamine) Not Detected Not Detected, Indeterminate      Comment:      Cutoff for a negative methamphetamine is 500 ng/ml or less.    Opiates (Morphine) Not Detected Not Detected, Indeterminate      Comment:      Cutoff for a negative opiate is 100 ng/ml or less.    Amphetamine (d-Amphetamine) Not Detected Not Detected, Indeterminate      Comment:      Cutoff for a negative amphetamine is 500 ng/mL or less.    Benzodiazepines (Nordiazepam) Not Detected Not Detected, Indeterminate      Comment:      Cutoff for a negative benzodiazepine is 150 ng/ml or less.    Tricyclic Antidepressants (Desipramine) Detected (A) Not Detected, Indeterminate      Comment:      Cutoff for a positive tricyclic antidepressant is greater than 300 ng/ml.   This is an unconfirmed screening result to be used for medical purposes only.     Methadone Not Detected Not Detected, Indeterminate      Comment:      Cutoff for a  negative methadone is 200 ng/ml or less.    Barbiturates (Butalbital) Not Detected Not Detected, Indeterminate      Comment:      Cutoff for a negative barbituate is 200 ng/ml or less.    Oxycodone Not Detected Not Detected, Indeterminate      Comment:      Cutoff for a negative oxycodone is 100 ng/mL or less.    Propoxyphene (Norpropoxyphene) Not Detected Not Detected, Indeterminate      Comment:      Cutoff for a negative propoxyphene is 300 ng/ml or less.    Buprenorphine Not Detected Not Detected, Indeterminate      Comment:      Cutoff for a negative buprenorphine is 10 ng/ml or less.   Erythrocyte sedimentation rate auto   Result Value Ref Range    Erythrocyte Sedimentation Rate 37 (H) 0 - 30 mm/hr   CRP inflammation   Result Value Ref Range    CRP Inflammation 8.1 (H) 0.0 - 8.0 mg/L   Comprehensive metabolic panel   Result Value Ref Range    Sodium 140 133 - 144 mmol/L    Potassium 3.8 3.4 - 5.3 mmol/L    Chloride 111 (H) 94 - 109 mmol/L    Carbon Dioxide (CO2) 22 20 - 32 mmol/L    Anion Gap 7 3 - 14 mmol/L    Urea Nitrogen 16 7 - 30 mg/dL    Creatinine 1.22 (H) 0.52 - 1.04 mg/dL    Calcium 8.8 8.5 - 10.1 mg/dL    Glucose 113 (H) 70 - 99 mg/dL    Alkaline Phosphatase 110 40 - 150 U/L    AST 29 0 - 45 U/L    ALT 33 0 - 50 U/L    Protein Total 7.9 6.8 - 8.8 g/dL    Albumin 3.7 3.4 - 5.0 g/dL    Bilirubin Total 0.3 0.2 - 1.3 mg/dL    GFR Estimate 45 (L) >60 mL/min/1.73m2      Comment:      As of July 11, 2021, eGFR is calculated by the CKD-EPI creatinine equation, without race adjustment. eGFR can be influenced by muscle mass, exercise, and diet. The reported eGFR is an estimation only and is only applicable if the renal function is stable.   TSH with free T4 reflex   Result Value Ref Range    TSH 2.60 0.40 - 4.00 mU/L   Urine Microscopic   Result Value Ref Range    Bacteria Urine Many (A) None Seen /HPF    RBC Urine 0-2 0-2 /HPF /HPF    WBC Urine  (A) 0-5 /HPF /HPF    Squamous Epithelials Urine Few (A)  None Seen /LPF    Renal Tubular Epithelials Urine Few (A) None Seen /HPF   Urine Culture   Result Value Ref Range    Culture Culture in progress        If you have any questions or concerns, please call the clinic at the number listed above.       Sincerely,      Tere Camacho MD

## 2021-11-09 ASSESSMENT — ASTHMA QUESTIONNAIRES: ACT_TOTALSCORE: 20

## 2021-11-10 LAB
BACTERIA UR CULT: ABNORMAL
HBA1C MFR BLD: 5.9 % (ref 0–5.6)

## 2021-11-10 NOTE — RESULT ENCOUNTER NOTE
Dear Evelyn Vargas,    Your test results are attached. I am happy to let you know that they are stable.    Your thyroid test looks good. The kidney and liver tests were normal. There may be a urinary tract infection and I will call if this shows up on the culture results. We can recheck labs in 6 months.     Please call me if you have any questions about these test results or about your care.    Sincerely,    Tere Camacho MD

## 2021-11-14 RX ORDER — AMOXICILLIN 500 MG/1
500 CAPSULE ORAL 3 TIMES DAILY
Qty: 21 CAPSULE | Refills: 0 | Status: SHIPPED | OUTPATIENT
Start: 2021-11-14

## 2021-11-14 NOTE — RESULT ENCOUNTER NOTE
Please call patient with results (If unable to reach patient, this may be sent as a letter instead):    Dear Evelyn Vargas,     The urine shows a urinary tract infection with Group B strep. I will send a prescription for Amoxicillin 500 mg three times a day for 7 days to your pharmacy. If this tends to give you a yeast infection or if you get symptoms, let me know and I can also send in a prescription for yeast treatment.       Tere Camacho MD

## 2021-11-28 DIAGNOSIS — G89.4 CHRONIC PAIN SYNDROME: ICD-10-CM

## 2021-11-29 RX ORDER — TRAMADOL HYDROCHLORIDE 50 MG/1
TABLET ORAL
Qty: 90 TABLET | Refills: 1 | Status: SHIPPED | OUTPATIENT
Start: 2021-11-29

## 2021-12-02 NOTE — TELEPHONE ENCOUNTER
"Requested Prescriptions   Pending Prescriptions Disp Refills     amitriptyline (ELAVIL) 50 MG tablet [Pharmacy Med Name: AMITRIPTYLINE HCL 50MG TABS] 90 tablet 3          Last Written Prescription Date:  4/5/18  Last Fill Quantity: 90,  # refills: 3   Last Office Visit with G, P or [facility] prescribing provider:  10/4/18   Future Office Visit:      Sig: TAKE ONE TABLET BY MOUTH AT BEDTIME    Tricyclic Agents ( Annual appt and no PHQ9) Passed - 2/5/2019 11:58 AM       Passed - Blood Pressure under 140/90 in past 12 mos    BP Readings from Last 3 Encounters:   10/04/18 130/88   04/05/18 (!) 132/101   02/06/17 132/88                Passed - Recent (12 mo) or future (30 days) visit within authorizing provider's specialty    Patient had office visit in the last 12 months or has a visit in the next 30 days with authorizing provider or within the authorizing provider's specialty.  See \"Patient Info\" tab in inbasket, or \"Choose Columns\" in Meds & Orders section of the refill encounter.             Passed - Medication is active on med list       Passed - Patient is age 18 or older       Passed - Patient is not pregnant       Passed - No positive pregnancy test on record in past 12 mos              Antoine Faarax  [facility]  " air/positive return of gastric contents

## 2022-01-05 DIAGNOSIS — M62.830 MUSCLE SPASM OF BACK: ICD-10-CM

## 2022-03-29 DIAGNOSIS — G62.9 NEUROPATHY: ICD-10-CM

## 2022-03-29 DIAGNOSIS — F33.41 RECURRENT MAJOR DEPRESSIVE DISORDER, IN PARTIAL REMISSION (H): ICD-10-CM

## 2022-03-29 DIAGNOSIS — G47.00 PERSISTENT INSOMNIA: ICD-10-CM

## 2022-03-29 NOTE — LETTER
Evelyn Vargas  [address]  [place] [address]          03/31/22      Dear Evelyn Vargas        At [facility] we care about your health and are committed to providing quality patient care. Regular appointments are a vital part of the care and management of your health and can help prevent many of the complications that can occur.      We have refilled your medication(s)  We will need you to schedule an office visit before any additional refills can be given.  Please call [phone] to schedule this appointment.      Thank you,    [facility]

## 2022-03-31 RX ORDER — AMITRIPTYLINE HYDROCHLORIDE 100 MG/1
TABLET ORAL
Qty: 90 TABLET | Refills: 0 | Status: SHIPPED | OUTPATIENT
Start: 2022-03-31

## 2022-03-31 RX ORDER — GABAPENTIN 300 MG/1
300 CAPSULE ORAL 2 TIMES DAILY
Qty: 180 CAPSULE | Refills: 0 | Status: SHIPPED | OUTPATIENT
Start: 2022-03-31

## 2022-03-31 RX ORDER — AMITRIPTYLINE HYDROCHLORIDE 50 MG/1
TABLET ORAL
Qty: 90 TABLET | Refills: 0 | Status: SHIPPED | OUTPATIENT
Start: 2022-03-31

## 2022-07-18 DIAGNOSIS — F33.41 RECURRENT MAJOR DEPRESSIVE DISORDER, IN PARTIAL REMISSION (H): ICD-10-CM

## 2022-07-18 DIAGNOSIS — G62.9 NEUROPATHY: ICD-10-CM

## 2022-07-18 DIAGNOSIS — G47.00 PERSISTENT INSOMNIA: ICD-10-CM

## 2022-07-20 RX ORDER — GABAPENTIN 300 MG/1
CAPSULE ORAL
Qty: 180 CAPSULE | Refills: 0 | OUTPATIENT
Start: 2022-07-20

## 2022-07-20 RX ORDER — AMITRIPTYLINE HYDROCHLORIDE 100 MG/1
TABLET ORAL
Qty: 90 TABLET | Refills: 0 | OUTPATIENT
Start: 2022-07-20

## 2022-07-20 RX ORDER — AMITRIPTYLINE HYDROCHLORIDE 50 MG/1
TABLET ORAL
Qty: 90 TABLET | Refills: 0 | OUTPATIENT
Start: 2022-07-20

## 2024-05-12 NOTE — TELEPHONE ENCOUNTER
Hospitalist Progress Note      Patient:  Ashwin Liriano    Unit/Bed:8B-34/034-A  YOB: 1959  MRN: [id number]   Acct: [id number]   PCP: Theresa Muñoz APRN - CNP  Date of Admission: 5/10/2024    Assessment/Plan:    Elevated troponin. Concerns for NSTEMI. Intermittent chest pain associated with dyspnea and exertion over the last week. High-sensitivity troponin significantly elevated at 151, 142. Heparin initiated in the emergency department. No EKG changes. Cardiology consulted.  Echo showing EF of 35 to 40% drop from prior echo of 55 to 60%. Patient is unsure if he wants to proceed with C. Consideration for stress if patient prefers.   Acute on chronic combined heart failure. Echo on 11/3/2022 with EF of 55 to 60% and grade 2 diastolic dysfunction.  Repeat echo this admission with newly reduced ef of 35-40%. The patient is also noted to have mild aortic stenosis and trace aortic regurgitation. Diuresing. Nephrology and cardiology on board.  Continue Bumex 1 mg twice a day per their recommendation. Strict ins and outs. Daily weights. Low sodium diet. Fluid restriction.  Acute hypoxic respiratory failure, likely secondary to #2. Max oxygen demand was 2L/NC. Currently at 1L/NC. Continue to wean as able. Add incentive spirometer.   Coronary artery disease. Status post CABG. Continue antiplatelets.  Mild GISELA on CKD stage IIIa. Creatinine 2.1 on arrival, baseline appears to be around 1.5-1.7. Holding Lisinopril. Nephrology following and managing volume status.   Aortic stenosis. Mild per echocardiogram on 11/9/2023.  Bilateral carotid artery stenosis. Known right carotid artery occlusion.  History of left carotid endarterectomy.  Insulin-dependent type 2 diabetes mellitus. Home regimen includes Levemir 35 units twice daily, Humalog 5 units 3 times daily with meals plus sliding scale, Trulicity, and Jardiance.Continue  Requested Prescriptions   Pending Prescriptions Disp Refills     tiZANidine (ZANAFLEX) 4 MG tablet [Pharmacy Med Name: TIZANIDINE HCL 4MG TABS] 270 tablet 1     Sig: TAKE 1 TABLET BY MOUTH 3 TIMES DAILY AS NEEDED FOR MUSCLE SPASMS.       There is no refill protocol information for this order        Routing refill request to provider for review/approval because:  Drug not on the [facility] refill protocol         Fredy Jean-Baptiste RN, BSN, PHN            No growth-preliminary Culture also yielded light growth of Enterococcus species. At least one of drugs in current antibiotic therapy is ineffective in vitro for isolates.    LABAERO very light growth 01/10/2023 08:49 PM    LABAERO very light growth 01/10/2023 08:49 PM     Lab Results   Component Value Date/Time    LABANAE  01/10/2023 08:49 PM     No growth-preliminary Culture overgrown with swarming Proteus species. No further evaluation is possible. If a true mixed aerobic and anaerobic infection is suspected, then broad spectrum empiric antibiotic therapy is indicated and should include coverage for anaerobic organisms.          Urinalysis:      Lab Results   Component Value Date/Time    NITRU NEGATIVE 01/02/2024 02:15 PM    WBCUA 0-2 01/02/2024 02:15 PM    WBCUA 0-5 07/06/2023 08:50 AM    BACTERIA NONE SEEN 01/02/2024 02:15 PM    RBCUA 3-5 01/02/2024 02:15 PM    BLOODU NEGATIVE 01/02/2024 02:15 PM    GLUCOSEU >= 1000 01/02/2024 02:15 PM    GLUCOSEU >1000 mg/dL 07/06/2023 08:50 AM       Radiology:  XR CHEST PORTABLE   Final Result   1. Mild cardiomegaly status post sternotomy and pacemaker placement.   2. Diffuse COPD with no acute infiltrates or effusions.         **This report has been created using voice recognition software. It may contain minor errors which are inherent in voice recognition technology.**      Final report electronically signed by DR KITTY BANSAL on 5/10/2024 8:36 AM        Echo (TTE) complete (PRN contrast/bubble/strain/3D)    Result Date: 5/10/2024    Left Ventricle: Moderately reduced left ventricular systolic function with a visually estimated EF of 35 - 40%. Left ventricle size is normal. Mildly increased wall thickness. Findings consistent with concentric remodeling. Moderate global hypokinesis present. Severe hypokinesis of the apex. Abnormal diastolic function.   Aortic Valve: Trileaflet valve. Moderately thickened cusp. Mildly calcified cusp. Moderate stenosis of the aortic valve. AV

## 2024-08-05 ENCOUNTER — TRANSFERRED RECORDS (OUTPATIENT)
Dept: HEALTH INFORMATION MANAGEMENT | Facility: CLINIC | Age: 74
End: 2024-08-05

## 2024-08-05 ENCOUNTER — TRANSCRIBE ORDERS (OUTPATIENT)
Dept: OTHER | Age: 74
End: 2024-08-05

## 2024-08-05 DIAGNOSIS — H47.099 OPTIC NERVE DISORDER: Primary | ICD-10-CM
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
## 2025-06-05 ENCOUNTER — OFFICE VISIT (OUTPATIENT)
Dept: OPHTHALMOLOGY | Facility: CLINIC | Age: 75
End: 2025-06-05
Attending: OPHTHALMOLOGY
Payer: COMMERCIAL

## 2025-06-05 DIAGNOSIS — G60.3 IDIOPATHIC PROGRESSIVE NEUROPATHY: ICD-10-CM

## 2025-06-05 DIAGNOSIS — H53.40 VISUAL FIELD DEFECT: Primary | ICD-10-CM

## 2025-06-05 DIAGNOSIS — H47.099 OPTIC NERVE DISORDER: ICD-10-CM

## 2025-06-05 DIAGNOSIS — R27.8 OTHER LACK OF COORDINATION: ICD-10-CM

## 2025-06-05 DIAGNOSIS — R63.4 ABNORMAL WEIGHT LOSS: ICD-10-CM

## 2025-06-05 DIAGNOSIS — H47.20 OPTIC ATROPHY: ICD-10-CM

## 2025-06-05 DIAGNOSIS — N18.30 STAGE 3 CHRONIC KIDNEY DISEASE, UNSPECIFIED WHETHER STAGE 3A OR 3B CKD (H): ICD-10-CM

## 2025-06-05 DIAGNOSIS — N18.31 STAGE 3A CHRONIC KIDNEY DISEASE (H): ICD-10-CM

## 2025-06-05 PROCEDURE — 92083 EXTENDED VISUAL FIELD XM: CPT | Performed by: OPHTHALMOLOGY

## 2025-06-05 PROCEDURE — 92133 CPTRZD OPH DX IMG PST SGM ON: CPT | Performed by: OPHTHALMOLOGY

## 2025-06-05 ASSESSMENT — SLIT LAMP EXAM - LIDS
COMMENTS: NORMAL
COMMENTS: NORMAL

## 2025-06-05 ASSESSMENT — TONOMETRY
OS_IOP_MMHG: 19
OD_IOP_MMHG: 19
IOP_METHOD: ICARE

## 2025-06-05 ASSESSMENT — CUP TO DISC RATIO
OD_RATIO: 0.9
OS_RATIO: 0.75

## 2025-06-05 ASSESSMENT — REFRACTION_WEARINGRX
OD_SPHERE: +2.50
OS_ADD: +3.00
OS_AXIS: 092
OS_SPHERE: +2.00
OD_ADD: +3.00
OD_CYLINDER: SPHERE
OS_CYLINDER: +0.50

## 2025-06-05 ASSESSMENT — CONF VISUAL FIELD
OD_INFERIOR_NASAL_RESTRICTION: 1
OD_SUPERIOR_NASAL_RESTRICTION: 1
OS_INFERIOR_NASAL_RESTRICTION: 1
OD_INFERIOR_TEMPORAL_RESTRICTION: 1
OD_SUPERIOR_TEMPORAL_RESTRICTION: 1
OS_SUPERIOR_NASAL_RESTRICTION: 1
OS_SUPERIOR_TEMPORAL_RESTRICTION: 3
OS_INFERIOR_TEMPORAL_RESTRICTION: 3

## 2025-06-05 ASSESSMENT — VISUAL ACUITY
OD_CC: NLP
OS_CC: 20/60
METHOD: SNELLEN - LINEAR
CORRECTION_TYPE: GLASSES

## 2025-06-05 ASSESSMENT — EXTERNAL EXAM - RIGHT EYE: OD_EXAM: NORMAL

## 2025-06-05 ASSESSMENT — EXTERNAL EXAM - LEFT EYE: OS_EXAM: NORMAL

## 2025-06-05 NOTE — LETTER
2025     RE:  :  MRN: Evelyn Vargas  1950  [id number]     Dear Dr. Farrar:     Thank you for asking me to see your very pleasant patient,Evelyn Vargas, in neuro-ophthalmic consultation.  I would like to thank you for sending your records and I have summarized them in the history of present illness.   My assessment and plan are below.  For further details, please see my attached clinic note.      Evelyn Vargas is a 75 year old female with the following diagnoses:     Optic atrophy both eyes    Patient was sent for consultation by Dr. Renny Farrar OD for optic atrophy LEFT eye    HPI:    In , she went blind in the RIGHT eye over 3 days.  She was told that it was from Diabetes mellitus. She was able to see shadows.  She had Cataract extraction in  or so and lost all vision postop. The vision loss was the day after.  She has been noted to have optic atrophy LEFT eye for several years. She has noted reduced vision in the LEFT eye x about 5-6 years. This has been slowly getting worse. An MRI was allegedly normal.  She has had gastric bypass x 2. She was 300 lbs initially and then lost 200 lbs or so.  She is now 140 lbs.  She only eats small amounts but says she eats healthy.  She has neuropathy in both her legs. She has a spinal cord stimulator.         Independent historians:  Patient    Review of outside testin/12/15 MRI   CONCLUSION:   1. No definite acute infarct, acute intracranial hemorrhage or increased   mass effect.     2.  Multiple small areas of T2/FLAIR signal hyperintensity in the cerebral   white matter, nonspecific, but likely representing sequela of mild to   moderate chronic small vessel ischemic change in a patient of this stated   age.     My interpretation performed today of outside testing:  Not available     Review of outside clinical notes:  Visit with Dr. Farrar:           Past medical history:  Patient Active Problem List   Diagnosis     Chronic pain syndrome    Mild persistent asthma    Vitamin B12 deficiency disease    Hypothyroidism    Obesity    Hypertension, goal below 140/90    Generalized anxiety disorder    Hypertriglyceridemia    Opiate or related narcotic overdose, accidental or unintentional, subsequent encounter    Persistent insomnia    Vision loss of right eye    Chronic fatigue fibromyalgia syndrome    Type 2 diabetes mellitus with stage 3 chronic kidney disease, without long-term current use of insulin (H)    CKD (chronic kidney disease) stage 3, GFR 30-59 ml/min (H)    Systolic ejection murmur    Hx of adjustment reaction due to domestic issues    Vitamin D deficiency    Heart murmur    Falls frequently       Medications:   Current Outpatient Medications   Medication Sig Dispense Refill    albuterol (PROAIR HFA/PROVENTIL HFA/VENTOLIN HFA) 108 (90 Base) MCG/ACT inhaler Inhale 2 puffs into the lungs every 6 hours 18 g 4    albuterol (PROVENTIL) (2.5 MG/3ML) 0.083% neb solution Take 1 vial (2.5 mg) by nebulization every 6 hours as needed 360 mL 11    amitriptyline (ELAVIL) 100 MG tablet TAKE ONE TABLET BY MOUTH AT BEDTIME 90 tablet 0    amitriptyline (ELAVIL) 50 MG tablet TAKE ONE TABLET BY MOUTH AT BEDTIME 90 tablet 0    amLODIPine (NORVASC) 5 MG tablet Take 1 tablet (5 mg) by mouth daily 90 tablet 3    losartan (COZAAR) 100 MG tablet TAKE ONE TABLET BY MOUTH EVERY DAY 90 tablet 3    tiZANidine (ZANAFLEX) 4 MG tablet TAKE ONE TABLET BY MOUTH THREE TIMES A DAY AS NEEDED FOR MUSCLE SPASMS 270 tablet 1    traMADol (ULTRAM) 50 MG tablet TAKE ONE TABLET BY MOUTH EVERY 8 HOURS AS NEEDED FOR SEVERE PAIN 90 tablet 1    amoxicillin (AMOXIL) 500 MG capsule Take 1 capsule (500 mg) by mouth 3 times daily (Patient not taking: Reported on 6/5/2025) 21 capsule 0    cloNIDine (CATAPRES) 0.2 MG tablet TAKE ONE TABLET BY MOUTH THREE TIMES A DAY (Patient not taking: Reported on 6/5/2025) 270 tablet 3    gabapentin (NEURONTIN) 300 MG capsule Take 1  capsule (300 mg) by mouth 2 times daily (Patient not taking: Reported on 6/5/2025) 180 capsule 0    naloxone (NARCAN) 4 MG/0.1ML nasal spray Spray 1 spray (4 mg) into one nostril alternating nostrils once as needed for opioid reversal every 2-3 minutes until assistance arrives (Patient not taking: Reported on 6/5/2025) 0.2 mL 0    order for DME Equipment being ordered: 6 pairs of Core sport knee highs large, for diabetes. 6 Device 0    polyethylene glycol (MIRALAX) 17 GM/Dose powder Take 17 g (1 capful) by mouth daily (Patient not taking: Reported on 6/5/2025) 507 g 3    SENNA-docusate sodium (SENNA S) 8.6-50 MG tablet Take 1 tablet by mouth At Bedtime (Patient not taking: Reported on 6/5/2025) 60 tablet 3    STATIN NOT PRESCRIBED (INTENTIONAL) 1 each daily Please choose reason not prescribed, below  1    vitamin D3 (CHOLECALCIFEROL) 2000 units (50 mcg) tablet Take 1 tablet (2,000 Units) by mouth daily (Patient not taking: Reported on 6/5/2025) 100 tablet 3       Family history:  Patient denies family history vision loss.      Social history:  Patient  reports that she has never smoked. She has never used smokeless tobacco. She reports that she does not drink alcohol and does not use drugs.    Occupation: retired RN     Exam:  Visual acuity NLP right eye 20/60 left eye.  Color vision 11/11 right eye and 0.5/11 left eye.  Pupils APD RIGHT eye. Intraocular pressure 19 right eye and 19 left eye.  Anterior segment exam showed corneal scarring and posterior chamber intraocular lens (PCIOL) both eyes.  Fundus exam showed optic atrophy both eyes and a possible shunt vessel RIGHT eye.       Tests ordered and interpreted today:  HVF 24-2 KATHY FAST OS          Performed by: ak/ss   . Patient cooperation: Reliable   .   Try and fail Kathy-Fast. Switch to LVC. Good fix. Dilate after VF ou. . Reliability of the test: Good   . Findings: Circumferential constriction   . Interpretation: Abnormal   . Interval: Initial   .           OCT Optic Nerve RNFL Spectralis OU (both eyes)          Performed by: wt   .     Right Eye  Reliability of the test: Good   . Test Findings: Abnormal   . Interpretation: Diffuse loss   . Plan: Monitor   . Interval: Initial   .     Left Eye  Reliability of the test: Good   . Interpretation: Diffuse loss   . Plan: Monitor   . Interval: Initial   .              Discussion of management / interpretation with another provider:   None    Assessment/Plan:   It is my impression that patient has optic atrophy both eyes. She has a history of vision loss RIGHT eye for an unknown reason.  This may have been a Central retinal vein occlusion given the shunt vessel in the RIGHT eye.      She has new, progressive optic atrophy and vision loss LEFT eye.  I would like to order an MRI (she states that her spinal cord stimulator is MRI compliant).  I will also order a lab work-up for optic atrophy.   If this is normal, then  could consider genetic testing.          Again, thank you for allowing me to participate in the care of your patient.      Sincerely,    Marques Tucker MD  Professor  Director of Neuro-Ophthalmology  [facility] [facility] [employer] Endowed Chair  Departments of Ophthalmology, Neurology, and Neurosurgery  [facility] [id number] [place]  [address]  T - [phone]  F - [phone]  PATRICIA [email or web address]      CC: Renny Farrar, OD  [facility]  [address] [place] [address]  Via Fax: [phone]    DX = optic atrophy